# Patient Record
Sex: MALE | Race: OTHER | Employment: UNEMPLOYED | URBAN - METROPOLITAN AREA
[De-identification: names, ages, dates, MRNs, and addresses within clinical notes are randomized per-mention and may not be internally consistent; named-entity substitution may affect disease eponyms.]

---

## 2019-03-21 ENCOUNTER — APPOINTMENT (OUTPATIENT)
Dept: CT IMAGING | Age: 49
End: 2019-03-21

## 2019-03-21 ENCOUNTER — HOSPITAL ENCOUNTER (EMERGENCY)
Age: 49
Discharge: HOME | End: 2019-03-21
Attending: EMERGENCY MEDICINE

## 2019-03-21 VITALS
HEIGHT: 74 IN | BODY MASS INDEX: 24.38 KG/M2 | HEART RATE: 83 BPM | OXYGEN SATURATION: 97 % | SYSTOLIC BLOOD PRESSURE: 130 MMHG | WEIGHT: 190 LBS | TEMPERATURE: 97 F | DIASTOLIC BLOOD PRESSURE: 83 MMHG | RESPIRATION RATE: 18 BRPM

## 2019-03-21 DIAGNOSIS — F19.920 DRUG INTOXICATION WITHOUT COMPLICATION (HCC): ICD-10-CM
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
## 2019-03-21 PROCEDURE — 70450 CT HEAD/BRAIN W/O DYE: CPT

## 2019-03-21 PROCEDURE — G0384 LEV 5 HOSP TYPE B ED VISIT: HCPCS

## 2019-03-21 ASSESSMENT — ENCOUNTER SYMPTOMS
DIARRHEA: 0
SHORTNESS OF BREATH: 0
COLOR CHANGE: 0
COUGH: 0
BACK PAIN: 0
ABDOMINAL PAIN: 0
NAUSEA: 0
VOMITING: 0
SORE THROAT: 0

## 2021-10-07 ENCOUNTER — HOSPITAL ENCOUNTER (OUTPATIENT)
Age: 51
Discharge: HOME | End: 2021-10-07
Payer: COMMERCIAL

## 2021-10-07 LAB
ABSOLUTE EOS #: 0.14 K/UL (ref 0–0.44)
ABSOLUTE IMMATURE GRANULOCYTE: <0.03 K/UL (ref 0–0.3)
ABSOLUTE LYMPH #: 2.54 K/UL (ref 1.1–3.7)
ABSOLUTE MONO #: 0.58 K/UL (ref 0.1–1.2)
ALBUMIN SERPL-MCNC: 4.3 G/DL (ref 3.5–5.2)
ALBUMIN/GLOBULIN RATIO: 1.4 (ref 1–2.5)
ALP BLD-CCNC: 64 U/L (ref 40–129)
ALT SERPL-CCNC: 59 U/L (ref 5–41)
ANION GAP SERPL CALCULATED.3IONS-SCNC: 10 MMOL/L (ref 9–17)
AST SERPL-CCNC: 48 U/L
BASOPHILS # BLD: 0 % (ref 0–2)
BASOPHILS ABSOLUTE: <0.03 K/UL (ref 0–0.2)
BILIRUB SERPL-MCNC: 0.79 MG/DL (ref 0.3–1.2)
BUN BLDV-MCNC: 17 MG/DL (ref 6–20)
BUN/CREAT BLD: ABNORMAL (ref 9–20)
CALCIUM SERPL-MCNC: 9.3 MG/DL (ref 8.6–10.4)
CHLORIDE BLD-SCNC: 103 MMOL/L (ref 98–107)
CO2: 25 MMOL/L (ref 20–31)
CREAT SERPL-MCNC: 0.93 MG/DL (ref 0.7–1.2)
DIFFERENTIAL TYPE: ABNORMAL
EKG ATRIAL RATE: 78 BPM
EKG P AXIS: 27 DEGREES
EKG P-R INTERVAL: 140 MS
EKG Q-T INTERVAL: 378 MS
EKG QRS DURATION: 108 MS
EKG QTC CALCULATION (BAZETT): 430 MS
EKG R AXIS: 23 DEGREES
EKG T AXIS: 16 DEGREES
EKG VENTRICULAR RATE: 78 BPM
EOSINOPHILS RELATIVE PERCENT: 3 % (ref 1–4)
GFR AFRICAN AMERICAN: >60 ML/MIN
GFR NON-AFRICAN AMERICAN: >60 ML/MIN
GFR SERPL CREATININE-BSD FRML MDRD: ABNORMAL ML/MIN/{1.73_M2}
GFR SERPL CREATININE-BSD FRML MDRD: ABNORMAL ML/MIN/{1.73_M2}
GLUCOSE BLD-MCNC: 104 MG/DL (ref 70–99)
HAV IGM SER IA-ACNC: NONREACTIVE
HCT VFR BLD CALC: 48 % (ref 40.7–50.3)
HEMOGLOBIN: 15.7 G/DL (ref 13–17)
HEPATITIS B CORE IGM ANTIBODY: NONREACTIVE
HEPATITIS B SURFACE ANTIGEN: NONREACTIVE
HEPATITIS C ANTIBODY: REACTIVE
HIV AG/AB: NONREACTIVE
IMMATURE GRANULOCYTES: 0 %
LYMPHOCYTES # BLD: 47 % (ref 24–43)
MCH RBC QN AUTO: 32.2 PG (ref 25.2–33.5)
MCHC RBC AUTO-ENTMCNC: 32.7 G/DL (ref 28.4–34.8)
MCV RBC AUTO: 98.4 FL (ref 82.6–102.9)
MONOCYTES # BLD: 11 % (ref 3–12)
NRBC AUTOMATED: 0 PER 100 WBC
PDW BLD-RTO: 13 % (ref 11.8–14.4)
PLATELET # BLD: 258 K/UL (ref 138–453)
PLATELET ESTIMATE: ABNORMAL
PMV BLD AUTO: 10 FL (ref 8.1–13.5)
POTASSIUM SERPL-SCNC: 4 MMOL/L (ref 3.7–5.3)
RBC # BLD: 4.88 M/UL (ref 4.21–5.77)
RBC # BLD: ABNORMAL 10*6/UL
SEG NEUTROPHILS: 39 % (ref 36–65)
SEGMENTED NEUTROPHILS ABSOLUTE COUNT: 2.14 K/UL (ref 1.5–8.1)
SODIUM BLD-SCNC: 138 MMOL/L (ref 135–144)
TOTAL PROTEIN: 7.4 G/DL (ref 6.4–8.3)
WBC # BLD: 5.4 K/UL (ref 3.5–11.3)
WBC # BLD: ABNORMAL 10*3/UL

## 2021-10-07 PROCEDURE — 93010 ELECTROCARDIOGRAM REPORT: CPT | Performed by: INTERNAL MEDICINE

## 2021-10-07 PROCEDURE — 93005 ELECTROCARDIOGRAM TRACING: CPT | Performed by: FAMILY MEDICINE

## 2021-10-07 PROCEDURE — 80074 ACUTE HEPATITIS PANEL: CPT

## 2021-10-07 PROCEDURE — 36415 COLL VENOUS BLD VENIPUNCTURE: CPT

## 2021-10-07 PROCEDURE — 80053 COMPREHEN METABOLIC PANEL: CPT

## 2021-10-07 PROCEDURE — 87389 HIV-1 AG W/HIV-1&-2 AB AG IA: CPT

## 2021-10-07 PROCEDURE — 86480 TB TEST CELL IMMUN MEASURE: CPT

## 2021-10-07 PROCEDURE — 85025 COMPLETE CBC W/AUTO DIFF WBC: CPT

## 2021-10-09 LAB
QUANTI TB GOLD PLUS: NEGATIVE
QUANTI TB1 MINUS NIL: 0.01 IU/ML (ref 0–0.34)
QUANTI TB2 MINUS NIL: 0.01 IU/ML (ref 0–0.34)
QUANTIFERON MITOGEN: >10 IU/ML
QUANTIFERON NIL: 0.01 IU/ML

## 2023-03-07 ENCOUNTER — TELEPHONE (OUTPATIENT)
Dept: FAMILY MEDICINE CLINIC | Age: 53
End: 2023-03-07

## 2023-03-07 NOTE — TELEPHONE ENCOUNTER
----- Message from Larry Arechiga sent at 3/7/2023  3:48 PM EST -----  Subject: Message to Provider    QUESTIONS  Information for Provider? pt would like to know if he can be seen by PCP   Roma Perales. Please advise if he can establish care again.   ---------------------------------------------------------------------------  --------------  [address]  [phone]; OK to leave message on voicemail  ---------------------------------------------------------------------------  --------------  SCRIPT ANSWERS  Relationship to Patient?  Self

## 2023-03-30 ENCOUNTER — OFFICE VISIT (OUTPATIENT)
Dept: INTERNAL MEDICINE CLINIC | Age: 53
End: 2023-03-30

## 2023-03-30 VITALS
DIASTOLIC BLOOD PRESSURE: 86 MMHG | WEIGHT: 215 LBS | HEART RATE: 80 BPM | SYSTOLIC BLOOD PRESSURE: 130 MMHG | BODY MASS INDEX: 27.59 KG/M2 | HEIGHT: 74 IN | OXYGEN SATURATION: 95 %

## 2023-03-30 DIAGNOSIS — Z13.220 SCREENING FOR HYPERLIPIDEMIA: ICD-10-CM

## 2023-03-30 DIAGNOSIS — Z00.00 WELL ADULT EXAM: ICD-10-CM

## 2023-03-30 DIAGNOSIS — B19.20 HEPATITIS C VIRUS INFECTION WITHOUT HEPATIC COMA, UNSPECIFIED CHRONICITY: ICD-10-CM

## 2023-03-30 DIAGNOSIS — F10.20 ALCOHOLISM (HCC): Primary | ICD-10-CM

## 2023-03-30 DIAGNOSIS — Z12.11 COLON CANCER SCREENING: ICD-10-CM

## 2023-03-30 SDOH — ECONOMIC STABILITY: FOOD INSECURITY: WITHIN THE PAST 12 MONTHS, THE FOOD YOU BOUGHT JUST DIDN'T LAST AND YOU DIDN'T HAVE MONEY TO GET MORE.: NEVER TRUE

## 2023-03-30 SDOH — ECONOMIC STABILITY: HOUSING INSECURITY
IN THE LAST 12 MONTHS, WAS THERE A TIME WHEN YOU DID NOT HAVE A STEADY PLACE TO SLEEP OR SLEPT IN A SHELTER (INCLUDING NOW)?: NO

## 2023-03-30 SDOH — ECONOMIC STABILITY: FOOD INSECURITY: WITHIN THE PAST 12 MONTHS, YOU WORRIED THAT YOUR FOOD WOULD RUN OUT BEFORE YOU GOT MONEY TO BUY MORE.: NEVER TRUE

## 2023-03-30 SDOH — ECONOMIC STABILITY: INCOME INSECURITY: HOW HARD IS IT FOR YOU TO PAY FOR THE VERY BASICS LIKE FOOD, HOUSING, MEDICAL CARE, AND HEATING?: NOT HARD AT ALL

## 2023-03-30 ASSESSMENT — PATIENT HEALTH QUESTIONNAIRE - PHQ9
2. FEELING DOWN, DEPRESSED OR HOPELESS: 0
SUM OF ALL RESPONSES TO PHQ QUESTIONS 1-9: 0
SUM OF ALL RESPONSES TO PHQ QUESTIONS 1-9: 0
1. LITTLE INTEREST OR PLEASURE IN DOING THINGS: 0
SUM OF ALL RESPONSES TO PHQ QUESTIONS 1-9: 0
SUM OF ALL RESPONSES TO PHQ QUESTIONS 1-9: 0
SUM OF ALL RESPONSES TO PHQ9 QUESTIONS 1 & 2: 0

## 2023-03-30 ASSESSMENT — ENCOUNTER SYMPTOMS: HEARTBURN: 1

## 2023-03-30 NOTE — PROGRESS NOTES
Visit Information    Have you changed or started any medications since your last visit including any over-the-counter medicines, vitamins, or herbal medicines? no   Are you having any side effects from any of your medications? -  no  Have you stopped taking any of your medications? Is so, why? -  no    Have you seen any other physician or provider since your last visit? Yes - Records Obtained  Have you had any other diagnostic tests since your last visit? Yes - Records Obtained  Have you been seen in the emergency room and/or had an admission to a hospital since we last saw you? Yes - Records Obtained  Have you had your routine dental cleaning in the past 6 months? no    Have you activated your [facility] account? If not, what are your barriers?  No:      Patient Care Team:  Monique Garrison MD as PCP - General (Internal Medicine)  Yana Higgins MD    Medical History Review  Past Medical, Family, and Social History reviewed and does contribute to the patient presenting condition    Health Maintenance   Topic Date Due    COVID-19 Vaccine (1) Never done    Pneumococcal 0-64 years Vaccine (1 - PCV) Never done    Depression Screen  Never done    DTaP/Tdap/Td vaccine (1 - Tdap) Never done    Lipids  Never done    Colorectal Cancer Screen  Never done    Shingles vaccine (1 of 2) Never done    Flu vaccine (1) Never done    Hepatitis C screen  Completed    HIV screen  Completed    Hepatitis A vaccine  Aged Out    Hib vaccine  Aged Out    Meningococcal (ACWY) vaccine  Aged Out
normal.      Pupils: Pupils are equal, round, and reactive to light. Neck:      Thyroid: No thyromegaly. Vascular: No JVD. Cardiovascular:      Rate and Rhythm: Normal rate and regular rhythm. Heart sounds: Normal heart sounds. No murmur heard. Pulmonary:      Effort: Pulmonary effort is normal.      Breath sounds: Normal breath sounds. Abdominal:      General: Bowel sounds are normal.      Palpations: Abdomen is soft. Musculoskeletal:         General: Normal range of motion. Cervical back: Normal range of motion and neck supple. Skin:     General: Skin is warm and dry. Neurological:      Mental Status: He is alert and oriented to person, place, and time. Deep Tendon Reflexes: Reflexes are normal and symmetric. /86 (Site: Right Upper Arm, Position: Sitting)   Pulse 80   Ht 6' 2\" (1.88 m)   Wt 215 lb (97.5 kg)   SpO2 95%   BMI 27.60 kg/m²       Assessment:       Diagnosis Orders   1. Alcoholism ([address]        2. Screening for hyperlipidemia        3. Hepatitis C virus infection without hepatic coma, unspecified chronicity        4. Colon cancer screening  FIT-DNA (Cologuard)      5. Well adult exam            Plan:      Return in about 3 months (around 6/30/2023). No orders of the defined types were placed in this encounter. Orders Placed This Encounter   Procedures    FIT-DNA (Cologuard)              Patient given educational materials - see patient instructions. Discussed use, benefit, and side effects of prescribed medications. All patient questions answered. Pt voiced understanding.     Electronically signed by Ceci Garcia MD on 3/30/2023at 3:28 PM

## 2023-04-24 ENCOUNTER — TELEPHONE (OUTPATIENT)
Dept: INTERNAL MEDICINE CLINIC | Age: 53
End: 2023-04-24

## 2023-04-24 RX ORDER — OMEPRAZOLE 40 MG/1
40 CAPSULE, DELAYED RELEASE ORAL DAILY
Qty: 30 CAPSULE | Refills: 2 | Status: SHIPPED | OUTPATIENT
Start: 2023-04-24

## 2023-04-24 NOTE — TELEPHONE ENCOUNTER
----- Message from Uziel Wahkiakum sent at 4/24/2023  9:53 AM EDT -----  Subject: Refill Request    QUESTIONS  Name of Medication? omeprazole (PRILOSEC) 40 MG delayed release capsule  Patient-reported dosage and instructions? 40 mg, once a day  How many days do you have left? 0  [id number]  Pharmacy phone number (if available)? [phone]  Additional Information for Provider? Please refill ASAP as the patient is   out of this medication.  ---------------------------------------------------------------------------  --------------  CALL BACK INFO  What is the best way for the office to contact you? OK to leave message on   voicemail  Preferred Call Back Phone Number? [phone]  ---------------------------------------------------------------------------  --------------  SCRIPT ANSWERS  Relationship to Patient?  Self

## 2023-04-24 NOTE — TELEPHONE ENCOUNTER
----- Message from Ashley Julien sent at 4/24/2023  9:55 AM EDT -----  Subject: Message to Provider    QUESTIONS  Information for Provider? Patient would like a note written for work   stating that he is medically able to use the gym at work. Please call   patient to discuss picking up this note from the office asap. Thank you.  ---------------------------------------------------------------------------  --------------  Abimbola Odonnell [facility]  [phone]; OK to leave message on voicemail  ---------------------------------------------------------------------------  --------------  SCRIPT ANSWERS  Relationship to Patient?  Self

## 2023-04-25 NOTE — TELEPHONE ENCOUNTER
Patient returned call, please call back before 12:00 pm stating that he will not be available after that time.

## 2023-07-10 ENCOUNTER — HOSPITAL ENCOUNTER (EMERGENCY)
Age: 53
Discharge: HOME | End: 2023-07-11
Attending: STUDENT IN AN ORGANIZED HEALTH CARE EDUCATION/TRAINING PROGRAM
Payer: COMMERCIAL

## 2023-07-10 VITALS
WEIGHT: 195 LBS | OXYGEN SATURATION: 98 % | SYSTOLIC BLOOD PRESSURE: 140 MMHG | TEMPERATURE: 98 F | HEART RATE: 103 BPM | BODY MASS INDEX: 25.84 KG/M2 | RESPIRATION RATE: 15 BRPM | HEIGHT: 73 IN | DIASTOLIC BLOOD PRESSURE: 93 MMHG

## 2023-07-10 DIAGNOSIS — T50.901A ACCIDENTAL OVERDOSE, INITIAL ENCOUNTER: Primary | ICD-10-CM

## 2023-07-10 PROCEDURE — 99283 EMERGENCY DEPT VISIT LOW MDM: CPT

## 2023-07-10 PROCEDURE — 93005 ELECTROCARDIOGRAM TRACING: CPT | Performed by: EMERGENCY MEDICINE

## 2023-07-10 ASSESSMENT — PAIN - FUNCTIONAL ASSESSMENT: PAIN_FUNCTIONAL_ASSESSMENT: NONE - DENIES PAIN

## 2023-07-11 LAB
EKG ATRIAL RATE: 105 BPM
EKG P AXIS: 40 DEGREES
EKG P-R INTERVAL: 146 MS
EKG Q-T INTERVAL: 366 MS
EKG QRS DURATION: 112 MS
EKG QTC CALCULATION (BAZETT): 483 MS
EKG R AXIS: -45 DEGREES
EKG T AXIS: 46 DEGREES
EKG VENTRICULAR RATE: 105 BPM

## 2023-07-11 PROCEDURE — 93010 ELECTROCARDIOGRAM REPORT: CPT | Performed by: INTERNAL MEDICINE

## 2023-07-11 ASSESSMENT — ENCOUNTER SYMPTOMS
ABDOMINAL PAIN: 0
SHORTNESS OF BREATH: 0

## 2023-07-11 NOTE — ED TRIAGE NOTES
Mode of arrival (squad #, walk in, police, etc) : [place]/ EMS        Chief complaint(s): Unintentional Overdose        Arrival Note (brief scenario, treatment PTA, etc). : Pt brought to ER by ems for unintentional overdose. Pt was given 2mg IV narcan and became conscious. Pt alert and oriented X 4 now. Resp easy. PWD  Pt states he did meth and cocaine today with no intention to harm himself. C= \"Have you ever felt that you should Cut down on your drinking? \"  no  A= \"Have people Annoyed you by criticizing your drinking? \"  no  G= \"Have you ever felt bad or Guilty about your drinking? \"  no  E= \"Have you ever had a drink as an Eye-opener first thing in the morning to steady your nerves or to help a hangover? \"  no      Deferred []      Reason for deferring:     *If yes to two or more: probable alcohol abuse. *

## 2023-07-11 NOTE — DISCHARGE INSTRUCTIONS
You were seen today after an overdose. He received Narcan with improvement in your symptoms. You had no complaints while you are here and state you felt fine. We did an EKG that was normal.  He were requesting discharge after he observed you for 2 hours. If you notice any concerning symptoms please return to the ER immediately. These can include but are not limited to: fevers, chills, shortness of breath, vomiting, weakness of the extremities, changes in your mental status, numbness, pale extremities, or chest pain. Medications: Continue taking your home medications as previously directed. Follow up: Please follow up with your primary care doctor within one week or as needed.

## 2023-07-11 NOTE — ED PROVIDER NOTES
[facility], [place], operated by [facility] ED  Emergency Department Encounter  Emergency Medicine Resident     Pt Jo Marsh  MRN: [id number]  [address] 1970  Date of evaluation: 7/10/23  PCP:  Ramu Reyna MD  Note Started: 10:11 PM EDT      CHIEF COMPLAINT       Chief Complaint   Patient presents with    Drug Overdose       HISTORY OF PRESENT ILLNESS  (Location/Symptom, Timing/Onset, Context/Setting, Quality, Duration, Modifying Factors, Severity.)      Alanis Thurston is a 46 y.o. male who presents with overdose of unknown substance. Patient denied taking anything to EMS. He tells me he took 20 mg of Percocet and a couple shots of tequila. He received 2 mg of Narcan intranasally and 4 mg of Narcan IV prior to arrival.  Patient is awake and alert and has no complaints. PAST MEDICAL / SURGICAL / SOCIAL / FAMILY HISTORY      has a past medical history of GERD (gastroesophageal reflux disease) and Overdose.       has a past surgical history that includes Wrist fracture surgery. Social History     Socioeconomic History    Marital status: Single     Spouse name: Not on file    Number of children: Not on file    Years of education: Not on file    Highest education level: Not on file   Occupational History    Not on file   Tobacco Use    Smoking status: Light Smoker    Smokeless tobacco: Not on file   Vaping Use    Vaping Use: Never used   Substance and Sexual Activity    Alcohol use:  Yes     Alcohol/week: 5.0 standard drinks     Types: 5 Cans of beer per week     Comment: 2-5 beers daily    Drug use: Yes     Types: Cocaine, Methamphetamines (Crystal Meth)    Sexual activity: Not on file   Other Topics Concern    Not on file   Social History Narrative    Not on file     Social Determinants of Health     Financial Resource Strain: Low Risk     Difficulty of Paying Living Expenses: Not hard at all   Food Insecurity: No Food Insecurity    Worried About Running Out of Food in the Last Year: Never true    Ran Out of Food in

## 2024-09-08 ENCOUNTER — HOSPITAL ENCOUNTER (EMERGENCY)
Age: 54
Discharge: HOME | End: 2024-09-08
Attending: EMERGENCY MEDICINE
Payer: COMMERCIAL

## 2024-09-08 ENCOUNTER — APPOINTMENT (OUTPATIENT)
Dept: CT IMAGING | Age: 54
End: 2024-09-08
Payer: COMMERCIAL

## 2024-09-08 ENCOUNTER — APPOINTMENT (OUTPATIENT)
Dept: GENERAL RADIOLOGY | Age: 54
End: 2024-09-08
Attending: EMERGENCY MEDICINE
Payer: COMMERCIAL

## 2024-09-08 VITALS
RESPIRATION RATE: 16 BRPM | TEMPERATURE: 98.4 F | SYSTOLIC BLOOD PRESSURE: 142 MMHG | BODY MASS INDEX: 25.73 KG/M2 | DIASTOLIC BLOOD PRESSURE: 86 MMHG | WEIGHT: 195 LBS | HEART RATE: 74 BPM | OXYGEN SATURATION: 100 %

## 2024-09-08 DIAGNOSIS — S09.90XA CLOSED HEAD INJURY, INITIAL ENCOUNTER: ICD-10-CM
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
## 2024-09-08 DIAGNOSIS — S43.004A DISLOCATION OF RIGHT SHOULDER JOINT, INITIAL ENCOUNTER: Primary | ICD-10-CM

## 2024-09-08 DIAGNOSIS — S40.011A CONTUSION OF RIGHT SHOULDER, INITIAL ENCOUNTER: ICD-10-CM

## 2024-09-08 LAB
ALBUMIN SERPL-MCNC: 3.8 G/DL (ref 3.5–5.2)
ALP SERPL-CCNC: 75 U/L (ref 40–129)
ALT SERPL-CCNC: 53 U/L (ref 5–41)
ANION GAP SERPL CALCULATED.3IONS-SCNC: 10 MMOL/L (ref 9–17)
AST SERPL-CCNC: 56 U/L
BASOPHILS # BLD: 0 K/UL (ref 0–0.2)
BASOPHILS NFR BLD: 0 % (ref 0–2)
BILIRUB SERPL-MCNC: 1.2 MG/DL (ref 0.3–1.2)
BUN SERPL-MCNC: 12 MG/DL (ref 6–20)
CALCIUM SERPL-MCNC: 9.1 MG/DL (ref 8.6–10.4)
CHLORIDE SERPL-SCNC: 101 MMOL/L (ref 98–107)
CO2 SERPL-SCNC: 26 MMOL/L (ref 20–31)
CREAT SERPL-MCNC: 0.8 MG/DL (ref 0.7–1.2)
EOSINOPHIL # BLD: 0 K/UL (ref 0–0.4)
EOSINOPHILS RELATIVE PERCENT: 0 % (ref 0–4)
ERYTHROCYTE [DISTWIDTH] IN BLOOD BY AUTOMATED COUNT: 13.8 % (ref 11.5–14.9)
GFR, ESTIMATED: >90 ML/MIN/1.73M2
GLUCOSE SERPL-MCNC: 134 MG/DL (ref 70–99)
HCT VFR BLD AUTO: 45 % (ref 41–53)
HGB BLD-MCNC: 15.3 G/DL (ref 13.5–17.5)
INR PPP: 0.9
LYMPHOCYTES NFR BLD: 1.8 K/UL (ref 1–4.8)
LYMPHOCYTES RELATIVE PERCENT: 17 % (ref 24–44)
MCH RBC QN AUTO: 33.2 PG (ref 26–34)
MCHC RBC AUTO-ENTMCNC: 34 G/DL (ref 31–37)
MCV RBC AUTO: 97.6 FL (ref 80–100)
MONOCYTES NFR BLD: 1 K/UL (ref 0.1–1.3)
MONOCYTES NFR BLD: 9 % (ref 1–7)
NEUTROPHILS NFR BLD: 74 % (ref 36–66)
NEUTS SEG NFR BLD: 7.8 K/UL (ref 1.3–9.1)
PARTIAL THROMBOPLASTIN TIME: 25 SEC (ref 24–36)
PLATELET # BLD AUTO: 216 K/UL (ref 150–450)
PMV BLD AUTO: 8 FL (ref 6–12)
POTASSIUM SERPL-SCNC: 4.5 MMOL/L (ref 3.7–5.3)
PROT SERPL-MCNC: 6.9 G/DL (ref 6.4–8.3)
PROTHROMBIN TIME: 12.8 SEC (ref 11.8–14.6)
RBC # BLD AUTO: 4.6 M/UL (ref 4.5–5.9)
SODIUM SERPL-SCNC: 137 MMOL/L (ref 135–144)
WBC OTHER # BLD: 10.6 K/UL (ref 3.5–11)

## 2024-09-08 PROCEDURE — 6360000002 HC RX W HCPCS: Performed by: EMERGENCY MEDICINE

## 2024-09-08 PROCEDURE — 36415 COLL VENOUS BLD VENIPUNCTURE: CPT

## 2024-09-08 PROCEDURE — 73030 X-RAY EXAM OF SHOULDER: CPT

## 2024-09-08 PROCEDURE — 85025 COMPLETE CBC W/AUTO DIFF WBC: CPT

## 2024-09-08 PROCEDURE — 96374 THER/PROPH/DIAG INJ IV PUSH: CPT

## 2024-09-08 PROCEDURE — 23650 CLTX SHO DSLC W/MNPJ WO ANES: CPT

## 2024-09-08 PROCEDURE — 73060 X-RAY EXAM OF HUMERUS: CPT

## 2024-09-08 PROCEDURE — 80053 COMPREHEN METABOLIC PANEL: CPT

## 2024-09-08 PROCEDURE — 71046 X-RAY EXAM CHEST 2 VIEWS: CPT

## 2024-09-08 PROCEDURE — 85610 PROTHROMBIN TIME: CPT

## 2024-09-08 PROCEDURE — 99285 EMERGENCY DEPT VISIT HI MDM: CPT

## 2024-09-08 PROCEDURE — 99152 MOD SED SAME PHYS/QHP 5/>YRS: CPT

## 2024-09-08 PROCEDURE — 85730 THROMBOPLASTIN TIME PARTIAL: CPT

## 2024-09-08 PROCEDURE — 72125 CT NECK SPINE W/O DYE: CPT

## 2024-09-08 PROCEDURE — 2500000003 HC RX 250 WO HCPCS: Performed by: EMERGENCY MEDICINE

## 2024-09-08 PROCEDURE — 99153 MOD SED SAME PHYS/QHP EA: CPT

## 2024-09-08 PROCEDURE — 70450 CT HEAD/BRAIN W/O DYE: CPT

## 2024-09-08 RX ORDER — KETAMINE HYDROCHLORIDE 50 MG/ML
INJECTION, SOLUTION INTRAMUSCULAR; INTRAVENOUS
Status: DISCONTINUED
Start: 2024-09-08 | End: 2024-09-08 | Stop reason: HOSPADM

## 2024-09-08 RX ORDER — IBUPROFEN 800 MG/1
800 TABLET, FILM COATED ORAL EVERY 8 HOURS PRN
Qty: 20 TABLET | Refills: 0 | Status: SHIPPED | OUTPATIENT
Start: 2024-09-08

## 2024-09-08 RX ORDER — METHOCARBAMOL 750 MG/1
750 TABLET, FILM COATED ORAL 3 TIMES DAILY PRN
Qty: 15 TABLET | Refills: 0 | Status: SHIPPED | OUTPATIENT
Start: 2024-09-08 | End: 2024-09-18

## 2024-09-08 RX ORDER — KETAMINE HYDROCHLORIDE 50 MG/ML
INJECTION, SOLUTION INTRAMUSCULAR; INTRAVENOUS DAILY PRN
Status: COMPLETED | OUTPATIENT
Start: 2024-09-08 | End: 2024-09-08

## 2024-09-08 RX ORDER — FENTANYL CITRATE 0.05 MG/ML
25 INJECTION, SOLUTION INTRAMUSCULAR; INTRAVENOUS ONCE
Status: COMPLETED | OUTPATIENT
Start: 2024-09-08 | End: 2024-09-08

## 2024-09-08 RX ORDER — HYDROCODONE BITARTRATE AND ACETAMINOPHEN 5; 325 MG/1; MG/1
1 TABLET ORAL EVERY 8 HOURS PRN
Qty: 10 TABLET | Refills: 0 | Status: SHIPPED | OUTPATIENT
Start: 2024-09-08 | End: 2024-09-11

## 2024-09-08 RX ADMIN — PROPOFOL 40 MG: 10 INJECTION, EMULSION INTRAVENOUS at 15:31

## 2024-09-08 RX ADMIN — PROPOFOL 40 MG: 10 INJECTION, EMULSION INTRAVENOUS at 15:29

## 2024-09-08 RX ADMIN — KETAMINE HYDROCHLORIDE 100 MG: 50 INJECTION, SOLUTION INTRAMUSCULAR; INTRAVENOUS at 15:40

## 2024-09-08 RX ADMIN — PROPOFOL 30 MG: 10 INJECTION, EMULSION INTRAVENOUS at 15:34

## 2024-09-08 RX ADMIN — PROPOFOL 90 MG: 10 INJECTION, EMULSION INTRAVENOUS at 15:27

## 2024-09-08 RX ADMIN — FENTANYL CITRATE 25 MCG: 0.05 INJECTION, SOLUTION INTRAMUSCULAR; INTRAVENOUS at 13:59

## 2024-09-08 ASSESSMENT — PAIN DESCRIPTION - ORIENTATION
ORIENTATION: RIGHT

## 2024-09-08 ASSESSMENT — PAIN SCALES - GENERAL
PAINLEVEL_OUTOF10: 2
PAINLEVEL_OUTOF10: 8
PAINLEVEL_OUTOF10: 6

## 2024-09-08 ASSESSMENT — PAIN DESCRIPTION - LOCATION
LOCATION: SHOULDER

## 2024-09-08 ASSESSMENT — PAIN - FUNCTIONAL ASSESSMENT: PAIN_FUNCTIONAL_ASSESSMENT: PREVENTS OR INTERFERES WITH ALL ACTIVE AND SOME PASSIVE ACTIVITIES

## 2024-09-08 ASSESSMENT — LIFESTYLE VARIABLES
HOW OFTEN DO YOU HAVE A DRINK CONTAINING ALCOHOL: NEVER
HOW MANY STANDARD DRINKS CONTAINING ALCOHOL DO YOU HAVE ON A TYPICAL DAY: PATIENT DOES NOT DRINK

## 2024-09-08 ASSESSMENT — PAIN DESCRIPTION - DESCRIPTORS
DESCRIPTORS: SHOOTING;SHARP
DESCRIPTORS: SHARP;SHOOTING
DESCRIPTORS: ACHING

## 2024-09-08 ASSESSMENT — PAIN DESCRIPTION - PAIN TYPE
TYPE: ACUTE PAIN
TYPE: ACUTE PAIN

## 2024-09-10 PROBLEM — M25.571 ARTHRALGIA OF RIGHT ANKLE: Status: ACTIVE | Noted: 2023-02-27

## 2024-09-10 PROBLEM — K27.9 PEPTIC ULCER: Status: ACTIVE | Noted: 2021-01-29

## 2024-09-10 PROBLEM — K52.9 NONINFECTIVE GASTROENTERITIS AND COLITIS, UNSPECIFIED: Status: ACTIVE | Noted: 2017-11-21

## 2024-09-10 PROBLEM — B19.20 HEPATITIS C VIRUS INFECTION: Status: ACTIVE | Noted: 2021-01-29

## 2024-09-10 PROBLEM — I78.1 ASYMPTOMATIC SPIDER VEIN: Status: ACTIVE | Noted: 2023-02-27

## 2024-09-10 PROBLEM — F17.200 NICOTINE DEPENDENCE: Status: ACTIVE | Noted: 2021-01-29

## 2024-09-10 PROBLEM — K21.9 GASTROESOPHAGEAL REFLUX DISEASE WITHOUT ESOPHAGITIS: Status: ACTIVE | Noted: 2021-09-28

## 2024-09-16 ENCOUNTER — OFFICE VISIT (OUTPATIENT)
Dept: ORTHOPEDIC SURGERY | Age: 54
End: 2024-09-16

## 2024-09-16 VITALS — WEIGHT: 206 LBS | HEIGHT: 74 IN | BODY MASS INDEX: 26.44 KG/M2

## 2024-09-16 DIAGNOSIS — M25.511 ACUTE PAIN OF RIGHT SHOULDER: ICD-10-CM

## 2024-09-16 DIAGNOSIS — S43.014A ANTERIOR DISLOCATION OF RIGHT SHOULDER, INITIAL ENCOUNTER: Primary | ICD-10-CM

## 2024-09-16 ASSESSMENT — ENCOUNTER SYMPTOMS
COUGH: 0
SHORTNESS OF BREATH: 0
COLOR CHANGE: 0
VOMITING: 0

## 2024-09-24 ENCOUNTER — APPOINTMENT (OUTPATIENT)
Dept: GENERAL RADIOLOGY | Age: 54
DRG: 912 | End: 2024-09-24
Payer: COMMERCIAL

## 2024-09-24 ENCOUNTER — APPOINTMENT (OUTPATIENT)
Dept: CT IMAGING | Age: 54
DRG: 912 | End: 2024-09-24
Payer: COMMERCIAL

## 2024-09-24 ENCOUNTER — APPOINTMENT (OUTPATIENT)
Dept: MRI IMAGING | Age: 54
DRG: 912 | End: 2024-09-24
Payer: COMMERCIAL

## 2024-09-24 ENCOUNTER — HOSPITAL ENCOUNTER (INPATIENT)
Age: 54
LOS: 8 days | Discharge: NURSING, REHAB OR LONG-TERM CARE | DRG: 912 | End: 2024-10-02
Attending: EMERGENCY MEDICINE | Admitting: SURGERY
Payer: COMMERCIAL

## 2024-09-24 DIAGNOSIS — R29.898 WEAKNESS OF BOTH UPPER EXTREMITIES: ICD-10-CM

## 2024-09-24 DIAGNOSIS — S14.105A SPINAL CORD INJURY AT C5-C7 LEVEL WITHOUT INJURY OF SPINAL BONE, INITIAL ENCOUNTER (HCC): ICD-10-CM

## 2024-09-24 DIAGNOSIS — R29.898 WEAKNESS OF LEFT LOWER EXTREMITY: ICD-10-CM
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
## 2024-09-24 DIAGNOSIS — S27.0XXA TRAUMATIC PNEUMOTHORAX, INITIAL ENCOUNTER: ICD-10-CM

## 2024-09-24 PROBLEM — S14.129D: Status: ACTIVE | Noted: 2024-09-24

## 2024-09-24 LAB
ABO + RH BLD: NORMAL
AMPHET UR QL SCN: NEGATIVE
ANION GAP SERPL CALCULATED.3IONS-SCNC: 10 MMOL/L (ref 9–16)
ARM BAND NUMBER: NORMAL
BARBITURATES UR QL SCN: NEGATIVE
BENZODIAZ UR QL: NEGATIVE
BILIRUB UR QL STRIP: NEGATIVE
BLOOD BANK SAMPLE EXPIRATION: NORMAL
BLOOD BANK SPECIMEN: ABNORMAL
BLOOD GROUP ANTIBODIES SERPL: NEGATIVE
BODY TEMPERATURE: 37
BUN SERPL-MCNC: 12 MG/DL (ref 6–20)
CANNABINOIDS UR QL SCN: POSITIVE
CHLORIDE SERPL-SCNC: 106 MMOL/L (ref 98–107)
CLARITY UR: CLEAR
CO2 SERPL-SCNC: 23 MMOL/L (ref 20–31)
COCAINE UR QL SCN: POSITIVE
COHGB MFR BLD: 4.1 % (ref 0–5)
COLOR UR: YELLOW
COMMENT: ABNORMAL
CREAT SERPL-MCNC: 0.8 MG/DL (ref 0.7–1.2)
ERYTHROCYTE [DISTWIDTH] IN BLOOD BY AUTOMATED COUNT: 13.4 % (ref 11.8–14.4)
ETHANOL PERCENT: <0.01 %
ETHANOLAMINE SERPL-MCNC: <10 MG/DL (ref 0–0.08)
FENTANYL UR QL: POSITIVE
FIO2 ON VENT: ABNORMAL %
GFR, ESTIMATED: 60 ML/MIN/1.73M2
GLUCOSE SERPL-MCNC: 104 MG/DL (ref 74–99)
GLUCOSE UR STRIP-MCNC: NEGATIVE MG/DL
HCO3 VENOUS: 25 MMOL/L (ref 24–30)
HCT VFR BLD AUTO: 41.7 % (ref 40.7–50.3)
HGB BLD-MCNC: 13.9 G/DL (ref 13–17)
HGB UR QL STRIP.AUTO: NEGATIVE
INR PPP: 0.9
KETONES UR STRIP-MCNC: ABNORMAL MG/DL
LEUKOCYTE ESTERASE UR QL STRIP: NEGATIVE
MCH RBC QN AUTO: 32.1 PG (ref 25.2–33.5)
MCHC RBC AUTO-ENTMCNC: 33.3 G/DL (ref 28.4–34.8)
MCV RBC AUTO: 96.3 FL (ref 82.6–102.9)
METHADONE UR QL: NEGATIVE
NITRITE UR QL STRIP: NEGATIVE
NRBC BLD-RTO: 0 PER 100 WBC
O2 SAT, VEN: 84.5 % (ref 60–85)
OPIATES UR QL SCN: NEGATIVE
OXYCODONE UR QL SCN: POSITIVE
PARTIAL THROMBOPLASTIN TIME: 22.6 SEC (ref 23–36.5)
PCO2 VENOUS: 36.1 MM HG (ref 39–55)
PCP UR QL SCN: NEGATIVE
PH UR STRIP: 6 [PH] (ref 5–8)
PH VENOUS: 7.45 (ref 7.32–7.42)
PLATELET # BLD AUTO: 247 K/UL (ref 138–453)
PMV BLD AUTO: 9.7 FL (ref 8.1–13.5)
PO2 VENOUS: 44 MM HG (ref 30–50)
POSITIVE BASE EXCESS, VEN: 1.8 MMOL/L (ref 0–2)
POTASSIUM SERPL-SCNC: 4.2 MMOL/L (ref 3.7–5.3)
PROT UR STRIP-MCNC: NEGATIVE MG/DL
PROTHROMBIN TIME: 11.9 SEC (ref 11.7–14.9)
RBC # BLD AUTO: 4.33 M/UL (ref 4.21–5.77)
SODIUM SERPL-SCNC: 139 MMOL/L (ref 136–145)
SP GR UR STRIP: 1.07 (ref 1–1.03)
TEST INFORMATION: ABNORMAL
UROBILINOGEN UR STRIP-ACNC: NORMAL EU/DL (ref 0–1)
WBC OTHER # BLD: 5.3 K/UL (ref 3.5–11.3)

## 2024-09-24 PROCEDURE — 86901 BLOOD TYPING SEROLOGIC RH(D): CPT

## 2024-09-24 PROCEDURE — 2580000003 HC RX 258: Performed by: NURSE PRACTITIONER

## 2024-09-24 PROCEDURE — 6810039000 HC L1 TRAUMA ALERT

## 2024-09-24 PROCEDURE — 73110 X-RAY EXAM OF WRIST: CPT

## 2024-09-24 PROCEDURE — 51701 INSERT BLADDER CATHETER: CPT

## 2024-09-24 PROCEDURE — 82805 BLOOD GASES W/O2 SATURATION: CPT

## 2024-09-24 PROCEDURE — 71045 X-RAY EXAM CHEST 1 VIEW: CPT

## 2024-09-24 PROCEDURE — 85610 PROTHROMBIN TIME: CPT

## 2024-09-24 PROCEDURE — 70498 CT ANGIOGRAPHY NECK: CPT

## 2024-09-24 PROCEDURE — 93005 ELECTROCARDIOGRAM TRACING: CPT | Performed by: NURSE PRACTITIONER

## 2024-09-24 PROCEDURE — 6360000002 HC RX W HCPCS

## 2024-09-24 PROCEDURE — 84703 CHORIONIC GONADOTROPIN ASSAY: CPT

## 2024-09-24 PROCEDURE — 81003 URINALYSIS AUTO W/O SCOPE: CPT

## 2024-09-24 PROCEDURE — 6370000000 HC RX 637 (ALT 250 FOR IP): Performed by: NURSE PRACTITIONER

## 2024-09-24 PROCEDURE — 2500000003 HC RX 250 WO HCPCS

## 2024-09-24 PROCEDURE — 72141 MRI NECK SPINE W/O DYE: CPT

## 2024-09-24 PROCEDURE — 73090 X-RAY EXAM OF FOREARM: CPT

## 2024-09-24 PROCEDURE — 2700000000 HC OXYGEN THERAPY PER DAY

## 2024-09-24 PROCEDURE — 90715 TDAP VACCINE 7 YRS/> IM: CPT

## 2024-09-24 PROCEDURE — 6360000002 HC RX W HCPCS: Performed by: NURSE PRACTITIONER

## 2024-09-24 PROCEDURE — 71260 CT THORAX DX C+: CPT

## 2024-09-24 PROCEDURE — 90471 IMMUNIZATION ADMIN: CPT

## 2024-09-24 PROCEDURE — 94761 N-INVAS EAR/PLS OXIMETRY MLT: CPT

## 2024-09-24 PROCEDURE — 73610 X-RAY EXAM OF ANKLE: CPT

## 2024-09-24 PROCEDURE — 70486 CT MAXILLOFACIAL W/O DYE: CPT

## 2024-09-24 PROCEDURE — 2000000000 HC ICU R&B

## 2024-09-24 PROCEDURE — 80051 ELECTROLYTE PANEL: CPT

## 2024-09-24 PROCEDURE — 86900 BLOOD TYPING SEROLOGIC ABO: CPT

## 2024-09-24 PROCEDURE — 73630 X-RAY EXAM OF FOOT: CPT

## 2024-09-24 PROCEDURE — 51798 US URINE CAPACITY MEASURE: CPT

## 2024-09-24 PROCEDURE — 36415 COLL VENOUS BLD VENIPUNCTURE: CPT

## 2024-09-24 PROCEDURE — 72125 CT NECK SPINE W/O DYE: CPT

## 2024-09-24 PROCEDURE — 73030 X-RAY EXAM OF SHOULDER: CPT

## 2024-09-24 PROCEDURE — 70450 CT HEAD/BRAIN W/O DYE: CPT

## 2024-09-24 PROCEDURE — 85027 COMPLETE CBC AUTOMATED: CPT

## 2024-09-24 PROCEDURE — 85730 THROMBOPLASTIN TIME PARTIAL: CPT

## 2024-09-24 PROCEDURE — 84520 ASSAY OF UREA NITROGEN: CPT

## 2024-09-24 PROCEDURE — G0480 DRUG TEST DEF 1-7 CLASSES: HCPCS

## 2024-09-24 PROCEDURE — 80307 DRUG TEST PRSMV CHEM ANLYZR: CPT

## 2024-09-24 PROCEDURE — 99285 EMERGENCY DEPT VISIT HI MDM: CPT

## 2024-09-24 PROCEDURE — 99222 1ST HOSP IP/OBS MODERATE 55: CPT | Performed by: NEUROLOGICAL SURGERY

## 2024-09-24 PROCEDURE — 86850 RBC ANTIBODY SCREEN: CPT

## 2024-09-24 PROCEDURE — 6360000004 HC RX CONTRAST MEDICATION

## 2024-09-24 PROCEDURE — 82565 ASSAY OF CREATININE: CPT

## 2024-09-24 PROCEDURE — 76376 3D RENDER W/INTRP POSTPROCES: CPT

## 2024-09-24 PROCEDURE — 82947 ASSAY GLUCOSE BLOOD QUANT: CPT

## 2024-09-24 RX ORDER — FENTANYL CITRATE 50 UG/ML
25 INJECTION, SOLUTION INTRAMUSCULAR; INTRAVENOUS
Status: DISCONTINUED | OUTPATIENT
Start: 2024-09-24 | End: 2024-09-26

## 2024-09-24 RX ORDER — PANTOPRAZOLE SODIUM 40 MG/1
40 TABLET, DELAYED RELEASE ORAL DAILY
Status: DISCONTINUED | OUTPATIENT
Start: 2024-09-25 | End: 2024-09-25

## 2024-09-24 RX ORDER — METHOCARBAMOL 750 MG/1
750 TABLET, FILM COATED ORAL 4 TIMES DAILY
Status: DISCONTINUED | OUTPATIENT
Start: 2024-09-24 | End: 2024-09-30

## 2024-09-24 RX ORDER — OXYCODONE HYDROCHLORIDE 5 MG/1
5 TABLET ORAL EVERY 4 HOURS PRN
Status: DISCONTINUED | OUTPATIENT
Start: 2024-09-24 | End: 2024-09-25

## 2024-09-24 RX ORDER — ACETAMINOPHEN 500 MG
1000 TABLET ORAL EVERY 8 HOURS SCHEDULED
Status: DISCONTINUED | OUTPATIENT
Start: 2024-09-24 | End: 2024-10-02 | Stop reason: HOSPADM

## 2024-09-24 RX ORDER — SENNA AND DOCUSATE SODIUM 50; 8.6 MG/1; MG/1
1 TABLET, FILM COATED ORAL 2 TIMES DAILY
Status: DISCONTINUED | OUTPATIENT
Start: 2024-09-24 | End: 2024-09-25

## 2024-09-24 RX ORDER — FENTANYL CITRATE 50 UG/ML
INJECTION, SOLUTION INTRAMUSCULAR; INTRAVENOUS
Status: COMPLETED
Start: 2024-09-24 | End: 2024-09-24

## 2024-09-24 RX ORDER — SODIUM CHLORIDE, SODIUM LACTATE, POTASSIUM CHLORIDE, CALCIUM CHLORIDE 600; 310; 30; 20 MG/100ML; MG/100ML; MG/100ML; MG/100ML
INJECTION, SOLUTION INTRAVENOUS CONTINUOUS
Status: DISCONTINUED | OUTPATIENT
Start: 2024-09-24 | End: 2024-09-27

## 2024-09-24 RX ORDER — METHOCARBAMOL 500 MG/1
500 TABLET, FILM COATED ORAL
Status: COMPLETED | OUTPATIENT
Start: 2024-09-24 | End: 2024-09-24

## 2024-09-24 RX ORDER — GABAPENTIN 300 MG/1
300 CAPSULE ORAL 3 TIMES DAILY
Status: DISCONTINUED | OUTPATIENT
Start: 2024-09-24 | End: 2024-09-26

## 2024-09-24 RX ORDER — KETOROLAC TROMETHAMINE 15 MG/ML
15 INJECTION, SOLUTION INTRAMUSCULAR; INTRAVENOUS ONCE
Status: COMPLETED | OUTPATIENT
Start: 2024-09-24 | End: 2024-09-24

## 2024-09-24 RX ORDER — IOPAMIDOL 755 MG/ML
130 INJECTION, SOLUTION INTRAVASCULAR
Status: COMPLETED | OUTPATIENT
Start: 2024-09-24 | End: 2024-09-24

## 2024-09-24 RX ORDER — POLYETHYLENE GLYCOL 3350 17 G/17G
17 POWDER, FOR SOLUTION ORAL DAILY
Status: DISCONTINUED | OUTPATIENT
Start: 2024-09-24 | End: 2024-09-25

## 2024-09-24 RX ORDER — MAGNESIUM HYDROXIDE/ALUMINUM HYDROXICE/SIMETHICONE 120; 1200; 1200 MG/30ML; MG/30ML; MG/30ML
30 SUSPENSION ORAL EVERY 6 HOURS PRN
Status: DISCONTINUED | OUTPATIENT
Start: 2024-09-24 | End: 2024-10-02 | Stop reason: HOSPADM

## 2024-09-24 RX ORDER — OXYCODONE HYDROCHLORIDE 5 MG/1
10 TABLET ORAL ONCE
Status: COMPLETED | OUTPATIENT
Start: 2024-09-24 | End: 2024-09-24

## 2024-09-24 RX ADMIN — FENTANYL CITRATE 25 MCG: 50 INJECTION, SOLUTION INTRAMUSCULAR; INTRAVENOUS at 12:34

## 2024-09-24 RX ADMIN — FENTANYL CITRATE 25 MCG: 50 INJECTION, SOLUTION INTRAMUSCULAR; INTRAVENOUS at 11:42

## 2024-09-24 RX ADMIN — KETOROLAC TROMETHAMINE 15 MG: 15 INJECTION, SOLUTION INTRAMUSCULAR; INTRAVENOUS at 13:56

## 2024-09-24 RX ADMIN — TETANUS TOXOID, REDUCED DIPHTHERIA TOXOID AND ACELLULAR PERTUSSIS VACCINE, ADSORBED 1 ML: 5; 2.5; 8; 8; 2.5 SUSPENSION INTRAMUSCULAR at 11:13

## 2024-09-24 RX ADMIN — GABAPENTIN 300 MG: 300 CAPSULE ORAL at 12:21

## 2024-09-24 RX ADMIN — FENTANYL CITRATE 25 MCG: 50 INJECTION, SOLUTION INTRAMUSCULAR; INTRAVENOUS at 22:30

## 2024-09-24 RX ADMIN — SODIUM CHLORIDE, POTASSIUM CHLORIDE, SODIUM LACTATE AND CALCIUM CHLORIDE: 600; 310; 30; 20 INJECTION, SOLUTION INTRAVENOUS at 22:25

## 2024-09-24 RX ADMIN — OXYCODONE 10 MG: 5 TABLET ORAL at 13:53

## 2024-09-24 RX ADMIN — SODIUM CHLORIDE, POTASSIUM CHLORIDE, SODIUM LACTATE AND CALCIUM CHLORIDE: 600; 310; 30; 20 INJECTION, SOLUTION INTRAVENOUS at 12:00

## 2024-09-24 RX ADMIN — GABAPENTIN 300 MG: 300 CAPSULE ORAL at 20:20

## 2024-09-24 RX ADMIN — METHOCARBAMOL 500 MG: 500 TABLET ORAL at 12:20

## 2024-09-24 RX ADMIN — METHOCARBAMOL 750 MG: 750 TABLET ORAL at 20:20

## 2024-09-24 RX ADMIN — SENNOSIDES AND DOCUSATE SODIUM 1 TABLET: 50; 8.6 TABLET ORAL at 20:20

## 2024-09-24 RX ADMIN — Medication: at 11:13

## 2024-09-24 RX ADMIN — IOPAMIDOL 130 ML: 755 INJECTION, SOLUTION INTRAVENOUS at 11:05

## 2024-09-24 RX ADMIN — ACETAMINOPHEN 1000 MG: 500 TABLET ORAL at 20:20

## 2024-09-24 RX ADMIN — FENTANYL CITRATE 25 MCG: 50 INJECTION, SOLUTION INTRAMUSCULAR; INTRAVENOUS at 17:48

## 2024-09-24 RX ADMIN — ACETAMINOPHEN 1000 MG: 500 TABLET ORAL at 13:53

## 2024-09-24 RX ADMIN — OXYCODONE 5 MG: 5 TABLET ORAL at 12:21

## 2024-09-24 RX ADMIN — FENTANYL CITRATE 25 MCG: 50 INJECTION, SOLUTION INTRAMUSCULAR; INTRAVENOUS at 19:19

## 2024-09-24 RX ADMIN — FENTANYL CITRATE 25 MCG: 50 INJECTION, SOLUTION INTRAMUSCULAR; INTRAVENOUS at 14:40

## 2024-09-24 RX ADMIN — METHOCARBAMOL 750 MG: 750 TABLET ORAL at 13:53

## 2024-09-24 RX ADMIN — OXYCODONE 5 MG: 5 TABLET ORAL at 21:53

## 2024-09-24 ASSESSMENT — PAIN SCALES - GENERAL
PAINLEVEL_OUTOF10: 6
PAINLEVEL_OUTOF10: 8
PAINLEVEL_OUTOF10: 3
PAINLEVEL_OUTOF10: 7
PAINLEVEL_OUTOF10: 8
PAINLEVEL_OUTOF10: 6

## 2024-09-24 ASSESSMENT — PAIN SCALES - WONG BAKER: WONGBAKER_NUMERICALRESPONSE: NO HURT

## 2024-09-24 ASSESSMENT — PAIN DESCRIPTION - LOCATION
LOCATION: NECK
LOCATION: NECK

## 2024-09-24 ASSESSMENT — PAIN - FUNCTIONAL ASSESSMENT: PAIN_FUNCTIONAL_ASSESSMENT: 0-10

## 2024-09-24 NOTE — H&P
VIEWS)   Final Result   Left forearm:      1. Mild soft tissue edema of the left forearm   2. No acute osseous abnormality.   Left wrist:      1. Mild soft tissue swelling of the left wrist.   2. No acute fracture or dislocation.   Right forearm:      1. Mild soft tissue edema of the right forearm.   2. No acute fracture or dislocation.   Right wrist:      1. Mild soft tissue swelling the right wrist.   2. No acute fracture or dislocation.   Right shoulder:      1. Mild degenerative changes as detailed above.   2. No acute fracture or dislocation.   Left ankle:      No acute fracture or dislocation.      Left foot:      No acute fracture or dislocation.         XR ANKLE LEFT (MIN 3 VIEWS)   Final Result   Left forearm:      1. Mild soft tissue edema of the left forearm   2. No acute osseous abnormality.   Left wrist:      1. Mild soft tissue swelling of the left wrist.   2. No acute fracture or dislocation.   Right forearm:      1. Mild soft tissue edema of the right forearm.   2. No acute fracture or dislocation.   Right wrist:      1. Mild soft tissue swelling the right wrist.   2. No acute fracture or dislocation.   Right shoulder:      1. Mild degenerative changes as detailed above.   2. No acute fracture or dislocation.   Left ankle:      No acute fracture or dislocation.      Left foot:      No acute fracture or dislocation.         CT LUMBAR SPINE BONY RECONSTRUCTION   Final Result   No acute thoracic or lumbar spine trauma.         CT THORACIC SPINE BONY RECONSTRUCTION   Final Result   No acute thoracic or lumbar spine trauma.         CT CHEST ABDOMEN PELVIS W CONTRAST Additional Contrast? None   Final Result   1. 30% anterolateral left pneumothorax.   2. Nondisplaced left anterior rib fractures which appear to include the 2nd   through 5th ribs.   3. No acute intra-abdominal or intrapelvic abnormalities.   4. Mild bibasilar atelectasis/fibrosis.   Critical results were called by Dr. Pilo Bledsoe MD to

## 2024-09-24 NOTE — ED PROVIDER NOTES
[facility]     Emergency Department     Faculty Note/ Attestation      Pt Name: Dq Trauma Xxdesmoines                                       MRN: [id number]  Birthdate 1/1/1880  Date of evaluation: 9/24/2024    Patients PCP:    No primary care provider on file.    Note Started: 10:41 AM EDT      Attestation  I performed a history and physical examination of the patient and discussed management with the resident. I reviewed the resident’s note and agree with the documented findings and plan of care. Any areas of disagreement are noted on the chart. I was personally present for the key portions of any procedures. I have documented in the chart those procedures where I was not present during the key portions. I have reviewed the emergency nurses triage note. I agree with the chief complaint, past medical history, past surgical history, allergies, medications, social and family history as documented unless otherwise noted below.    For Physician Assistant/ Nurse Practitioner cases/documentation I have personally evaluated this patient and have completed at least one if not all key elements of the E/M (history, physical exam, and MDM). Additional findings are as noted.      Initial Screens:        Skyler Coma Scale  Eye Opening: Spontaneous  Best Verbal Response: Oriented  Best Motor Response: Obeys commands  [place] Coma Scale Score: 15    Vitals:    Vitals:    09/24/24 1034 09/24/24 1035   Pulse: 68    Resp: 18    Temp: 97.7 °F (36.5 °C)    SpO2: 99%    Weight:  95.3 kg (210 lb)   Height:  1.88 m (6' 2\")       CHIEF COMPLAINT     No chief complaint on file.            DIAGNOSTIC RESULTS             RADIOLOGY:   CT LUMBAR SPINE BONY RECONSTRUCTION    (Results Pending)   CT THORACIC SPINE BONY RECONSTRUCTION    (Results Pending)   CT CHEST ABDOMEN PELVIS W CONTRAST Additional Contrast? None    (Results Pending)   CT CERVICAL SPINE WO CONTRAST    (Results Pending)   CT HEAD WO CONTRAST    (Results 
midline spine tenderness.  Pelvis is stable.   Skin:     Findings: Lesion present.      Comments: Abrasion noted to the bridge of the nose as well as the left forehead extending into the left temporal region   Neurological:      Mental Status: He is alert and oriented to person, place, and time.      Sensory: No sensory deficit.      Motor: Weakness present.      Comments: Patient reports he has sensation in all 4 extremities but is not able to move his bilateral upper extremities or his left lower extremity.       DDX/DIAGNOSTIC RESULTS / EMERGENCY DEPARTMENT COURSE / MDM     Medical Decision Making  Male patient who was brought in as a trauma alert after patient fell approximately 10 feet out of a window while at work.  Patient fell head first into a dumpster.  No loss of consciousness.  EMS got there about 10 minutes after the fall.  Patient reporting that he is not able to move his bilateral arms and left lower leg.  Patient is not on any blood thinners.  He denies any headache or vision changes.  No chest pain or shortness of breath.  No abdominal pain.  Unsure of when his tetanus was last updated.  GCS 15 on arrival.  Vital signs are stable.  Saturating well on room air.  Pupils 2 mm equal and reactive bilaterally.  EOMI.  No septal hematoma.  No hemotympanum bilaterally.  Patient does have abrasion to the bridge of his nose as well as to his left forehead and along the left temporal region.  C-collar in place.  Trachea midline.  Bilateral breath sounds present.  No crepitus over the chest.  No anterior chest wall tenderness.  Abdomen soft, nontender, nondistended.  No rebound or guarding.  Pelvis is stable.  Patient does feel us touching him in all 4 of his extremities but he is not able to move his bilateral arms or his left leg on his own.  Pulses 2+ in all 4 extremities.  On logroll, patient did not have any midline spine tenderness.  No step-offs or deformities.  Trauma team at bedside.  Trauma scans and

## 2024-09-25 ENCOUNTER — APPOINTMENT (OUTPATIENT)
Dept: GENERAL RADIOLOGY | Age: 54
DRG: 912 | End: 2024-09-25
Payer: COMMERCIAL

## 2024-09-25 ENCOUNTER — ANESTHESIA (OUTPATIENT)
Dept: OPERATING ROOM | Age: 54
End: 2024-09-25
Payer: COMMERCIAL

## 2024-09-25 ENCOUNTER — ANESTHESIA EVENT (OUTPATIENT)
Dept: OPERATING ROOM | Age: 54
End: 2024-09-25
Payer: COMMERCIAL

## 2024-09-25 PROBLEM — S14.105A C5-C7 LEVEL SPINAL CORD INJURY (HCC): Status: ACTIVE | Noted: 2024-09-25
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
## 2024-09-25 LAB
ALLEN TEST: ABNORMAL
ANION GAP SERPL CALCULATED.3IONS-SCNC: 7 MMOL/L (ref 9–16)
ANION GAP SERPL CALCULATED.3IONS-SCNC: 9 MMOL/L (ref 9–16)
ARTERIAL PATENCY WRIST A: ABNORMAL
BASOPHILS # BLD: 0 K/UL (ref 0–0.2)
BASOPHILS # BLD: 0.03 K/UL (ref 0–0.2)
BASOPHILS NFR BLD: 0 % (ref 0–2)
BASOPHILS NFR BLD: 1 % (ref 0–2)
BODY TEMPERATURE: 37
BUN SERPL-MCNC: 11 MG/DL (ref 6–20)
BUN SERPL-MCNC: 14 MG/DL (ref 6–20)
CA-I BLD-SCNC: 1.11 MMOL/L (ref 1.13–1.33)
CA-I BLD-SCNC: 1.17 MMOL/L (ref 1.13–1.33)
CALCIUM SERPL-MCNC: 8.1 MG/DL (ref 8.6–10.4)
CALCIUM SERPL-MCNC: 8.3 MG/DL (ref 8.6–10.4)
CHLORIDE SERPL-SCNC: 105 MMOL/L (ref 98–107)
CHLORIDE SERPL-SCNC: 105 MMOL/L (ref 98–107)
CHLORIDE, WHOLE BLOOD: 108 MMOL/L (ref 98–110)
CO2 SERPL-SCNC: 24 MMOL/L (ref 20–31)
CO2 SERPL-SCNC: 26 MMOL/L (ref 20–31)
COHGB MFR BLD: 2.5 % (ref 0–5)
CREAT SERPL-MCNC: 0.9 MG/DL (ref 0.7–1.2)
CREAT SERPL-MCNC: 0.9 MG/DL (ref 0.7–1.2)
EKG ATRIAL RATE: 70 BPM
EKG P AXIS: 28 DEGREES
EKG P-R INTERVAL: 120 MS
EKG Q-T INTERVAL: 382 MS
EKG QRS DURATION: 96 MS
EKG QTC CALCULATION (BAZETT): 412 MS
EKG R AXIS: -20 DEGREES
EKG T AXIS: 58 DEGREES
EKG VENTRICULAR RATE: 70 BPM
EOSINOPHIL # BLD: 0 K/UL (ref 0–0.4)
EOSINOPHIL # BLD: 0.1 K/UL (ref 0–0.44)
EOSINOPHILS RELATIVE PERCENT: 0 % (ref 1–4)
EOSINOPHILS RELATIVE PERCENT: 2 % (ref 1–4)
ERYTHROCYTE [DISTWIDTH] IN BLOOD BY AUTOMATED COUNT: 13.3 % (ref 11.8–14.4)
ERYTHROCYTE [DISTWIDTH] IN BLOOD BY AUTOMATED COUNT: 13.5 % (ref 11.8–14.4)
FIO2 ON VENT: 60 %
FIO2: 50
GFR, ESTIMATED: >90 ML/MIN/1.73M2
GFR, ESTIMATED: >90 ML/MIN/1.73M2
GLUCOSE BLD-MCNC: 103 MG/DL (ref 75–110)
GLUCOSE BLD-MCNC: 116 MG/DL (ref 75–110)
GLUCOSE SERPL-MCNC: 101 MG/DL (ref 74–99)
GLUCOSE SERPL-MCNC: 129 MG/DL (ref 74–99)
HCO3 ARTERIAL: 25 MMOL/L (ref 22–27)
HCT VFR BLD AUTO: 40.1 % (ref 40.7–50.3)
HCT VFR BLD AUTO: 41.3 % (ref 40.7–50.3)
HCT VFR BLD CALC: 40.3 % (ref 40.7–50.3)
HEMOGLOBIN: 13.1 GM/DL (ref 13–17)
HGB BLD-MCNC: 13.4 G/DL (ref 13–17)
HGB BLD-MCNC: 13.5 G/DL (ref 13–17)
IMM GRANULOCYTES # BLD AUTO: 0 K/UL (ref 0–0.3)
IMM GRANULOCYTES # BLD AUTO: <0.03 K/UL (ref 0–0.3)
IMM GRANULOCYTES NFR BLD: 0 %
IMM GRANULOCYTES NFR BLD: 0 %
LACTIC ACID, WHOLE BLOOD: 1.5 MMOL/L (ref 0.7–2.1)
LYMPHOCYTES NFR BLD: 0.5 K/UL (ref 1–4.8)
LYMPHOCYTES NFR BLD: 2.11 K/UL (ref 1.1–3.7)
LYMPHOCYTES RELATIVE PERCENT: 32 % (ref 24–43)
LYMPHOCYTES RELATIVE PERCENT: 9 % (ref 24–44)
MAGNESIUM SERPL-MCNC: 1.6 MG/DL (ref 1.6–2.6)
MCH RBC QN AUTO: 32 PG (ref 25.2–33.5)
MCH RBC QN AUTO: 32.4 PG (ref 25.2–33.5)
MCHC RBC AUTO-ENTMCNC: 32.4 G/DL (ref 28.4–34.8)
MCHC RBC AUTO-ENTMCNC: 33.7 G/DL (ref 28.4–34.8)
MCV RBC AUTO: 100 FL (ref 82.6–102.9)
MCV RBC AUTO: 95 FL (ref 82.6–102.9)
MODE: ABNORMAL
MONOCYTES NFR BLD: 0 % (ref 1–7)
MONOCYTES NFR BLD: 0 K/UL (ref 0.1–0.8)
MONOCYTES NFR BLD: 0.8 K/UL (ref 0.1–1.2)
MONOCYTES NFR BLD: 12 % (ref 3–12)
MORPHOLOGY: NORMAL
NEUTROPHILS NFR BLD: 53 % (ref 36–65)
NEUTROPHILS NFR BLD: 91 % (ref 36–66)
NEUTS SEG NFR BLD: 3.48 K/UL (ref 1.5–8.1)
NEUTS SEG NFR BLD: 5.1 K/UL (ref 1.8–7.7)
NRBC BLD-RTO: 0 PER 100 WBC
NRBC BLD-RTO: 0 PER 100 WBC
O2 DELIVERY DEVICE: ABNORMAL
O2 SAT, ARTERIAL: 99.6 % (ref 94–100)
PCO2 ARTERIAL: 34.4 MMHG (ref 32–45)
PH ARTERIAL: 7.47 (ref 7.35–7.45)
PHOSPHATE SERPL-MCNC: 1.4 MG/DL (ref 2.5–4.5)
PLATELET # BLD AUTO: 229 K/UL (ref 138–453)
PLATELET # BLD AUTO: 238 K/UL (ref 138–453)
PMV BLD AUTO: 9.6 FL (ref 8.1–13.5)
PMV BLD AUTO: 9.8 FL (ref 8.1–13.5)
PO2 ARTERIAL: 150 MMHG (ref 75–95)
POC HCO3: 25.8 MMOL/L (ref 21–28)
POC O2 SATURATION: 98.8 % (ref 94–98)
POC PCO2: 36.7 MM HG (ref 35–48)
POC PH: 7.45 (ref 7.35–7.45)
POC PO2: 118.6 MM HG (ref 83–108)
POSITIVE BASE EXCESS, ART: 1.9 MMOL/L (ref 0–3)
POSITIVE BASE EXCESS, ART: 2.1 MMOL/L (ref 0–2)
POTASSIUM SERPL-SCNC: 4.3 MMOL/L (ref 3.7–5.3)
POTASSIUM SERPL-SCNC: 4.7 MMOL/L (ref 3.7–5.3)
POTASSIUM, WHOLE BLOOD: 4.1 MMOL/L (ref 3.6–5)
RBC # BLD AUTO: 4.13 M/UL (ref 4.21–5.77)
RBC # BLD AUTO: 4.22 M/UL (ref 4.21–5.77)
SAMPLE SITE: ABNORMAL
SODIUM SERPL-SCNC: 138 MMOL/L (ref 136–145)
SODIUM SERPL-SCNC: 138 MMOL/L (ref 136–145)
SODIUM, WHOLE BLOOD: 137 MMOL/L (ref 136–145)
WBC OTHER # BLD: 5.6 K/UL (ref 3.5–11.3)
WBC OTHER # BLD: 6.5 K/UL (ref 3.5–11.3)

## 2024-09-25 PROCEDURE — 2709999900 HC NON-CHARGEABLE SUPPLY: Performed by: NEUROLOGICAL SURGERY

## 2024-09-25 PROCEDURE — C1713 ANCHOR/SCREW BN/BN,TIS/BN: HCPCS | Performed by: NEUROLOGICAL SURGERY

## 2024-09-25 PROCEDURE — 2720000010 HC SURG SUPPLY STERILE: Performed by: NEUROLOGICAL SURGERY

## 2024-09-25 PROCEDURE — 83735 ASSAY OF MAGNESIUM: CPT

## 2024-09-25 PROCEDURE — 6360000002 HC RX W HCPCS: Performed by: NURSE PRACTITIONER

## 2024-09-25 PROCEDURE — 6360000002 HC RX W HCPCS: Performed by: NEUROLOGICAL SURGERY

## 2024-09-25 PROCEDURE — 63015 REMOVE SPINE LAMINA >2 CRVCL: CPT | Performed by: PHYSICIAN ASSISTANT

## 2024-09-25 PROCEDURE — 85014 HEMATOCRIT: CPT

## 2024-09-25 PROCEDURE — 37799 UNLISTED PX VASCULAR SURGERY: CPT

## 2024-09-25 PROCEDURE — 71045 X-RAY EXAM CHEST 1 VIEW: CPT

## 2024-09-25 PROCEDURE — 2500000003 HC RX 250 WO HCPCS: Performed by: NEUROLOGICAL SURGERY

## 2024-09-25 PROCEDURE — 22614 ARTHRD PST TQ 1NTRSPC EA ADD: CPT | Performed by: NEUROLOGICAL SURGERY

## 2024-09-25 PROCEDURE — 22600 ARTHRD PST TQ 1NTRSPC CRV: CPT | Performed by: NEUROLOGICAL SURGERY

## 2024-09-25 PROCEDURE — 6370000000 HC RX 637 (ALT 250 FOR IP): Performed by: NURSE PRACTITIONER

## 2024-09-25 PROCEDURE — 87086 URINE CULTURE/COLONY COUNT: CPT

## 2024-09-25 PROCEDURE — 2500000003 HC RX 250 WO HCPCS

## 2024-09-25 PROCEDURE — 2580000003 HC RX 258: Performed by: NEUROLOGICAL SURGERY

## 2024-09-25 PROCEDURE — 85018 HEMOGLOBIN: CPT

## 2024-09-25 PROCEDURE — 22842 INSERT SPINE FIXATION DEVICE: CPT | Performed by: PHYSICIAN ASSISTANT

## 2024-09-25 PROCEDURE — 6360000002 HC RX W HCPCS

## 2024-09-25 PROCEDURE — 5A1935Z RESPIRATORY VENTILATION, LESS THAN 24 CONSECUTIVE HOURS: ICD-10-PCS | Performed by: NEUROLOGICAL SURGERY

## 2024-09-25 PROCEDURE — 3700000001 HC ADD 15 MINUTES (ANESTHESIA): Performed by: NEUROLOGICAL SURGERY

## 2024-09-25 PROCEDURE — 83605 ASSAY OF LACTIC ACID: CPT

## 2024-09-25 PROCEDURE — 85025 COMPLETE CBC W/AUTO DIFF WBC: CPT

## 2024-09-25 PROCEDURE — 22600 ARTHRD PST TQ 1NTRSPC CRV: CPT | Performed by: PHYSICIAN ASSISTANT

## 2024-09-25 PROCEDURE — 6360000002 HC RX W HCPCS: Performed by: SURGERY

## 2024-09-25 PROCEDURE — 82805 BLOOD GASES W/O2 SATURATION: CPT

## 2024-09-25 PROCEDURE — 2580000003 HC RX 258

## 2024-09-25 PROCEDURE — 80048 BASIC METABOLIC PNL TOTAL CA: CPT

## 2024-09-25 PROCEDURE — 6370000000 HC RX 637 (ALT 250 FOR IP): Performed by: PHYSICIAN ASSISTANT

## 2024-09-25 PROCEDURE — 51701 INSERT BLADDER CATHETER: CPT

## 2024-09-25 PROCEDURE — 3600000014 HC SURGERY LEVEL 4 ADDTL 15MIN: Performed by: NEUROLOGICAL SURGERY

## 2024-09-25 PROCEDURE — 84100 ASSAY OF PHOSPHORUS: CPT

## 2024-09-25 PROCEDURE — 84132 ASSAY OF SERUM POTASSIUM: CPT

## 2024-09-25 PROCEDURE — 2000000000 HC ICU R&B

## 2024-09-25 PROCEDURE — 94002 VENT MGMT INPAT INIT DAY: CPT

## 2024-09-25 PROCEDURE — 82947 ASSAY GLUCOSE BLOOD QUANT: CPT

## 2024-09-25 PROCEDURE — 22842 INSERT SPINE FIXATION DEVICE: CPT | Performed by: NEUROLOGICAL SURGERY

## 2024-09-25 PROCEDURE — 0RG1071 FUSION OF CERVICAL VERTEBRAL JOINT WITH AUTOLOGOUS TISSUE SUBSTITUTE, POSTERIOR APPROACH, POSTERIOR COLUMN, OPEN APPROACH: ICD-10-PCS | Performed by: NEUROLOGICAL SURGERY

## 2024-09-25 PROCEDURE — 6370000000 HC RX 637 (ALT 250 FOR IP)

## 2024-09-25 PROCEDURE — C9290 INJ, BUPIVACAINE LIPOSOME: HCPCS | Performed by: NEUROLOGICAL SURGERY

## 2024-09-25 PROCEDURE — 82330 ASSAY OF CALCIUM: CPT

## 2024-09-25 PROCEDURE — 6370000000 HC RX 637 (ALT 250 FOR IP): Performed by: NEUROLOGICAL SURGERY

## 2024-09-25 PROCEDURE — 73502 X-RAY EXAM HIP UNI 2-3 VIEWS: CPT

## 2024-09-25 PROCEDURE — 73130 X-RAY EXAM OF HAND: CPT

## 2024-09-25 PROCEDURE — 36415 COLL VENOUS BLD VENIPUNCTURE: CPT

## 2024-09-25 PROCEDURE — 82962 GLUCOSE BLOOD TEST: CPT

## 2024-09-25 PROCEDURE — 94761 N-INVAS EAR/PLS OXIMETRY MLT: CPT

## 2024-09-25 PROCEDURE — 82435 ASSAY OF BLOOD CHLORIDE: CPT

## 2024-09-25 PROCEDURE — 2700000000 HC OXYGEN THERAPY PER DAY

## 2024-09-25 PROCEDURE — 22614 ARTHRD PST TQ 1NTRSPC EA ADD: CPT | Performed by: PHYSICIAN ASSISTANT

## 2024-09-25 PROCEDURE — L0120 CERV FLEX N/ADJ FOAM PRE OTS: HCPCS | Performed by: NEUROLOGICAL SURGERY

## 2024-09-25 PROCEDURE — 3600000004 HC SURGERY LEVEL 4 BASE: Performed by: NEUROLOGICAL SURGERY

## 2024-09-25 PROCEDURE — 51798 US URINE CAPACITY MEASURE: CPT

## 2024-09-25 PROCEDURE — 82803 BLOOD GASES ANY COMBINATION: CPT

## 2024-09-25 PROCEDURE — 3700000000 HC ANESTHESIA ATTENDED CARE: Performed by: NEUROLOGICAL SURGERY

## 2024-09-25 PROCEDURE — 84295 ASSAY OF SERUM SODIUM: CPT

## 2024-09-25 PROCEDURE — 6360000002 HC RX W HCPCS: Performed by: PHYSICIAN ASSISTANT

## 2024-09-25 PROCEDURE — 01N10ZZ RELEASE CERVICAL NERVE, OPEN APPROACH: ICD-10-PCS | Performed by: NEUROLOGICAL SURGERY

## 2024-09-25 PROCEDURE — 2580000003 HC RX 258: Performed by: ANESTHESIOLOGY

## 2024-09-25 PROCEDURE — 63015 REMOVE SPINE LAMINA >2 CRVCL: CPT | Performed by: NEUROLOGICAL SURGERY

## 2024-09-25 PROCEDURE — 93010 ELECTROCARDIOGRAM REPORT: CPT | Performed by: INTERNAL MEDICINE

## 2024-09-25 PROCEDURE — 99232 SBSQ HOSP IP/OBS MODERATE 35: CPT | Performed by: SURGERY

## 2024-09-25 PROCEDURE — P9045 ALBUMIN (HUMAN), 5%, 250 ML: HCPCS

## 2024-09-25 PROCEDURE — 2580000003 HC RX 258: Performed by: PHYSICIAN ASSISTANT

## 2024-09-25 DEVICE — MULTI AXIAL SCREW 3603514 3.5 X 14MM
Type: IMPLANTABLE DEVICE | Site: BACK | Status: FUNCTIONAL
Brand: INFINITY™ OCCIPITOCERVICAL UPPER THORACIC SYSTEM

## 2024-09-25 DEVICE — GRAFT CELLR BNE MATRX INFLUX SPARC 5CC: Type: IMPLANTABLE DEVICE | Site: BACK | Status: FUNCTIONAL

## 2024-09-25 RX ORDER — OXYCODONE HYDROCHLORIDE 5 MG/1
5 TABLET ORAL EVERY 4 HOURS PRN
Status: DISCONTINUED | OUTPATIENT
Start: 2024-09-25 | End: 2024-09-30

## 2024-09-25 RX ORDER — SODIUM CHLORIDE 0.9 % (FLUSH) 0.9 %
5-40 SYRINGE (ML) INJECTION PRN
Status: DISCONTINUED | OUTPATIENT
Start: 2024-09-25 | End: 2024-10-01

## 2024-09-25 RX ORDER — FENTANYL CITRATE 50 UG/ML
25 INJECTION, SOLUTION INTRAMUSCULAR; INTRAVENOUS EVERY 5 MIN PRN
Status: DISCONTINUED | OUTPATIENT
Start: 2024-09-25 | End: 2024-09-26

## 2024-09-25 RX ORDER — ONDANSETRON 2 MG/ML
4 INJECTION INTRAMUSCULAR; INTRAVENOUS
Status: ACTIVE | OUTPATIENT
Start: 2024-09-25 | End: 2024-09-26

## 2024-09-25 RX ORDER — PROPOFOL 10 MG/ML
5-50 INJECTION, EMULSION INTRAVENOUS CONTINUOUS
Status: DISCONTINUED | OUTPATIENT
Start: 2024-09-25 | End: 2024-09-26

## 2024-09-25 RX ORDER — MIDAZOLAM HYDROCHLORIDE 2 MG/2ML
1 INJECTION, SOLUTION INTRAMUSCULAR; INTRAVENOUS EVERY 10 MIN PRN
Status: DISCONTINUED | OUTPATIENT
Start: 2024-09-25 | End: 2024-10-02 | Stop reason: HOSPADM

## 2024-09-25 RX ORDER — VANCOMYCIN HYDROCHLORIDE 1 G/20ML
INJECTION, POWDER, LYOPHILIZED, FOR SOLUTION INTRAVENOUS PRN
Status: DISCONTINUED | OUTPATIENT
Start: 2024-09-25 | End: 2024-09-25 | Stop reason: HOSPADM

## 2024-09-25 RX ORDER — SODIUM CHLORIDE 9 MG/ML
INJECTION, SOLUTION INTRAVENOUS CONTINUOUS
Status: DISCONTINUED | OUTPATIENT
Start: 2024-09-25 | End: 2024-09-27

## 2024-09-25 RX ORDER — MIDAZOLAM HYDROCHLORIDE 1 MG/ML
INJECTION INTRAMUSCULAR; INTRAVENOUS
Status: DISCONTINUED | OUTPATIENT
Start: 2024-09-25 | End: 2024-09-25 | Stop reason: SDUPTHER

## 2024-09-25 RX ORDER — CEFAZOLIN SODIUM 1 G/3ML
INJECTION, POWDER, FOR SOLUTION INTRAMUSCULAR; INTRAVENOUS
Status: DISCONTINUED | OUTPATIENT
Start: 2024-09-25 | End: 2024-09-25 | Stop reason: SDUPTHER

## 2024-09-25 RX ORDER — PROPOFOL 10 MG/ML
INJECTION, EMULSION INTRAVENOUS
Status: DISCONTINUED | OUTPATIENT
Start: 2024-09-25 | End: 2024-09-25 | Stop reason: SDUPTHER

## 2024-09-25 RX ORDER — CHLORHEXIDINE GLUCONATE ORAL RINSE 1.2 MG/ML
15 SOLUTION DENTAL 2 TIMES DAILY
Status: DISCONTINUED | OUTPATIENT
Start: 2024-09-25 | End: 2024-10-01

## 2024-09-25 RX ORDER — SODIUM CHLORIDE 0.9 % (FLUSH) 0.9 %
5-40 SYRINGE (ML) INJECTION EVERY 12 HOURS SCHEDULED
Status: DISCONTINUED | OUTPATIENT
Start: 2024-09-25 | End: 2024-10-01

## 2024-09-25 RX ORDER — SODIUM CHLORIDE, SODIUM LACTATE, POTASSIUM CHLORIDE, CALCIUM CHLORIDE 600; 310; 30; 20 MG/100ML; MG/100ML; MG/100ML; MG/100ML
INJECTION, SOLUTION INTRAVENOUS
Status: DISCONTINUED | OUTPATIENT
Start: 2024-09-25 | End: 2024-09-25 | Stop reason: SDUPTHER

## 2024-09-25 RX ORDER — POLYETHYLENE GLYCOL 3350 17 G/17G
17 POWDER, FOR SOLUTION ORAL DAILY
Status: DISCONTINUED | OUTPATIENT
Start: 2024-09-25 | End: 2024-10-02 | Stop reason: HOSPADM

## 2024-09-25 RX ORDER — PHENYLEPHRINE HCL IN 0.9% NACL 1 MG/10 ML
SYRINGE (ML) INTRAVENOUS
Status: DISCONTINUED | OUTPATIENT
Start: 2024-09-25 | End: 2024-09-25 | Stop reason: SDUPTHER

## 2024-09-25 RX ORDER — ROCURONIUM BROMIDE 10 MG/ML
INJECTION, SOLUTION INTRAVENOUS
Status: DISCONTINUED | OUTPATIENT
Start: 2024-09-25 | End: 2024-09-25 | Stop reason: SDUPTHER

## 2024-09-25 RX ORDER — FENTANYL CITRATE 50 UG/ML
50 INJECTION, SOLUTION INTRAMUSCULAR; INTRAVENOUS EVERY 10 MIN PRN
Status: DISCONTINUED | OUTPATIENT
Start: 2024-09-25 | End: 2024-09-26

## 2024-09-25 RX ORDER — MAGNESIUM HYDROXIDE 1200 MG/15ML
LIQUID ORAL CONTINUOUS PRN
Status: DISCONTINUED | OUTPATIENT
Start: 2024-09-25 | End: 2024-09-25 | Stop reason: HOSPADM

## 2024-09-25 RX ORDER — DEXAMETHASONE SODIUM PHOSPHATE 10 MG/ML
INJECTION, SOLUTION INTRAMUSCULAR; INTRAVENOUS
Status: DISCONTINUED | OUTPATIENT
Start: 2024-09-25 | End: 2024-09-25 | Stop reason: SDUPTHER

## 2024-09-25 RX ORDER — ALBUMIN, HUMAN INJ 5% 5 %
SOLUTION INTRAVENOUS
Status: DISCONTINUED | OUTPATIENT
Start: 2024-09-25 | End: 2024-09-25 | Stop reason: SDUPTHER

## 2024-09-25 RX ORDER — SODIUM CHLORIDE 9 MG/ML
INJECTION, SOLUTION INTRAVENOUS PRN
Status: DISCONTINUED | OUTPATIENT
Start: 2024-09-25 | End: 2024-10-01

## 2024-09-25 RX ORDER — LIDOCAINE HYDROCHLORIDE 10 MG/ML
INJECTION, SOLUTION EPIDURAL; INFILTRATION; INTRACAUDAL; PERINEURAL
Status: DISCONTINUED | OUTPATIENT
Start: 2024-09-25 | End: 2024-09-25 | Stop reason: SDUPTHER

## 2024-09-25 RX ORDER — NALOXONE HYDROCHLORIDE 0.4 MG/ML
INJECTION, SOLUTION INTRAMUSCULAR; INTRAVENOUS; SUBCUTANEOUS PRN
Status: DISCONTINUED | OUTPATIENT
Start: 2024-09-25 | End: 2024-10-02 | Stop reason: HOSPADM

## 2024-09-25 RX ORDER — SENNOSIDES A AND B 8.6 MG/1
1 TABLET, FILM COATED ORAL DAILY PRN
Status: DISCONTINUED | OUTPATIENT
Start: 2024-09-25 | End: 2024-10-02 | Stop reason: HOSPADM

## 2024-09-25 RX ORDER — BISACODYL 10 MG
10 SUPPOSITORY, RECTAL RECTAL DAILY PRN
Status: DISCONTINUED | OUTPATIENT
Start: 2024-09-25 | End: 2024-10-02 | Stop reason: HOSPADM

## 2024-09-25 RX ORDER — FENTANYL CITRATE 50 UG/ML
INJECTION, SOLUTION INTRAMUSCULAR; INTRAVENOUS
Status: DISCONTINUED | OUTPATIENT
Start: 2024-09-25 | End: 2024-09-25 | Stop reason: SDUPTHER

## 2024-09-25 RX ORDER — SODIUM CHLORIDE 0.9 % (FLUSH) 0.9 %
5-40 SYRINGE (ML) INJECTION PRN
Status: DISCONTINUED | OUTPATIENT
Start: 2024-09-25 | End: 2024-10-02 | Stop reason: HOSPADM

## 2024-09-25 RX ORDER — ENOXAPARIN SODIUM 100 MG/ML
40 INJECTION SUBCUTANEOUS DAILY
Status: DISCONTINUED | OUTPATIENT
Start: 2024-09-27 | End: 2024-10-02 | Stop reason: HOSPADM

## 2024-09-25 RX ORDER — SODIUM CHLORIDE 0.9 % (FLUSH) 0.9 %
SYRINGE (ML) INJECTION PRN
Status: DISCONTINUED | OUTPATIENT
Start: 2024-09-25 | End: 2024-09-25 | Stop reason: HOSPADM

## 2024-09-25 RX ORDER — FENTANYL CITRATE 50 UG/ML
50 INJECTION, SOLUTION INTRAMUSCULAR; INTRAVENOUS EVERY 5 MIN PRN
Status: DISCONTINUED | OUTPATIENT
Start: 2024-09-25 | End: 2024-09-26

## 2024-09-25 RX ORDER — OXYCODONE HYDROCHLORIDE 5 MG/1
10 TABLET ORAL EVERY 4 HOURS PRN
Status: DISCONTINUED | OUTPATIENT
Start: 2024-09-25 | End: 2024-09-30

## 2024-09-25 RX ORDER — LIDOCAINE HYDROCHLORIDE AND EPINEPHRINE 10; 10 MG/ML; UG/ML
INJECTION, SOLUTION INFILTRATION; PERINEURAL PRN
Status: DISCONTINUED | OUTPATIENT
Start: 2024-09-25 | End: 2024-09-25 | Stop reason: HOSPADM

## 2024-09-25 RX ORDER — ONDANSETRON 2 MG/ML
INJECTION INTRAMUSCULAR; INTRAVENOUS
Status: DISCONTINUED | OUTPATIENT
Start: 2024-09-25 | End: 2024-09-25 | Stop reason: SDUPTHER

## 2024-09-25 RX ADMIN — SODIUM CHLORIDE, PRESERVATIVE FREE 10 ML: 5 INJECTION INTRAVENOUS at 20:43

## 2024-09-25 RX ADMIN — Medication 100 MCG: at 14:29

## 2024-09-25 RX ADMIN — Medication 100 MCG: at 14:16

## 2024-09-25 RX ADMIN — PROPOFOL 50 MG: 10 INJECTION, EMULSION INTRAVENOUS at 14:21

## 2024-09-25 RX ADMIN — ROCURONIUM BROMIDE 20 MG: 10 INJECTION, SOLUTION INTRAVENOUS at 15:53

## 2024-09-25 RX ADMIN — FENTANYL CITRATE 25 MCG: 50 INJECTION, SOLUTION INTRAMUSCULAR; INTRAVENOUS at 15:42

## 2024-09-25 RX ADMIN — SODIUM CHLORIDE, POTASSIUM CHLORIDE, SODIUM LACTATE AND CALCIUM CHLORIDE: 600; 310; 30; 20 INJECTION, SOLUTION INTRAVENOUS at 14:23

## 2024-09-25 RX ADMIN — Medication 100 MCG: at 14:57

## 2024-09-25 RX ADMIN — Medication 100 MCG: at 14:55

## 2024-09-25 RX ADMIN — ACETAMINOPHEN 1000 MG: 500 TABLET ORAL at 20:43

## 2024-09-25 RX ADMIN — FENTANYL CITRATE 25 MCG: 50 INJECTION, SOLUTION INTRAMUSCULAR; INTRAVENOUS at 12:47

## 2024-09-25 RX ADMIN — Medication 100 MCG: at 14:20

## 2024-09-25 RX ADMIN — PROPOFOL 50 MCG/KG/MIN: 10 INJECTION, EMULSION INTRAVENOUS at 17:57

## 2024-09-25 RX ADMIN — FAMOTIDINE 20 MG: 10 INJECTION, SOLUTION INTRAVENOUS at 20:48

## 2024-09-25 RX ADMIN — Medication 100 MCG: at 14:14

## 2024-09-25 RX ADMIN — PROPOFOL 150 MG: 10 INJECTION, EMULSION INTRAVENOUS at 14:00

## 2024-09-25 RX ADMIN — Medication 10 MG: at 16:11

## 2024-09-25 RX ADMIN — FENTANYL CITRATE 25 MCG: 50 INJECTION, SOLUTION INTRAMUSCULAR; INTRAVENOUS at 00:23

## 2024-09-25 RX ADMIN — ACETAMINOPHEN 1000 MG: 500 TABLET ORAL at 05:27

## 2024-09-25 RX ADMIN — FENTANYL CITRATE 25 MCG: 50 INJECTION, SOLUTION INTRAMUSCULAR; INTRAVENOUS at 05:27

## 2024-09-25 RX ADMIN — ROCURONIUM BROMIDE 20 MG: 10 INJECTION, SOLUTION INTRAVENOUS at 16:45

## 2024-09-25 RX ADMIN — METHOCARBAMOL 750 MG: 750 TABLET ORAL at 20:43

## 2024-09-25 RX ADMIN — Medication 100 MCG: at 17:43

## 2024-09-25 RX ADMIN — LIDOCAINE HYDROCHLORIDE 50 MG: 10 INJECTION, SOLUTION EPIDURAL; INFILTRATION; INTRACAUDAL; PERINEURAL at 14:00

## 2024-09-25 RX ADMIN — ROCURONIUM BROMIDE 30 MG: 10 INJECTION, SOLUTION INTRAVENOUS at 15:11

## 2024-09-25 RX ADMIN — FENTANYL CITRATE 25 MCG: 50 INJECTION, SOLUTION INTRAMUSCULAR; INTRAVENOUS at 07:40

## 2024-09-25 RX ADMIN — Medication 100 MCG: at 16:53

## 2024-09-25 RX ADMIN — SODIUM CHLORIDE, POTASSIUM CHLORIDE, SODIUM LACTATE AND CALCIUM CHLORIDE: 600; 310; 30; 20 INJECTION, SOLUTION INTRAVENOUS at 17:01

## 2024-09-25 RX ADMIN — ROCURONIUM BROMIDE 20 MG: 10 INJECTION, SOLUTION INTRAVENOUS at 14:48

## 2024-09-25 RX ADMIN — DEXAMETHASONE SODIUM PHOSPHATE 10 MG: 10 INJECTION INTRAMUSCULAR; INTRAVENOUS at 15:05

## 2024-09-25 RX ADMIN — METHOCARBAMOL 750 MG: 750 TABLET ORAL at 09:00

## 2024-09-25 RX ADMIN — Medication 2000 MG: at 20:42

## 2024-09-25 RX ADMIN — FENTANYL CITRATE 25 MCG: 50 INJECTION, SOLUTION INTRAMUSCULAR; INTRAVENOUS at 15:37

## 2024-09-25 RX ADMIN — Medication 20 MG: at 14:51

## 2024-09-25 RX ADMIN — Medication 100 MCG: at 14:53

## 2024-09-25 RX ADMIN — SODIUM CHLORIDE, POTASSIUM CHLORIDE, SODIUM LACTATE AND CALCIUM CHLORIDE: 600; 310; 30; 20 INJECTION, SOLUTION INTRAVENOUS at 08:10

## 2024-09-25 RX ADMIN — FENTANYL CITRATE 25 MCG: 50 INJECTION, SOLUTION INTRAMUSCULAR; INTRAVENOUS at 17:27

## 2024-09-25 RX ADMIN — Medication 100 MCG: at 16:57

## 2024-09-25 RX ADMIN — GABAPENTIN 300 MG: 300 CAPSULE ORAL at 20:43

## 2024-09-25 RX ADMIN — 0.12% CHLORHEXIDINE GLUCONATE 15 ML: 1.2 RINSE ORAL at 20:49

## 2024-09-25 RX ADMIN — Medication 100 MCG: at 15:02

## 2024-09-25 RX ADMIN — Medication 100 MCG: at 14:46

## 2024-09-25 RX ADMIN — SENNOSIDES AND DOCUSATE SODIUM 1 TABLET: 50; 8.6 TABLET ORAL at 09:00

## 2024-09-25 RX ADMIN — CEFAZOLIN 2 G: 1 INJECTION, POWDER, FOR SOLUTION INTRAMUSCULAR; INTRAVENOUS at 14:40

## 2024-09-25 RX ADMIN — FENTANYL CITRATE 25 MCG: 50 INJECTION, SOLUTION INTRAMUSCULAR; INTRAVENOUS at 13:57

## 2024-09-25 RX ADMIN — OXYCODONE 5 MG: 5 TABLET ORAL at 04:44

## 2024-09-25 RX ADMIN — SODIUM CHLORIDE: 9 INJECTION, SOLUTION INTRAVENOUS at 18:43

## 2024-09-25 RX ADMIN — PHENYLEPHRINE HYDROCHLORIDE 20 MCG/MIN: 10 INJECTION INTRAVENOUS at 14:45

## 2024-09-25 RX ADMIN — FENTANYL CITRATE 25 MCG: 50 INJECTION, SOLUTION INTRAMUSCULAR; INTRAVENOUS at 16:46

## 2024-09-25 RX ADMIN — FENTANYL CITRATE 25 MCG: 50 INJECTION, SOLUTION INTRAMUSCULAR; INTRAVENOUS at 20:56

## 2024-09-25 RX ADMIN — MIDAZOLAM 2 MG: 1 INJECTION INTRAMUSCULAR; INTRAVENOUS at 13:57

## 2024-09-25 RX ADMIN — PROPOFOL 25 MCG/KG/MIN: 10 INJECTION, EMULSION INTRAVENOUS at 22:58

## 2024-09-25 RX ADMIN — PANTOPRAZOLE SODIUM 40 MG: 40 TABLET, DELAYED RELEASE ORAL at 09:00

## 2024-09-25 RX ADMIN — Medication 200 MCG: at 14:49

## 2024-09-25 RX ADMIN — FENTANYL CITRATE 25 MCG: 50 INJECTION, SOLUTION INTRAMUSCULAR; INTRAVENOUS at 10:19

## 2024-09-25 RX ADMIN — ROCURONIUM BROMIDE 30 MG: 10 INJECTION, SOLUTION INTRAVENOUS at 17:57

## 2024-09-25 RX ADMIN — ONDANSETRON 4 MG: 2 INJECTION INTRAMUSCULAR; INTRAVENOUS at 17:29

## 2024-09-25 RX ADMIN — GABAPENTIN 300 MG: 300 CAPSULE ORAL at 09:00

## 2024-09-25 RX ADMIN — ALBUMIN (HUMAN) 12.5 G: 12.5 INJECTION, SOLUTION INTRAVENOUS at 17:14

## 2024-09-25 RX ADMIN — POLYETHYLENE GLYCOL 3350 17 G: 17 POWDER, FOR SOLUTION ORAL at 20:43

## 2024-09-25 RX ADMIN — ROCURONIUM BROMIDE 50 MG: 10 INJECTION, SOLUTION INTRAVENOUS at 14:00

## 2024-09-25 RX ADMIN — FENTANYL CITRATE 75 MCG: 50 INJECTION, SOLUTION INTRAMUSCULAR; INTRAVENOUS at 14:00

## 2024-09-25 RX ADMIN — Medication 100 MCG: at 14:40

## 2024-09-25 RX ADMIN — Medication 100 MCG: at 14:33

## 2024-09-25 ASSESSMENT — PULMONARY FUNCTION TESTS
PIF_VALUE: 22
PIF_VALUE: 24
PIF_VALUE: 23
PIF_VALUE: 22

## 2024-09-25 ASSESSMENT — PAIN DESCRIPTION - LOCATION
LOCATION: BACK
LOCATION: NECK

## 2024-09-25 ASSESSMENT — PAIN SCALES - GENERAL
PAINLEVEL_OUTOF10: 3
PAINLEVEL_OUTOF10: 8
PAINLEVEL_OUTOF10: 7
PAINLEVEL_OUTOF10: 6
PAINLEVEL_OUTOF10: 7
PAINLEVEL_OUTOF10: 7

## 2024-09-25 NOTE — OP NOTE
Operative Note      Patient: Lauro Munroe  YOB: 1970  MRN: [id number]    Date of Procedure: 9/25/2024    Pre-Op Diagnosis Codes:      * Spinal cord injury at C5-C7 level without injury of spinal bone, initial encounter ([facility]) [S14.105A]    Post-Op Diagnosis: Same       Procedure(s):  C3-C5 LAMINECTOMY  C6 superior laminotomy  C6-C7 LAMINOTOMY,  C3-C5 SEGMENTAL FIXATION   C3-C5 FUSION    Surgeon(s):  Linda Mckeon DO    Assistant:   Physician Assistant: Aamir Escamilla PA-C  Assisted in positioning, the core of the procedure including suction and retraction, and closure.     Anesthesia: General    Estimated Blood Loss (mL): less than 100     Complications: None    Specimens:   ID Type Source Tests Collected by Time Destination   1 : intial hall insertion Urine Urine, indwelling catheter CULTURE, URINE Linda Mckeon DO 9/25/2024 1418        Implants:  Implant Name Type Inv. Item Serial No.  Lot No. LRB No. Used Action   GRAFT CELLR BNE MATRX INFLUX SPARC 5CC - [id number]  GRAFT CELLR BNE MATRX INFLUX SPARC 5CC  [employer]WD [id number] N/A 4 Implanted   SCREW SPNL L14MM DIA3.5MM OCCIPITOCERVICAL UP THOR - [id number]  SCREW SPNL L14MM DIA3.5MM OCCIPITOCERVICAL UP THOR  [employer] SOFAMOR DANEK-WD  N/A 5 Implanted   SCREW SPNL L12MM DIA4MM OCCIPITOCERVICAL UP THOR MULTIAXIAL - [id number]  SCREW SPNL L12MM DIA4MM OCCIPITOCERVICAL UP THOR MULTIAXIAL  [employer] SOFAMOR DANEK-WD  N/A 1 Implanted   EMELINA SPNL L40MM DIA3.5MM OCCIPITOCERVICAL UP THOR PRECUT - [id number]  EMELINA SPNL L40MM DIA3.5MM OCCIPITOCERVICAL UP THOR PRECUT  MED[employer] SOFAMOR DANEK-WD  N/A 2 Implanted   SET SCREW SPINAL M6 - [id number]  SET SCREW SPINAL M6  MEDTR[employer]IC SOFAMOR DANEK-WD  N/A 6 Implanted         Drains:   Closed/Suction Drain Posterior Neck Bulb (Active)       Urinary Catheter 09/25/24 Hall (Active)   Urine Appearance Hazy 09/25/24 1511       Findings:  Infection Present At Time Of Surgery (PATOS)

## 2024-09-25 NOTE — CARE COORDINATION
Case Management Assessment  Initial Evaluation    Date/Time of Evaluation: 9/25/2024 11:47 AM  Assessment Completed by: PORSHA EVANS RN    If patient is discharged prior to next notation, then this note serves as note for discharge by case management.    Patient Name: Lauro Munroe                   YOB: 1970  Diagnosis: Traumatic pneumothorax, initial encounter [S27.0XXA]  Fall, initial encounter [W19.XXXA]  Central cord syndrome, subsequent encounter ([facility]) [S14.129D]  Weakness of left lower extremity [R29.898]  Weakness of both upper extremities [R29.898]                   Date / Time: 9/24/2024 10:18 AM    Patient Admission Status: Inpatient   Readmission Risk (Low < 19, Mod (19-27), High > 27): Readmission Risk Score: 10.3    Current PCP: Per Thomson MD  PCP verified by CM? Yes    Chart Reviewed: Yes      History Provided by: Patient  Patient Orientation: Alert and Oriented    Patient Cognition: Alert    Hospitalization in the last 30 days (Readmission):  No    If yes, Readmission Assessment in CM Navigator will be completed.    Advance Directives:      Code Status: Full Code   Patient's Primary Decision Maker is: Legal Next of Kin      Discharge Planning:    Patient lives with: Family Members, Parent (Sister and mother) Type of Home: House  Primary Care Giver: Self  Patient Support Systems include: Parent, Family Members   Current Financial resources: Medicaid  Current community resources:    Current services prior to admission: None            Current DME:              Type of Home Care services:  None    ADLS  Prior functional level: Independent in ADLs/IADLs  Current functional level: Assistance with the following:, Bathing, Dressing, Toileting, Feeding, Cooking, Housework, Shopping, Mobility    PT AM-PAC:   /24  OT AM-PAC:   /24    Family can provide assistance at DC:    Would you like Case Management to discuss the discharge plan with any other family members/significant others,

## 2024-09-25 NOTE — CARE COORDINATION
SBIRT-  Met with pt this a.m. was awake and alert  Pt states he uses marijuana daily  Drinks alcohol 3-4x/weekly, may have 2 beers or alcoholic beverages  Past rehab when he was young. Pt states he had a DUI and was court ordered to attend rehab at that time.  Pt declines rehab and resources .  Pt states he feels depressed at times and was suppose to go get help a while ago but never did.[facility] resources offered and pt was receptive. List of [facility] provided and pt plans to follow up and make own appt. Pt denies having any SI.              Alcohol Screening and Brief Intervention        No results for input(s): \"ALC\" in the last 72 hours.    Alcohol Pre-screening  (MEN ONLY) How many times in the past year have you had 5 or more drinks in a day?: None          Drug Pre-Screening 1       Drug Screening DAST       Mood Pre-Screening (PHQ-2)  During the past 2 weeks, have you been bothered by, feeling down, depressed or hopeless?  No        I have interviewed Lauro Munroe, [phone] regarding  His alcohol consumption/drug use and risk for excessive use. Screenings were positive.  Patient  Declined intervention at this time.     Deferred []    Completed on: 9/25/2024   CLARISA AREVALO

## 2024-09-26 ENCOUNTER — APPOINTMENT (OUTPATIENT)
Dept: GENERAL RADIOLOGY | Age: 54
DRG: 912 | End: 2024-09-26
Payer: COMMERCIAL

## 2024-09-26 ENCOUNTER — APPOINTMENT (OUTPATIENT)
Dept: CT IMAGING | Age: 54
DRG: 912 | End: 2024-09-26
Payer: COMMERCIAL

## 2024-09-26 LAB
ALLEN TEST: ABNORMAL
ANION GAP SERPL CALCULATED.3IONS-SCNC: 9 MMOL/L (ref 9–16)
BASOPHILS # BLD: <0.03 K/UL (ref 0–0.2)
BASOPHILS NFR BLD: 0 % (ref 0–2)
BUN SERPL-MCNC: 14 MG/DL (ref 6–20)
CALCIUM SERPL-MCNC: 8 MG/DL (ref 8.6–10.4)
CHLORIDE SERPL-SCNC: 107 MMOL/L (ref 98–107)
CO2 SERPL-SCNC: 23 MMOL/L (ref 20–31)
CREAT SERPL-MCNC: 0.9 MG/DL (ref 0.7–1.2)
EOSINOPHIL # BLD: <0.03 K/UL (ref 0–0.44)
EOSINOPHILS RELATIVE PERCENT: 0 % (ref 1–4)
ERYTHROCYTE [DISTWIDTH] IN BLOOD BY AUTOMATED COUNT: 13.2 % (ref 11.8–14.4)
FIO2: 50
GFR, ESTIMATED: >90 ML/MIN/1.73M2
GLUCOSE BLD-MCNC: 128 MG/DL (ref 74–100)
GLUCOSE SERPL-MCNC: 130 MG/DL (ref 74–99)
HCT VFR BLD AUTO: 35.6 % (ref 40.7–50.3)
HGB BLD-MCNC: 12 G/DL (ref 13–17)
IMM GRANULOCYTES # BLD AUTO: <0.03 K/UL (ref 0–0.3)
IMM GRANULOCYTES NFR BLD: 0 %
LYMPHOCYTES NFR BLD: 0.86 K/UL (ref 1.1–3.7)
LYMPHOCYTES RELATIVE PERCENT: 13 % (ref 24–43)
MCH RBC QN AUTO: 32 PG (ref 25.2–33.5)
MCHC RBC AUTO-ENTMCNC: 33.7 G/DL (ref 28.4–34.8)
MCV RBC AUTO: 94.9 FL (ref 82.6–102.9)
MICROORGANISM SPEC CULT: NO GROWTH
MONOCYTES NFR BLD: 0.65 K/UL (ref 0.1–1.2)
MONOCYTES NFR BLD: 10 % (ref 3–12)
NEUTROPHILS NFR BLD: 77 % (ref 36–65)
NEUTS SEG NFR BLD: 5.05 K/UL (ref 1.5–8.1)
NRBC BLD-RTO: 0 PER 100 WBC
PLATELET # BLD AUTO: 227 K/UL (ref 138–453)
PMV BLD AUTO: 9.8 FL (ref 8.1–13.5)
POC HCO3: 25.9 MMOL/L (ref 21–28)
POC LACTIC ACID: 1.1 MMOL/L (ref 0.56–1.39)
POC O2 SATURATION: 99.4 % (ref 94–98)
POC PCO2: 33 MM HG (ref 35–48)
POC PH: 7.5 (ref 7.35–7.45)
POC PO2: 143.2 MM HG (ref 83–108)
POSITIVE BASE EXCESS, ART: 3 MMOL/L (ref 0–3)
POTASSIUM SERPL-SCNC: 4.2 MMOL/L (ref 3.7–5.3)
RBC # BLD AUTO: 3.75 M/UL (ref 4.21–5.77)
SAMPLE SITE: ABNORMAL
SERVICE CMNT-IMP: NORMAL
SODIUM SERPL-SCNC: 139 MMOL/L (ref 136–145)
SPECIMEN DESCRIPTION: NORMAL
WBC OTHER # BLD: 6.6 K/UL (ref 3.5–11.3)

## 2024-09-26 PROCEDURE — 82803 BLOOD GASES ANY COMBINATION: CPT

## 2024-09-26 PROCEDURE — 2500000003 HC RX 250 WO HCPCS

## 2024-09-26 PROCEDURE — 74018 RADEX ABDOMEN 1 VIEW: CPT

## 2024-09-26 PROCEDURE — 99232 SBSQ HOSP IP/OBS MODERATE 35: CPT | Performed by: SURGERY

## 2024-09-26 PROCEDURE — 71045 X-RAY EXAM CHEST 1 VIEW: CPT

## 2024-09-26 PROCEDURE — 2000000000 HC ICU R&B

## 2024-09-26 PROCEDURE — 72125 CT NECK SPINE W/O DYE: CPT

## 2024-09-26 PROCEDURE — 94003 VENT MGMT INPAT SUBQ DAY: CPT

## 2024-09-26 PROCEDURE — 82947 ASSAY GLUCOSE BLOOD QUANT: CPT

## 2024-09-26 PROCEDURE — 37799 UNLISTED PX VASCULAR SURGERY: CPT

## 2024-09-26 PROCEDURE — 2580000003 HC RX 258

## 2024-09-26 PROCEDURE — 94761 N-INVAS EAR/PLS OXIMETRY MLT: CPT

## 2024-09-26 PROCEDURE — 6370000000 HC RX 637 (ALT 250 FOR IP): Performed by: PHYSICIAN ASSISTANT

## 2024-09-26 PROCEDURE — 6360000002 HC RX W HCPCS

## 2024-09-26 PROCEDURE — 6370000000 HC RX 637 (ALT 250 FOR IP)

## 2024-09-26 PROCEDURE — 6360000002 HC RX W HCPCS: Performed by: SURGERY

## 2024-09-26 PROCEDURE — 6360000002 HC RX W HCPCS: Performed by: PHYSICIAN ASSISTANT

## 2024-09-26 PROCEDURE — 2700000000 HC OXYGEN THERAPY PER DAY

## 2024-09-26 PROCEDURE — 2580000003 HC RX 258: Performed by: PHYSICIAN ASSISTANT

## 2024-09-26 PROCEDURE — 80048 BASIC METABOLIC PNL TOTAL CA: CPT

## 2024-09-26 PROCEDURE — 83605 ASSAY OF LACTIC ACID: CPT

## 2024-09-26 PROCEDURE — 2580000003 HC RX 258: Performed by: ANESTHESIOLOGY

## 2024-09-26 PROCEDURE — 85025 COMPLETE CBC W/AUTO DIFF WBC: CPT

## 2024-09-26 RX ORDER — FENTANYL CITRATE 50 UG/ML
50 INJECTION, SOLUTION INTRAMUSCULAR; INTRAVENOUS
Status: DISCONTINUED | OUTPATIENT
Start: 2024-09-26 | End: 2024-10-02 | Stop reason: HOSPADM

## 2024-09-26 RX ORDER — DEXMEDETOMIDINE HYDROCHLORIDE 4 UG/ML
.1-1.5 INJECTION, SOLUTION INTRAVENOUS CONTINUOUS
Status: DISCONTINUED | OUTPATIENT
Start: 2024-09-26 | End: 2024-09-27

## 2024-09-26 RX ORDER — GABAPENTIN 300 MG/1
600 CAPSULE ORAL 3 TIMES DAILY
Status: DISCONTINUED | OUTPATIENT
Start: 2024-09-26 | End: 2024-09-28

## 2024-09-26 RX ORDER — KETOROLAC TROMETHAMINE 15 MG/ML
15 INJECTION, SOLUTION INTRAMUSCULAR; INTRAVENOUS EVERY 6 HOURS
Status: COMPLETED | OUTPATIENT
Start: 2024-09-26 | End: 2024-09-29

## 2024-09-26 RX ORDER — NOREPINEPHRINE BITARTRATE 0.06 MG/ML
1-100 INJECTION, SOLUTION INTRAVENOUS CONTINUOUS
Status: DISCONTINUED | OUTPATIENT
Start: 2024-09-26 | End: 2024-10-01

## 2024-09-26 RX ORDER — MAGNESIUM SULFATE IN WATER 40 MG/ML
2000 INJECTION, SOLUTION INTRAVENOUS
Status: COMPLETED | OUTPATIENT
Start: 2024-09-26 | End: 2024-09-26

## 2024-09-26 RX ADMIN — SODIUM CHLORIDE, PRESERVATIVE FREE 10 ML: 5 INJECTION INTRAVENOUS at 20:33

## 2024-09-26 RX ADMIN — METHOCARBAMOL 750 MG: 750 TABLET ORAL at 08:37

## 2024-09-26 RX ADMIN — FAMOTIDINE 20 MG: 10 INJECTION, SOLUTION INTRAVENOUS at 08:37

## 2024-09-26 RX ADMIN — MAGNESIUM SULFATE HEPTAHYDRATE 2000 MG: 40 INJECTION, SOLUTION INTRAVENOUS at 09:27

## 2024-09-26 RX ADMIN — NOREPINEPHRINE BITARTRATE 5 MCG/MIN: 0.06 INJECTION, SOLUTION INTRAVENOUS at 07:12

## 2024-09-26 RX ADMIN — OXYCODONE HYDROCHLORIDE 10 MG: 5 TABLET ORAL at 21:18

## 2024-09-26 RX ADMIN — ACETAMINOPHEN 1000 MG: 500 TABLET ORAL at 20:32

## 2024-09-26 RX ADMIN — METHOCARBAMOL 750 MG: 750 TABLET ORAL at 17:34

## 2024-09-26 RX ADMIN — DEXMEDETOMIDINE HYDROCHLORIDE 0.2 MCG/KG/HR: 400 INJECTION, SOLUTION INTRAVENOUS at 09:03

## 2024-09-26 RX ADMIN — SODIUM CHLORIDE, PRESERVATIVE FREE 10 ML: 5 INJECTION INTRAVENOUS at 08:42

## 2024-09-26 RX ADMIN — FENTANYL CITRATE 25 MCG: 50 INJECTION, SOLUTION INTRAMUSCULAR; INTRAVENOUS at 04:12

## 2024-09-26 RX ADMIN — ACETAMINOPHEN 1000 MG: 500 TABLET ORAL at 05:48

## 2024-09-26 RX ADMIN — 0.12% CHLORHEXIDINE GLUCONATE 15 ML: 1.2 RINSE ORAL at 08:41

## 2024-09-26 RX ADMIN — ACETAMINOPHEN 1000 MG: 500 TABLET ORAL at 13:24

## 2024-09-26 RX ADMIN — FENTANYL CITRATE 25 MCG: 50 INJECTION, SOLUTION INTRAMUSCULAR; INTRAVENOUS at 00:47

## 2024-09-26 RX ADMIN — FAMOTIDINE 20 MG: 10 INJECTION, SOLUTION INTRAVENOUS at 20:33

## 2024-09-26 RX ADMIN — GABAPENTIN 600 MG: 300 CAPSULE ORAL at 13:24

## 2024-09-26 RX ADMIN — SODIUM PHOSPHATE, MONOBASIC, MONOHYDRATE AND SODIUM PHOSPHATE, DIBASIC, ANHYDROUS 20 MMOL: 142; 276 INJECTION, SOLUTION INTRAVENOUS at 13:26

## 2024-09-26 RX ADMIN — PROPOFOL 40 MCG/KG/MIN: 10 INJECTION, EMULSION INTRAVENOUS at 04:10

## 2024-09-26 RX ADMIN — METHOCARBAMOL 750 MG: 750 TABLET ORAL at 13:24

## 2024-09-26 RX ADMIN — SODIUM PHOSPHATE, MONOBASIC, MONOHYDRATE AND SODIUM PHOSPHATE, DIBASIC, ANHYDROUS 20 MMOL: 276; 142 INJECTION, SOLUTION INTRAVENOUS at 01:44

## 2024-09-26 RX ADMIN — GABAPENTIN 600 MG: 300 CAPSULE ORAL at 20:33

## 2024-09-26 RX ADMIN — GABAPENTIN 300 MG: 300 CAPSULE ORAL at 08:37

## 2024-09-26 RX ADMIN — Medication 2000 MG: at 13:24

## 2024-09-26 RX ADMIN — KETOROLAC TROMETHAMINE 15 MG: 15 INJECTION, SOLUTION INTRAMUSCULAR; INTRAVENOUS at 17:37

## 2024-09-26 RX ADMIN — POLYETHYLENE GLYCOL 3350 17 G: 17 POWDER, FOR SOLUTION ORAL at 08:37

## 2024-09-26 RX ADMIN — KETOROLAC TROMETHAMINE 15 MG: 15 INJECTION, SOLUTION INTRAMUSCULAR; INTRAVENOUS at 11:18

## 2024-09-26 RX ADMIN — OXYCODONE HYDROCHLORIDE 10 MG: 5 TABLET ORAL at 15:27

## 2024-09-26 RX ADMIN — METHOCARBAMOL 750 MG: 750 TABLET ORAL at 20:33

## 2024-09-26 RX ADMIN — Medication 2000 MG: at 03:03

## 2024-09-26 RX ADMIN — MAGNESIUM SULFATE HEPTAHYDRATE 2000 MG: 40 INJECTION, SOLUTION INTRAVENOUS at 11:23

## 2024-09-26 ASSESSMENT — PAIN SCALES - GENERAL
PAINLEVEL_OUTOF10: 9
PAINLEVEL_OUTOF10: 9
PAINLEVEL_OUTOF10: 8
PAINLEVEL_OUTOF10: 3
PAINLEVEL_OUTOF10: 3
PAINLEVEL_OUTOF10: 7
PAINLEVEL_OUTOF10: 3
PAINLEVEL_OUTOF10: 8

## 2024-09-26 ASSESSMENT — PULMONARY FUNCTION TESTS
PIF_VALUE: 22
PIF_VALUE: 14
PIF_VALUE: 21
PIF_VALUE: 14
PIF_VALUE: 14
PIF_VALUE: 13
PIF_VALUE: 20
PIF_VALUE: 13
PIF_VALUE: 21
PIF_VALUE: 20
PIF_VALUE: 19
PIF_VALUE: 13
PIF_VALUE: 13
PIF_VALUE: 21
PIF_VALUE: 22
PIF_VALUE: 10
PIF_VALUE: 22
PIF_VALUE: 13
PIF_VALUE: 13
PIF_VALUE: 11

## 2024-09-26 NOTE — CARE COORDINATION
Post Acute Facility/Agency List     Provided  Sister per pt's request.   with the following list, the list includes the overall star ratings obtained from CMS per the Medicare Web site (www.Medicare.gov):     [] Long Term Acute Care Facilities  [x] Acute Inpatient Rehabilitation Facilities  [x] Skilled Nursing Facilities  [] Home Care  [x] Patient's Insurance specific coverage list for SNF    Provided verbal instructions on how to utilize the QR Code to obtain additional detailed star ratings from www.Medicare.gov     offered to print and provide the detailed list:    [x]Accepted   []Declined    Requested PM&R from Dr Lee- agreeable.

## 2024-09-26 NOTE — SIGNIFICANT EVENT
CRNA called to bedside to evaluate significant/worsened air leak from ETT. Dr Peterson called to bedside to discuss ETT exchange. Glidescope placed into mouth to find ETT cuff hernating up and out of glottic opening. Cuff deflated, ETT advanced to 26 at gums. Bilat equal breath sounds noted. Air leak resolved. Ok per Dr. Peterson for CXR to evaluate placement. Pox maintained at 100% for the entire event.

## 2024-09-27 ENCOUNTER — APPOINTMENT (OUTPATIENT)
Dept: GENERAL RADIOLOGY | Age: 54
DRG: 912 | End: 2024-09-27
Payer: COMMERCIAL

## 2024-09-27 LAB
ANION GAP SERPL CALCULATED.3IONS-SCNC: 7 MMOL/L (ref 9–16)
BASOPHILS # BLD: 0.03 K/UL (ref 0–0.2)
BASOPHILS NFR BLD: 0 % (ref 0–2)
BUN SERPL-MCNC: 13 MG/DL (ref 6–20)
CA-I BLD-SCNC: 1.09 MMOL/L (ref 1.13–1.33)
CALCIUM SERPL-MCNC: 7.5 MG/DL (ref 8.6–10.4)
CHLORIDE SERPL-SCNC: 108 MMOL/L (ref 98–107)
CO2 SERPL-SCNC: 24 MMOL/L (ref 20–31)
CREAT SERPL-MCNC: 0.8 MG/DL (ref 0.7–1.2)
EOSINOPHIL # BLD: 0.04 K/UL (ref 0–0.44)
EOSINOPHILS RELATIVE PERCENT: 0 % (ref 1–4)
ERYTHROCYTE [DISTWIDTH] IN BLOOD BY AUTOMATED COUNT: 13.5 % (ref 11.8–14.4)
GFR, ESTIMATED: >90 ML/MIN/1.73M2
GLUCOSE BLD-MCNC: 131 MG/DL (ref 75–110)
GLUCOSE SERPL-MCNC: 122 MG/DL (ref 74–99)
HCT VFR BLD AUTO: 37.5 % (ref 40.7–50.3)
HGB BLD-MCNC: 12.5 G/DL (ref 13–17)
IMM GRANULOCYTES # BLD AUTO: <0.03 K/UL (ref 0–0.3)
IMM GRANULOCYTES NFR BLD: 0 %
LYMPHOCYTES NFR BLD: 2.6 K/UL (ref 1.1–3.7)
LYMPHOCYTES RELATIVE PERCENT: 28 % (ref 24–43)
MAGNESIUM SERPL-MCNC: 2 MG/DL (ref 1.6–2.6)
MCH RBC QN AUTO: 32.3 PG (ref 25.2–33.5)
MCHC RBC AUTO-ENTMCNC: 33.3 G/DL (ref 28.4–34.8)
MCV RBC AUTO: 96.9 FL (ref 82.6–102.9)
MONOCYTES NFR BLD: 1.01 K/UL (ref 0.1–1.2)
MONOCYTES NFR BLD: 11 % (ref 3–12)
NEUTROPHILS NFR BLD: 61 % (ref 36–65)
NEUTS SEG NFR BLD: 5.55 K/UL (ref 1.5–8.1)
NRBC BLD-RTO: 0 PER 100 WBC
PHOSPHATE SERPL-MCNC: 2.9 MG/DL (ref 2.5–4.5)
PLATELET # BLD AUTO: 225 K/UL (ref 138–453)
PMV BLD AUTO: 9.4 FL (ref 8.1–13.5)
POTASSIUM SERPL-SCNC: 4.4 MMOL/L (ref 3.7–5.3)
RBC # BLD AUTO: 3.87 M/UL (ref 4.21–5.77)
SODIUM SERPL-SCNC: 139 MMOL/L (ref 136–145)
WBC OTHER # BLD: 9.2 K/UL (ref 3.5–11.3)

## 2024-09-27 PROCEDURE — 97110 THERAPEUTIC EXERCISES: CPT

## 2024-09-27 PROCEDURE — 97530 THERAPEUTIC ACTIVITIES: CPT

## 2024-09-27 PROCEDURE — 82947 ASSAY GLUCOSE BLOOD QUANT: CPT

## 2024-09-27 PROCEDURE — 2500000003 HC RX 250 WO HCPCS

## 2024-09-27 PROCEDURE — 97163 PT EVAL HIGH COMPLEX 45 MIN: CPT

## 2024-09-27 PROCEDURE — 83735 ASSAY OF MAGNESIUM: CPT

## 2024-09-27 PROCEDURE — 2580000003 HC RX 258: Performed by: PHYSICIAN ASSISTANT

## 2024-09-27 PROCEDURE — 85025 COMPLETE CBC W/AUTO DIFF WBC: CPT

## 2024-09-27 PROCEDURE — 94618 PULMONARY STRESS TESTING: CPT

## 2024-09-27 PROCEDURE — 6370000000 HC RX 637 (ALT 250 FOR IP)

## 2024-09-27 PROCEDURE — 2000000000 HC ICU R&B

## 2024-09-27 PROCEDURE — 6360000002 HC RX W HCPCS

## 2024-09-27 PROCEDURE — 2580000003 HC RX 258

## 2024-09-27 PROCEDURE — 99232 SBSQ HOSP IP/OBS MODERATE 35: CPT | Performed by: SURGERY

## 2024-09-27 PROCEDURE — 2700000000 HC OXYGEN THERAPY PER DAY

## 2024-09-27 PROCEDURE — 82330 ASSAY OF CALCIUM: CPT

## 2024-09-27 PROCEDURE — 6370000000 HC RX 637 (ALT 250 FOR IP): Performed by: PHYSICIAN ASSISTANT

## 2024-09-27 PROCEDURE — 97535 SELF CARE MNGMENT TRAINING: CPT

## 2024-09-27 PROCEDURE — 36415 COLL VENOUS BLD VENIPUNCTURE: CPT

## 2024-09-27 PROCEDURE — 80048 BASIC METABOLIC PNL TOTAL CA: CPT

## 2024-09-27 PROCEDURE — 6360000002 HC RX W HCPCS: Performed by: PHYSICIAN ASSISTANT

## 2024-09-27 PROCEDURE — 2580000003 HC RX 258: Performed by: ANESTHESIOLOGY

## 2024-09-27 PROCEDURE — 94761 N-INVAS EAR/PLS OXIMETRY MLT: CPT

## 2024-09-27 PROCEDURE — 71045 X-RAY EXAM CHEST 1 VIEW: CPT

## 2024-09-27 PROCEDURE — 37799 UNLISTED PX VASCULAR SURGERY: CPT

## 2024-09-27 PROCEDURE — 84100 ASSAY OF PHOSPHORUS: CPT

## 2024-09-27 PROCEDURE — 72040 X-RAY EXAM NECK SPINE 2-3 VW: CPT

## 2024-09-27 PROCEDURE — 99254 IP/OBS CNSLTJ NEW/EST MOD 60: CPT | Performed by: PHYSICAL MEDICINE & REHABILITATION

## 2024-09-27 PROCEDURE — 97167 OT EVAL HIGH COMPLEX 60 MIN: CPT

## 2024-09-27 RX ORDER — SODIUM CHLORIDE, SODIUM LACTATE, POTASSIUM CHLORIDE, CALCIUM CHLORIDE 600; 310; 30; 20 MG/100ML; MG/100ML; MG/100ML; MG/100ML
INJECTION, SOLUTION INTRAVENOUS CONTINUOUS
Status: DISCONTINUED | OUTPATIENT
Start: 2024-09-27 | End: 2024-10-01

## 2024-09-27 RX ORDER — CALCIUM GLUCONATE 20 MG/ML
2000 INJECTION, SOLUTION INTRAVENOUS ONCE
Status: COMPLETED | OUTPATIENT
Start: 2024-09-27 | End: 2024-09-27

## 2024-09-27 RX ADMIN — ACETAMINOPHEN 1000 MG: 500 TABLET ORAL at 13:48

## 2024-09-27 RX ADMIN — ACETAMINOPHEN 1000 MG: 500 TABLET ORAL at 06:14

## 2024-09-27 RX ADMIN — SODIUM CHLORIDE, PRESERVATIVE FREE 10 ML: 5 INJECTION INTRAVENOUS at 20:41

## 2024-09-27 RX ADMIN — GABAPENTIN 600 MG: 300 CAPSULE ORAL at 20:27

## 2024-09-27 RX ADMIN — POLYETHYLENE GLYCOL 3350 17 G: 17 POWDER, FOR SOLUTION ORAL at 08:22

## 2024-09-27 RX ADMIN — SODIUM CHLORIDE, POTASSIUM CHLORIDE, SODIUM LACTATE AND CALCIUM CHLORIDE: 600; 310; 30; 20 INJECTION, SOLUTION INTRAVENOUS at 08:55

## 2024-09-27 RX ADMIN — CALCIUM GLUCONATE 2000 MG: 20 INJECTION, SOLUTION INTRAVENOUS at 08:44

## 2024-09-27 RX ADMIN — FENTANYL CITRATE 50 MCG: 50 INJECTION, SOLUTION INTRAMUSCULAR; INTRAVENOUS at 15:16

## 2024-09-27 RX ADMIN — FAMOTIDINE 20 MG: 10 INJECTION, SOLUTION INTRAVENOUS at 20:27

## 2024-09-27 RX ADMIN — OXYCODONE HYDROCHLORIDE 10 MG: 5 TABLET ORAL at 02:09

## 2024-09-27 RX ADMIN — METHOCARBAMOL 750 MG: 750 TABLET ORAL at 11:53

## 2024-09-27 RX ADMIN — METHOCARBAMOL 750 MG: 750 TABLET ORAL at 08:09

## 2024-09-27 RX ADMIN — OXYCODONE HYDROCHLORIDE 10 MG: 5 TABLET ORAL at 19:29

## 2024-09-27 RX ADMIN — KETOROLAC TROMETHAMINE 15 MG: 15 INJECTION, SOLUTION INTRAMUSCULAR; INTRAVENOUS at 00:24

## 2024-09-27 RX ADMIN — OXYCODONE HYDROCHLORIDE 10 MG: 5 TABLET ORAL at 11:52

## 2024-09-27 RX ADMIN — ACETAMINOPHEN 1000 MG: 500 TABLET ORAL at 20:27

## 2024-09-27 RX ADMIN — GABAPENTIN 600 MG: 300 CAPSULE ORAL at 08:09

## 2024-09-27 RX ADMIN — KETOROLAC TROMETHAMINE 15 MG: 15 INJECTION, SOLUTION INTRAMUSCULAR; INTRAVENOUS at 06:14

## 2024-09-27 RX ADMIN — KETOROLAC TROMETHAMINE 15 MG: 15 INJECTION, SOLUTION INTRAMUSCULAR; INTRAVENOUS at 23:14

## 2024-09-27 RX ADMIN — FENTANYL CITRATE 50 MCG: 50 INJECTION, SOLUTION INTRAMUSCULAR; INTRAVENOUS at 08:11

## 2024-09-27 RX ADMIN — ENOXAPARIN SODIUM 40 MG: 100 INJECTION SUBCUTANEOUS at 08:10

## 2024-09-27 RX ADMIN — OXYCODONE HYDROCHLORIDE 10 MG: 5 TABLET ORAL at 06:17

## 2024-09-27 RX ADMIN — KETOROLAC TROMETHAMINE 15 MG: 15 INJECTION, SOLUTION INTRAMUSCULAR; INTRAVENOUS at 11:22

## 2024-09-27 RX ADMIN — FAMOTIDINE 20 MG: 10 INJECTION, SOLUTION INTRAVENOUS at 08:08

## 2024-09-27 RX ADMIN — KETOROLAC TROMETHAMINE 15 MG: 15 INJECTION, SOLUTION INTRAMUSCULAR; INTRAVENOUS at 17:16

## 2024-09-27 RX ADMIN — SODIUM CHLORIDE, PRESERVATIVE FREE 10 ML: 5 INJECTION INTRAVENOUS at 20:40

## 2024-09-27 RX ADMIN — FENTANYL CITRATE 50 MCG: 50 INJECTION, SOLUTION INTRAMUSCULAR; INTRAVENOUS at 02:58

## 2024-09-27 RX ADMIN — METHOCARBAMOL 750 MG: 750 TABLET ORAL at 20:27

## 2024-09-27 RX ADMIN — METHOCARBAMOL 750 MG: 750 TABLET ORAL at 17:16

## 2024-09-27 RX ADMIN — GABAPENTIN 600 MG: 300 CAPSULE ORAL at 13:48

## 2024-09-27 RX ADMIN — 0.12% CHLORHEXIDINE GLUCONATE 15 ML: 1.2 RINSE ORAL at 20:28

## 2024-09-27 RX ADMIN — FENTANYL CITRATE 50 MCG: 50 INJECTION, SOLUTION INTRAMUSCULAR; INTRAVENOUS at 20:29

## 2024-09-27 ASSESSMENT — PAIN DESCRIPTION - LOCATION
LOCATION: SHOULDER
LOCATION: NECK;SHOULDER;WRIST
LOCATION: WRIST
LOCATION: HAND;FOOT
LOCATION: NECK;SHOULDER;WRIST
LOCATION: ARM
LOCATION: ARM;LEG

## 2024-09-27 ASSESSMENT — PAIN DESCRIPTION - ORIENTATION
ORIENTATION: LEFT;RIGHT
ORIENTATION: RIGHT;LEFT
ORIENTATION: RIGHT
ORIENTATION: RIGHT;LEFT

## 2024-09-27 ASSESSMENT — PAIN SCALES - GENERAL
PAINLEVEL_OUTOF10: 8
PAINLEVEL_OUTOF10: 8
PAINLEVEL_OUTOF10: 7
PAINLEVEL_OUTOF10: 8
PAINLEVEL_OUTOF10: 6
PAINLEVEL_OUTOF10: 7
PAINLEVEL_OUTOF10: 7
PAINLEVEL_OUTOF10: 8
PAINLEVEL_OUTOF10: 7
PAINLEVEL_OUTOF10: 7
PAINLEVEL_OUTOF10: 6

## 2024-09-27 NOTE — CARE COORDINATION
Trauma Coordinator Rounding Note  Daily check in:  I met with Lauro Munroe  at bedside to review their plan of care. He was sleeping but wakes to verbal stimuli. I allowed opportunity for Lauro Munroe to ask questions regarding their injuries, expected length of stay and disposition plan. All questions answered to verbal satisfaction.   [x]Wounds  [x]PT/OT/speech  [x]Incentive spirometer (IS 1500)  [x]Diet  [x]Activity  [x]Preferred pharmacy  []TRC consulted  DC Expectation:  Patient expects to be discharged to  [facility] at this time.   Bedside Nurse:  I spoke with the patient's assigned nurse to review the plan of care for today and provide an opportunity to ask questions or relay any concerns to the Trauma service.    Discharge Info:  I confirmed follow up information was placed in the discharge instructions for  unknown at this time .   :  I provided a clinical update to the unit  and confirmed the disposition plan. Current barriers to discharge include: not medically stable for discharge due to ICU status.   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 No workmen's comp involved.   Electronically signed by Landy Garcia RN on 9/27/2024 at 3:35 PM

## 2024-09-27 NOTE — PROCEDURES
PROCEDURE NOTE  Date: 9/27/2024   Name: Lauro Munroe  YOB: 1970      Drain Removal Note    [x] Cervical ADRIA drain      Drain removed from suction, prepped with chlorhexidine and sterile towels placed to create sterile field. Drain suture cut and drain removed. A pressure dressing was applied with hemostasis.     No complications, patient tolerated procedure well.    --  Aaliyah Medina, SANDRA - CNP  12:39 PM EDT

## 2024-09-27 NOTE — CONSULTS
Component Value Date/Time     09/24/2024 10:39 AM    K 4.2 09/24/2024 10:39 AM     09/24/2024 10:39 AM    CO2 23 09/24/2024 10:39 AM    BUN 12 09/24/2024 10:39 AM    CREATININE 0.8 09/24/2024 10:39 AM    LABGLOM 60 09/24/2024 10:39 AM    GLUCOSE 104 09/24/2024 10:39 AM       Radiology Review:    CT head and neck:  Impression   1. No convincing acute intracranial abnormality.  2. No gross evidence of acute fracture or traumatic malalignment of the  cervical spine.  3. There is multilevel degenerative change of the cervical spine with  suggestion of at least moderate spinal canal stenosis at C3-C4 and C4-C5.  Recommend clinical correlation for possible central cord syndrome.  4. No flow limiting stenosis or acute injury of the cervical  carotid/vertebral arteries.  5. No significant stenosis of the ivzbmu-ep-[id number].  6. Moderate size left pneumothorax.         ASSESSMENT AND PLAN:       Patient Active Problem List   Diagnosis    Central cord syndrome, subsequent encounter ([facility])         A/P:  This is a 50 y.o. male with left-sided weakness and dysesthesias after a fall from 10 feet possibly secondary to chronic cervical stenosis and central cord syndrome    Patient care will be discussed with attending, will reevaluated patient along with attending.      - Neurosurgical intervention pending imaging findings and review by attending neurosurgeon  - Maintain c-collar  - NPO   - Obtain MRI, plan to be updated once completed and read by attending   - Neuro checks per protocol  - Hold all antiplatelets and anticoagulants  - We recommend SBP < 140   - Determine the lower limit of SBP clinically based on mentation      Additional recommendations may follow    Please contact neurosurgery with any changes in patients neurologic status.     Thank you for your consult.       DO HONG Gutierrez pager [phone]  9/24/2024  11:17 AM      
joints  Neurological: negative for dizziness, headaches and weakness  Behavioral/Psych: negative for decreased appetite, depression and fatigue    Functional History:  PTA: Independent with all activities.    Current:  PT:                                 OT:   ADL  Skin Care: Chlorhexidine solution, Soap and water         ST:      Past Medical History:    History reviewed. No pertinent past medical history.    Past Surgical History:        Procedure Laterality Date    CERVICAL FUSION N/A 9/25/2024    C3-C5 LAMINECTOMY, C6-C7 LAMINOTOMY, C3-C5 FUSION performed by Linda Mckeon DO at [facility] OR    CERVICAL LAMINECTOMY  09/25/2024    C3-C6 POSS C7 LAMINECTOMY, C3-C6 FUSION POSS TO T1       Allergies:    No Known Allergies     Current Medications:   Current Facility-Administered Medications: lactated ringers IV soln infusion, , IntraVENous, Continuous  calcium gluconate 2,000 mg in sodium chloride 100 mL, 2,000 mg, IntraVENous, Once  norepinephrine (LEVOPHED) 16 mg in sodium chloride 0.9 % 250 mL infusion, 1-100 mcg/min, IntraVENous, Continuous  gabapentin (NEURONTIN) capsule 600 mg, 600 mg, Oral, TID  ketorolac (TORADOL) injection 15 mg, 15 mg, IntraVENous, Q6H  fentaNYL (SUBLIMAZE) injection 50 mcg, 50 mcg, IntraVENous, Q2H PRN  sodium chloride flush 0.9 % injection 5-40 mL, 5-40 mL, IntraVENous, 2 times per day  sodium chloride flush 0.9 % injection 5-40 mL, 5-40 mL, IntraVENous, PRN  0.9 % sodium chloride infusion, , IntraVENous, PRN  midazolam PF (VERSED) injection 1 mg, 1 mg, IntraVENous, Q10 Min PRN  naloxone 0.4 mg in 10 mL sodium chloride syringe, , IntraVENous, PRN  sodium chloride flush 0.9 % injection 5-40 mL, 5-40 mL, IntraVENous, 2 times per day  sodium chloride flush 0.9 % injection 5-40 mL, 5-40 mL, IntraVENous, PRN  0.9 % sodium chloride infusion, , IntraVENous, PRN  sodium chloride flush 0.9 % injection 5-40 mL, 5-40 mL, IntraVENous, 2 times per day  sodium chloride flush 0.9 % injection 5-40 mL, 5-40

## 2024-09-27 NOTE — CARE COORDINATION
Transition Planning    Pt does not know what choices for next LOC that his sister made for him. Call to pt's sister. Left HIPAA compliant voice mail requesting call back.

## 2024-09-28 LAB
ANION GAP SERPL CALCULATED.3IONS-SCNC: 6 MMOL/L (ref 9–16)
BASOPHILS # BLD: <0.03 K/UL (ref 0–0.2)
BASOPHILS NFR BLD: 0 % (ref 0–2)
BUN SERPL-MCNC: 9 MG/DL (ref 6–20)
CA-I BLD-SCNC: 1.07 MMOL/L (ref 1.13–1.33)
CALCIUM SERPL-MCNC: 7.7 MG/DL (ref 8.6–10.4)
CHLORIDE SERPL-SCNC: 105 MMOL/L (ref 98–107)
CO2 SERPL-SCNC: 26 MMOL/L (ref 20–31)
CREAT SERPL-MCNC: 0.7 MG/DL (ref 0.7–1.2)
EOSINOPHIL # BLD: 0.14 K/UL (ref 0–0.44)
EOSINOPHILS RELATIVE PERCENT: 2 % (ref 1–4)
ERYTHROCYTE [DISTWIDTH] IN BLOOD BY AUTOMATED COUNT: 13.3 % (ref 11.8–14.4)
GFR, ESTIMATED: >90 ML/MIN/1.73M2
GLUCOSE SERPL-MCNC: 118 MG/DL (ref 74–99)
HCT VFR BLD AUTO: 37 % (ref 40.7–50.3)
HGB BLD-MCNC: 12.4 G/DL (ref 13–17)
IMM GRANULOCYTES # BLD AUTO: <0.03 K/UL (ref 0–0.3)
IMM GRANULOCYTES NFR BLD: 0 %
LYMPHOCYTES NFR BLD: 2.29 K/UL (ref 1.1–3.7)
LYMPHOCYTES RELATIVE PERCENT: 32 % (ref 24–43)
MAGNESIUM SERPL-MCNC: 1.6 MG/DL (ref 1.6–2.6)
MCH RBC QN AUTO: 32.5 PG (ref 25.2–33.5)
MCHC RBC AUTO-ENTMCNC: 33.5 G/DL (ref 28.4–34.8)
MCV RBC AUTO: 96.9 FL (ref 82.6–102.9)
MONOCYTES NFR BLD: 0.86 K/UL (ref 0.1–1.2)
MONOCYTES NFR BLD: 12 % (ref 3–12)
NEUTROPHILS NFR BLD: 54 % (ref 36–65)
NEUTS SEG NFR BLD: 3.79 K/UL (ref 1.5–8.1)
NRBC BLD-RTO: 0 PER 100 WBC
PHOSPHATE SERPL-MCNC: 3 MG/DL (ref 2.5–4.5)
PLATELET # BLD AUTO: 213 K/UL (ref 138–453)
PMV BLD AUTO: 9.7 FL (ref 8.1–13.5)
POTASSIUM SERPL-SCNC: 4.5 MMOL/L (ref 3.7–5.3)
RBC # BLD AUTO: 3.82 M/UL (ref 4.21–5.77)
SODIUM SERPL-SCNC: 137 MMOL/L (ref 136–145)
WBC OTHER # BLD: 7.1 K/UL (ref 3.5–11.3)

## 2024-09-28 PROCEDURE — 97110 THERAPEUTIC EXERCISES: CPT

## 2024-09-28 PROCEDURE — 6360000002 HC RX W HCPCS: Performed by: PHYSICIAN ASSISTANT

## 2024-09-28 PROCEDURE — 97530 THERAPEUTIC ACTIVITIES: CPT

## 2024-09-28 PROCEDURE — 94761 N-INVAS EAR/PLS OXIMETRY MLT: CPT

## 2024-09-28 PROCEDURE — 2580000003 HC RX 258

## 2024-09-28 PROCEDURE — 97112 NEUROMUSCULAR REEDUCATION: CPT

## 2024-09-28 PROCEDURE — 6370000000 HC RX 637 (ALT 250 FOR IP)

## 2024-09-28 PROCEDURE — 2580000003 HC RX 258: Performed by: ANESTHESIOLOGY

## 2024-09-28 PROCEDURE — 83735 ASSAY OF MAGNESIUM: CPT

## 2024-09-28 PROCEDURE — 2500000003 HC RX 250 WO HCPCS

## 2024-09-28 PROCEDURE — 80048 BASIC METABOLIC PNL TOTAL CA: CPT

## 2024-09-28 PROCEDURE — 85025 COMPLETE CBC W/AUTO DIFF WBC: CPT

## 2024-09-28 PROCEDURE — 82330 ASSAY OF CALCIUM: CPT

## 2024-09-28 PROCEDURE — 6370000000 HC RX 637 (ALT 250 FOR IP): Performed by: PHYSICIAN ASSISTANT

## 2024-09-28 PROCEDURE — 99232 SBSQ HOSP IP/OBS MODERATE 35: CPT | Performed by: SURGERY

## 2024-09-28 PROCEDURE — 6360000002 HC RX W HCPCS

## 2024-09-28 PROCEDURE — 84100 ASSAY OF PHOSPHORUS: CPT

## 2024-09-28 PROCEDURE — 2000000000 HC ICU R&B

## 2024-09-28 RX ORDER — CALCIUM GLUCONATE 20 MG/ML
2000 INJECTION, SOLUTION INTRAVENOUS ONCE
Status: COMPLETED | OUTPATIENT
Start: 2024-09-28 | End: 2024-09-28

## 2024-09-28 RX ORDER — MIDODRINE HYDROCHLORIDE 5 MG/1
10 TABLET ORAL
Status: DISCONTINUED | OUTPATIENT
Start: 2024-09-28 | End: 2024-10-01

## 2024-09-28 RX ORDER — GABAPENTIN 300 MG/1
900 CAPSULE ORAL 3 TIMES DAILY
Status: DISCONTINUED | OUTPATIENT
Start: 2024-09-28 | End: 2024-10-02 | Stop reason: HOSPADM

## 2024-09-28 RX ORDER — MAGNESIUM SULFATE IN WATER 40 MG/ML
2000 INJECTION, SOLUTION INTRAVENOUS ONCE
Status: COMPLETED | OUTPATIENT
Start: 2024-09-28 | End: 2024-09-28

## 2024-09-28 RX ADMIN — MAGNESIUM SULFATE HEPTAHYDRATE 2000 MG: 40 INJECTION, SOLUTION INTRAVENOUS at 07:21

## 2024-09-28 RX ADMIN — METHOCARBAMOL 750 MG: 750 TABLET ORAL at 15:37

## 2024-09-28 RX ADMIN — ENOXAPARIN SODIUM 40 MG: 100 INJECTION SUBCUTANEOUS at 07:24

## 2024-09-28 RX ADMIN — OXYCODONE 5 MG: 5 TABLET ORAL at 06:04

## 2024-09-28 RX ADMIN — MIDODRINE HYDROCHLORIDE 10 MG: 5 TABLET ORAL at 12:05

## 2024-09-28 RX ADMIN — MIDODRINE HYDROCHLORIDE 10 MG: 5 TABLET ORAL at 09:16

## 2024-09-28 RX ADMIN — GABAPENTIN 900 MG: 300 CAPSULE ORAL at 20:00

## 2024-09-28 RX ADMIN — CALCIUM GLUCONATE 2000 MG: 20 INJECTION, SOLUTION INTRAVENOUS at 07:21

## 2024-09-28 RX ADMIN — GABAPENTIN 900 MG: 300 CAPSULE ORAL at 13:07

## 2024-09-28 RX ADMIN — GABAPENTIN 600 MG: 300 CAPSULE ORAL at 07:24

## 2024-09-28 RX ADMIN — 0.12% CHLORHEXIDINE GLUCONATE 15 ML: 1.2 RINSE ORAL at 20:07

## 2024-09-28 RX ADMIN — SODIUM CHLORIDE, POTASSIUM CHLORIDE, SODIUM LACTATE AND CALCIUM CHLORIDE: 600; 310; 30; 20 INJECTION, SOLUTION INTRAVENOUS at 06:16

## 2024-09-28 RX ADMIN — ACETAMINOPHEN 1000 MG: 500 TABLET ORAL at 04:12

## 2024-09-28 RX ADMIN — ACETAMINOPHEN 1000 MG: 500 TABLET ORAL at 13:07

## 2024-09-28 RX ADMIN — MIDODRINE HYDROCHLORIDE 10 MG: 5 TABLET ORAL at 15:37

## 2024-09-28 RX ADMIN — OXYCODONE HYDROCHLORIDE 10 MG: 5 TABLET ORAL at 00:44

## 2024-09-28 RX ADMIN — ACETAMINOPHEN 1000 MG: 500 TABLET ORAL at 23:40

## 2024-09-28 RX ADMIN — KETOROLAC TROMETHAMINE 15 MG: 15 INJECTION, SOLUTION INTRAMUSCULAR; INTRAVENOUS at 11:12

## 2024-09-28 RX ADMIN — SODIUM CHLORIDE, POTASSIUM CHLORIDE, SODIUM LACTATE AND CALCIUM CHLORIDE: 600; 310; 30; 20 INJECTION, SOLUTION INTRAVENOUS at 23:50

## 2024-09-28 RX ADMIN — KETOROLAC TROMETHAMINE 15 MG: 15 INJECTION, SOLUTION INTRAMUSCULAR; INTRAVENOUS at 15:36

## 2024-09-28 RX ADMIN — OXYCODONE HYDROCHLORIDE 10 MG: 5 TABLET ORAL at 13:49

## 2024-09-28 RX ADMIN — FAMOTIDINE 20 MG: 10 INJECTION, SOLUTION INTRAVENOUS at 07:25

## 2024-09-28 RX ADMIN — OXYCODONE HYDROCHLORIDE 10 MG: 5 TABLET ORAL at 23:46

## 2024-09-28 RX ADMIN — FAMOTIDINE 20 MG: 10 INJECTION, SOLUTION INTRAVENOUS at 20:07

## 2024-09-28 RX ADMIN — METHOCARBAMOL 750 MG: 750 TABLET ORAL at 19:59

## 2024-09-28 RX ADMIN — KETOROLAC TROMETHAMINE 15 MG: 15 INJECTION, SOLUTION INTRAMUSCULAR; INTRAVENOUS at 04:12

## 2024-09-28 RX ADMIN — OXYCODONE HYDROCHLORIDE 10 MG: 5 TABLET ORAL at 20:00

## 2024-09-28 RX ADMIN — METHOCARBAMOL 750 MG: 750 TABLET ORAL at 07:24

## 2024-09-28 RX ADMIN — METHOCARBAMOL 750 MG: 750 TABLET ORAL at 13:07

## 2024-09-28 RX ADMIN — SODIUM CHLORIDE, PRESERVATIVE FREE 10 ML: 5 INJECTION INTRAVENOUS at 20:07

## 2024-09-28 RX ADMIN — SENNOSIDES 8.6 MG: 8.6 TABLET, COATED ORAL at 16:39

## 2024-09-28 RX ADMIN — KETOROLAC TROMETHAMINE 15 MG: 15 INJECTION, SOLUTION INTRAMUSCULAR; INTRAVENOUS at 23:40

## 2024-09-28 ASSESSMENT — PAIN SCALES - GENERAL
PAINLEVEL_OUTOF10: 9
PAINLEVEL_OUTOF10: 7
PAINLEVEL_OUTOF10: 6
PAINLEVEL_OUTOF10: 6
PAINLEVEL_OUTOF10: 7
PAINLEVEL_OUTOF10: 6
PAINLEVEL_OUTOF10: 6
PAINLEVEL_OUTOF10: 0
PAINLEVEL_OUTOF10: 7
PAINLEVEL_OUTOF10: 5
PAINLEVEL_OUTOF10: 9

## 2024-09-28 ASSESSMENT — PAIN DESCRIPTION - LOCATION
LOCATION: SHOULDER
LOCATION: NECK
LOCATION: NECK
LOCATION: SHOULDER

## 2024-09-29 LAB
ANION GAP SERPL CALCULATED.3IONS-SCNC: 5 MMOL/L (ref 9–16)
BASOPHILS # BLD: <0.03 K/UL (ref 0–0.2)
BASOPHILS NFR BLD: 0 % (ref 0–2)
BUN SERPL-MCNC: 13 MG/DL (ref 6–20)
CA-I BLD-SCNC: 1.13 MMOL/L (ref 1.13–1.33)
CALCIUM SERPL-MCNC: 7.9 MG/DL (ref 8.6–10.4)
CHLORIDE SERPL-SCNC: 107 MMOL/L (ref 98–107)
CO2 SERPL-SCNC: 25 MMOL/L (ref 20–31)
CREAT SERPL-MCNC: 0.8 MG/DL (ref 0.7–1.2)
EOSINOPHIL # BLD: 0.26 K/UL (ref 0–0.44)
EOSINOPHILS RELATIVE PERCENT: 4 % (ref 1–4)
ERYTHROCYTE [DISTWIDTH] IN BLOOD BY AUTOMATED COUNT: 13 % (ref 11.8–14.4)
GFR, ESTIMATED: >90 ML/MIN/1.73M2
GLUCOSE SERPL-MCNC: 113 MG/DL (ref 74–99)
HCT VFR BLD AUTO: 37.6 % (ref 40.7–50.3)
HGB BLD-MCNC: 12.8 G/DL (ref 13–17)
IMM GRANULOCYTES # BLD AUTO: <0.03 K/UL (ref 0–0.3)
IMM GRANULOCYTES NFR BLD: 0 %
LYMPHOCYTES NFR BLD: 1.99 K/UL (ref 1.1–3.7)
LYMPHOCYTES RELATIVE PERCENT: 29 % (ref 24–43)
MAGNESIUM SERPL-MCNC: 1.7 MG/DL (ref 1.6–2.6)
MCH RBC QN AUTO: 33 PG (ref 25.2–33.5)
MCHC RBC AUTO-ENTMCNC: 34 G/DL (ref 28.4–34.8)
MCV RBC AUTO: 96.9 FL (ref 82.6–102.9)
MONOCYTES NFR BLD: 0.78 K/UL (ref 0.1–1.2)
MONOCYTES NFR BLD: 12 % (ref 3–12)
NEUTROPHILS NFR BLD: 55 % (ref 36–65)
NEUTS SEG NFR BLD: 3.7 K/UL (ref 1.5–8.1)
NRBC BLD-RTO: 0 PER 100 WBC
PHOSPHATE SERPL-MCNC: 3 MG/DL (ref 2.5–4.5)
PLATELET # BLD AUTO: 237 K/UL (ref 138–453)
PMV BLD AUTO: 10 FL (ref 8.1–13.5)
POTASSIUM SERPL-SCNC: 4.4 MMOL/L (ref 3.7–5.3)
RBC # BLD AUTO: 3.88 M/UL (ref 4.21–5.77)
SODIUM SERPL-SCNC: 137 MMOL/L (ref 136–145)
WBC OTHER # BLD: 6.8 K/UL (ref 3.5–11.3)

## 2024-09-29 PROCEDURE — 80048 BASIC METABOLIC PNL TOTAL CA: CPT

## 2024-09-29 PROCEDURE — 6370000000 HC RX 637 (ALT 250 FOR IP)

## 2024-09-29 PROCEDURE — 6370000000 HC RX 637 (ALT 250 FOR IP): Performed by: PHYSICIAN ASSISTANT

## 2024-09-29 PROCEDURE — 2000000000 HC ICU R&B

## 2024-09-29 PROCEDURE — 2580000003 HC RX 258

## 2024-09-29 PROCEDURE — 94761 N-INVAS EAR/PLS OXIMETRY MLT: CPT

## 2024-09-29 PROCEDURE — 6360000002 HC RX W HCPCS

## 2024-09-29 PROCEDURE — 84100 ASSAY OF PHOSPHORUS: CPT

## 2024-09-29 PROCEDURE — 85025 COMPLETE CBC W/AUTO DIFF WBC: CPT

## 2024-09-29 PROCEDURE — 99232 SBSQ HOSP IP/OBS MODERATE 35: CPT | Performed by: SURGERY

## 2024-09-29 PROCEDURE — 83735 ASSAY OF MAGNESIUM: CPT

## 2024-09-29 PROCEDURE — 2500000003 HC RX 250 WO HCPCS

## 2024-09-29 PROCEDURE — 82330 ASSAY OF CALCIUM: CPT

## 2024-09-29 PROCEDURE — 6360000002 HC RX W HCPCS: Performed by: PHYSICIAN ASSISTANT

## 2024-09-29 RX ORDER — MAGNESIUM SULFATE IN WATER 40 MG/ML
2000 INJECTION, SOLUTION INTRAVENOUS ONCE
Status: COMPLETED | OUTPATIENT
Start: 2024-09-29 | End: 2024-09-29

## 2024-09-29 RX ADMIN — MAGNESIUM SULFATE HEPTAHYDRATE 2000 MG: 40 INJECTION, SOLUTION INTRAVENOUS at 11:02

## 2024-09-29 RX ADMIN — FENTANYL CITRATE 50 MCG: 50 INJECTION, SOLUTION INTRAMUSCULAR; INTRAVENOUS at 05:18

## 2024-09-29 RX ADMIN — GABAPENTIN 900 MG: 300 CAPSULE ORAL at 21:36

## 2024-09-29 RX ADMIN — ACETAMINOPHEN 1000 MG: 500 TABLET ORAL at 21:36

## 2024-09-29 RX ADMIN — GABAPENTIN 900 MG: 300 CAPSULE ORAL at 08:27

## 2024-09-29 RX ADMIN — ACETAMINOPHEN 1000 MG: 500 TABLET ORAL at 14:03

## 2024-09-29 RX ADMIN — MIDODRINE HYDROCHLORIDE 10 MG: 5 TABLET ORAL at 10:54

## 2024-09-29 RX ADMIN — FAMOTIDINE 20 MG: 10 INJECTION, SOLUTION INTRAVENOUS at 21:37

## 2024-09-29 RX ADMIN — METHOCARBAMOL 750 MG: 750 TABLET ORAL at 08:27

## 2024-09-29 RX ADMIN — GABAPENTIN 900 MG: 300 CAPSULE ORAL at 14:03

## 2024-09-29 RX ADMIN — OXYCODONE HYDROCHLORIDE 10 MG: 5 TABLET ORAL at 20:05

## 2024-09-29 RX ADMIN — MIDODRINE HYDROCHLORIDE 10 MG: 5 TABLET ORAL at 08:27

## 2024-09-29 RX ADMIN — OXYCODONE HYDROCHLORIDE 10 MG: 5 TABLET ORAL at 04:33

## 2024-09-29 RX ADMIN — METHOCARBAMOL 750 MG: 750 TABLET ORAL at 14:02

## 2024-09-29 RX ADMIN — METHOCARBAMOL 750 MG: 750 TABLET ORAL at 21:37

## 2024-09-29 RX ADMIN — METHOCARBAMOL 750 MG: 750 TABLET ORAL at 18:43

## 2024-09-29 RX ADMIN — SENNOSIDES 8.6 MG: 8.6 TABLET, COATED ORAL at 21:37

## 2024-09-29 RX ADMIN — OXYCODONE 5 MG: 5 TABLET ORAL at 10:54

## 2024-09-29 RX ADMIN — ENOXAPARIN SODIUM 40 MG: 100 INJECTION SUBCUTANEOUS at 08:26

## 2024-09-29 RX ADMIN — ACETAMINOPHEN 1000 MG: 500 TABLET ORAL at 04:33

## 2024-09-29 RX ADMIN — SODIUM CHLORIDE, POTASSIUM CHLORIDE, SODIUM LACTATE AND CALCIUM CHLORIDE: 600; 310; 30; 20 INJECTION, SOLUTION INTRAVENOUS at 16:36

## 2024-09-29 RX ADMIN — OXYCODONE 5 MG: 5 TABLET ORAL at 15:21

## 2024-09-29 RX ADMIN — KETOROLAC TROMETHAMINE 15 MG: 15 INJECTION, SOLUTION INTRAMUSCULAR; INTRAVENOUS at 04:34

## 2024-09-29 RX ADMIN — FAMOTIDINE 20 MG: 10 INJECTION, SOLUTION INTRAVENOUS at 08:27

## 2024-09-29 RX ADMIN — POLYETHYLENE GLYCOL 3350 17 G: 17 POWDER, FOR SOLUTION ORAL at 08:27

## 2024-09-29 RX ADMIN — MIDODRINE HYDROCHLORIDE 10 MG: 5 TABLET ORAL at 18:43

## 2024-09-29 ASSESSMENT — PAIN SCALES - GENERAL
PAINLEVEL_OUTOF10: 7
PAINLEVEL_OUTOF10: 8
PAINLEVEL_OUTOF10: 7
PAINLEVEL_OUTOF10: 8
PAINLEVEL_OUTOF10: 6
PAINLEVEL_OUTOF10: 7
PAINLEVEL_OUTOF10: 5
PAINLEVEL_OUTOF10: 6
PAINLEVEL_OUTOF10: 8
PAINLEVEL_OUTOF10: 5
PAINLEVEL_OUTOF10: 8

## 2024-09-29 ASSESSMENT — PAIN DESCRIPTION - LOCATION
LOCATION: NECK
LOCATION: NECK;SHOULDER

## 2024-09-30 LAB
ANION GAP SERPL CALCULATED.3IONS-SCNC: 6 MMOL/L (ref 9–16)
BASOPHILS # BLD: <0.03 K/UL (ref 0–0.2)
BASOPHILS NFR BLD: 0 % (ref 0–2)
BUN SERPL-MCNC: 13 MG/DL (ref 6–20)
CA-I BLD-SCNC: 1.13 MMOL/L (ref 1.13–1.33)
CALCIUM SERPL-MCNC: 8.6 MG/DL (ref 8.6–10.4)
CHLORIDE SERPL-SCNC: 105 MMOL/L (ref 98–107)
CO2 SERPL-SCNC: 25 MMOL/L (ref 20–31)
CREAT SERPL-MCNC: 0.8 MG/DL (ref 0.7–1.2)
EOSINOPHIL # BLD: 0.27 K/UL (ref 0–0.44)
EOSINOPHILS RELATIVE PERCENT: 4 % (ref 1–4)
ERYTHROCYTE [DISTWIDTH] IN BLOOD BY AUTOMATED COUNT: 13 % (ref 11.8–14.4)
GFR, ESTIMATED: >90 ML/MIN/1.73M2
GLUCOSE SERPL-MCNC: 125 MG/DL (ref 74–99)
HCT VFR BLD AUTO: 39.1 % (ref 40.7–50.3)
HGB BLD-MCNC: 12.9 G/DL (ref 13–17)
IMM GRANULOCYTES # BLD AUTO: <0.03 K/UL (ref 0–0.3)
IMM GRANULOCYTES NFR BLD: 0 %
LYMPHOCYTES NFR BLD: 1.87 K/UL (ref 1.1–3.7)
LYMPHOCYTES RELATIVE PERCENT: 27 % (ref 24–43)
MAGNESIUM SERPL-MCNC: 1.9 MG/DL (ref 1.6–2.6)
MCH RBC QN AUTO: 31.9 PG (ref 25.2–33.5)
MCHC RBC AUTO-ENTMCNC: 33 G/DL (ref 28.4–34.8)
MCV RBC AUTO: 96.5 FL (ref 82.6–102.9)
MONOCYTES NFR BLD: 0.86 K/UL (ref 0.1–1.2)
MONOCYTES NFR BLD: 12 % (ref 3–12)
NEUTROPHILS NFR BLD: 57 % (ref 36–65)
NEUTS SEG NFR BLD: 3.99 K/UL (ref 1.5–8.1)
NRBC BLD-RTO: 0 PER 100 WBC
PHOSPHATE SERPL-MCNC: 3.3 MG/DL (ref 2.5–4.5)
PLATELET # BLD AUTO: 243 K/UL (ref 138–453)
PMV BLD AUTO: 9.8 FL (ref 8.1–13.5)
POTASSIUM SERPL-SCNC: 4.6 MMOL/L (ref 3.7–5.3)
RBC # BLD AUTO: 4.05 M/UL (ref 4.21–5.77)
SODIUM SERPL-SCNC: 136 MMOL/L (ref 136–145)
WBC OTHER # BLD: 7 K/UL (ref 3.5–11.3)

## 2024-09-30 PROCEDURE — 99232 SBSQ HOSP IP/OBS MODERATE 35: CPT | Performed by: PHYSICAL MEDICINE & REHABILITATION

## 2024-09-30 PROCEDURE — 84100 ASSAY OF PHOSPHORUS: CPT

## 2024-09-30 PROCEDURE — 94761 N-INVAS EAR/PLS OXIMETRY MLT: CPT

## 2024-09-30 PROCEDURE — 2580000003 HC RX 258: Performed by: PHYSICIAN ASSISTANT

## 2024-09-30 PROCEDURE — 2000000000 HC ICU R&B

## 2024-09-30 PROCEDURE — 6370000000 HC RX 637 (ALT 250 FOR IP)

## 2024-09-30 PROCEDURE — APPSS15 APP SPLIT SHARED TIME 0-15 MINUTES: Performed by: NURSE PRACTITIONER

## 2024-09-30 PROCEDURE — 2500000003 HC RX 250 WO HCPCS

## 2024-09-30 PROCEDURE — 85025 COMPLETE CBC W/AUTO DIFF WBC: CPT

## 2024-09-30 PROCEDURE — 2580000003 HC RX 258

## 2024-09-30 PROCEDURE — 83735 ASSAY OF MAGNESIUM: CPT

## 2024-09-30 PROCEDURE — 82330 ASSAY OF CALCIUM: CPT

## 2024-09-30 PROCEDURE — 6360000002 HC RX W HCPCS: Performed by: PHYSICIAN ASSISTANT

## 2024-09-30 PROCEDURE — 36415 COLL VENOUS BLD VENIPUNCTURE: CPT

## 2024-09-30 PROCEDURE — 97530 THERAPEUTIC ACTIVITIES: CPT

## 2024-09-30 PROCEDURE — 97110 THERAPEUTIC EXERCISES: CPT

## 2024-09-30 PROCEDURE — 80048 BASIC METABOLIC PNL TOTAL CA: CPT

## 2024-09-30 PROCEDURE — 6360000002 HC RX W HCPCS

## 2024-09-30 PROCEDURE — 6370000000 HC RX 637 (ALT 250 FOR IP): Performed by: PHYSICIAN ASSISTANT

## 2024-09-30 RX ORDER — METHOCARBAMOL 750 MG/1
750 TABLET, FILM COATED ORAL 3 TIMES DAILY
Status: DISCONTINUED | OUTPATIENT
Start: 2024-09-30 | End: 2024-10-01

## 2024-09-30 RX ORDER — OXYCODONE HYDROCHLORIDE 5 MG/1
5 TABLET ORAL EVERY 4 HOURS PRN
Status: DISCONTINUED | OUTPATIENT
Start: 2024-09-30 | End: 2024-10-01

## 2024-09-30 RX ORDER — FAMOTIDINE 20 MG/1
20 TABLET, FILM COATED ORAL 2 TIMES DAILY
Status: DISCONTINUED | OUTPATIENT
Start: 2024-09-30 | End: 2024-10-01

## 2024-09-30 RX ORDER — MAGNESIUM SULFATE IN WATER 40 MG/ML
2000 INJECTION, SOLUTION INTRAVENOUS ONCE
Status: COMPLETED | OUTPATIENT
Start: 2024-09-30 | End: 2024-09-30

## 2024-09-30 RX ORDER — OXYCODONE HYDROCHLORIDE 5 MG/1
5 TABLET ORAL EVERY 6 HOURS PRN
Status: DISCONTINUED | OUTPATIENT
Start: 2024-09-30 | End: 2024-10-01

## 2024-09-30 RX ORDER — IBUPROFEN 600 MG/1
600 TABLET, FILM COATED ORAL EVERY 6 HOURS
Status: DISCONTINUED | OUTPATIENT
Start: 2024-09-30 | End: 2024-10-02 | Stop reason: HOSPADM

## 2024-09-30 RX ADMIN — SODIUM CHLORIDE, PRESERVATIVE FREE 10 ML: 5 INJECTION INTRAVENOUS at 07:38

## 2024-09-30 RX ADMIN — SODIUM CHLORIDE, POTASSIUM CHLORIDE, SODIUM LACTATE AND CALCIUM CHLORIDE: 600; 310; 30; 20 INJECTION, SOLUTION INTRAVENOUS at 17:10

## 2024-09-30 RX ADMIN — MIDODRINE HYDROCHLORIDE 10 MG: 5 TABLET ORAL at 11:50

## 2024-09-30 RX ADMIN — ACETAMINOPHEN 1000 MG: 500 TABLET ORAL at 15:07

## 2024-09-30 RX ADMIN — MIDODRINE HYDROCHLORIDE 10 MG: 5 TABLET ORAL at 17:03

## 2024-09-30 RX ADMIN — IBUPROFEN 600 MG: 600 TABLET, FILM COATED ORAL at 11:50

## 2024-09-30 RX ADMIN — SODIUM CHLORIDE, POTASSIUM CHLORIDE, SODIUM LACTATE AND CALCIUM CHLORIDE: 600; 310; 30; 20 INJECTION, SOLUTION INTRAVENOUS at 03:34

## 2024-09-30 RX ADMIN — ENOXAPARIN SODIUM 40 MG: 100 INJECTION SUBCUTANEOUS at 07:37

## 2024-09-30 RX ADMIN — GABAPENTIN 900 MG: 300 CAPSULE ORAL at 15:07

## 2024-09-30 RX ADMIN — ACETAMINOPHEN 1000 MG: 500 TABLET ORAL at 05:33

## 2024-09-30 RX ADMIN — MAGNESIUM SULFATE HEPTAHYDRATE 2000 MG: 40 INJECTION, SOLUTION INTRAVENOUS at 07:57

## 2024-09-30 RX ADMIN — IBUPROFEN 600 MG: 600 TABLET, FILM COATED ORAL at 21:06

## 2024-09-30 RX ADMIN — METHOCARBAMOL 750 MG: 750 TABLET ORAL at 07:37

## 2024-09-30 RX ADMIN — METHOCARBAMOL 750 MG: 750 TABLET ORAL at 21:06

## 2024-09-30 RX ADMIN — MIDODRINE HYDROCHLORIDE 10 MG: 5 TABLET ORAL at 07:37

## 2024-09-30 RX ADMIN — POLYETHYLENE GLYCOL 3350 17 G: 17 POWDER, FOR SOLUTION ORAL at 07:37

## 2024-09-30 RX ADMIN — OXYCODONE HYDROCHLORIDE 10 MG: 5 TABLET ORAL at 07:37

## 2024-09-30 RX ADMIN — FAMOTIDINE 20 MG: 10 INJECTION, SOLUTION INTRAVENOUS at 07:37

## 2024-09-30 RX ADMIN — OXYCODONE 5 MG: 5 TABLET ORAL at 12:01

## 2024-09-30 RX ADMIN — METHOCARBAMOL 750 MG: 750 TABLET ORAL at 11:50

## 2024-09-30 RX ADMIN — OXYCODONE 5 MG: 5 TABLET ORAL at 17:03

## 2024-09-30 RX ADMIN — GABAPENTIN 900 MG: 300 CAPSULE ORAL at 07:36

## 2024-09-30 RX ADMIN — OXYCODONE HYDROCHLORIDE 10 MG: 5 TABLET ORAL at 02:36

## 2024-09-30 RX ADMIN — FAMOTIDINE 20 MG: 20 TABLET, FILM COATED ORAL at 21:06

## 2024-09-30 RX ADMIN — 0.12% CHLORHEXIDINE GLUCONATE 15 ML: 1.2 RINSE ORAL at 07:37

## 2024-09-30 RX ADMIN — ACETAMINOPHEN 1000 MG: 500 TABLET ORAL at 21:06

## 2024-09-30 RX ADMIN — OXYCODONE 5 MG: 5 TABLET ORAL at 21:06

## 2024-09-30 RX ADMIN — GABAPENTIN 900 MG: 300 CAPSULE ORAL at 21:06

## 2024-09-30 RX ADMIN — IBUPROFEN 600 MG: 600 TABLET, FILM COATED ORAL at 17:03

## 2024-09-30 ASSESSMENT — PAIN DESCRIPTION - ORIENTATION
ORIENTATION: RIGHT;LEFT;MID
ORIENTATION: RIGHT;LEFT
ORIENTATION: RIGHT;LEFT

## 2024-09-30 ASSESSMENT — PAIN SCALES - GENERAL
PAINLEVEL_OUTOF10: 7
PAINLEVEL_OUTOF10: 6
PAINLEVEL_OUTOF10: 8
PAINLEVEL_OUTOF10: 6
PAINLEVEL_OUTOF10: 8
PAINLEVEL_OUTOF10: 5
PAINLEVEL_OUTOF10: 7
PAINLEVEL_OUTOF10: 8
PAINLEVEL_OUTOF10: 7
PAINLEVEL_OUTOF10: 7
PAINLEVEL_OUTOF10: 2
PAINLEVEL_OUTOF10: 6

## 2024-09-30 ASSESSMENT — PAIN DESCRIPTION - LOCATION
LOCATION: ARM;LEG
LOCATION: ARM;LEG

## 2024-09-30 NOTE — CARE COORDINATION
Transition Planning    Pt's choice for ARU is [facility]. Referral sent. Call to Melida from [facility]. SNF choices and referrals to [facility], [facility], and [facility].     1210 Call from Helga with [facility]- able to accept.     1230 Call from Melida with [facility]- able to accept.     1255 Pt agreeable to discharge to [facility].     1435 VM from [facility] with [facility]- declined.

## 2024-09-30 NOTE — DISCHARGE INSTRUCTIONS
the incision. Please check your incisions twice daily.   Fevers greater than 101.5 degrees   Flu-like symptoms, chills, shakes, chest pain, shortness of breath, nausea, vomiting, diarrhea   New or increased pain, numbness or tingling in the arms or legs, as well as new or increased balance or coordination issues.   New difficulty with urinating or holding your bladder or your bowels     *If you are unable to contact someone at the office and your symptoms persist or increase, call 911 or go to the emergency department.     Follow up  Follow up with neurosurgery in 2 weeks for post op wound check

## 2024-10-01 LAB
ANION GAP SERPL CALCULATED.3IONS-SCNC: 6 MMOL/L (ref 9–16)
BASOPHILS # BLD: 0.03 K/UL (ref 0–0.2)
BASOPHILS NFR BLD: 1 % (ref 0–2)
BUN SERPL-MCNC: 14 MG/DL (ref 6–20)
CA-I BLD-SCNC: 1.17 MMOL/L (ref 1.13–1.33)
CALCIUM SERPL-MCNC: 8.2 MG/DL (ref 8.6–10.4)
CHLORIDE SERPL-SCNC: 106 MMOL/L (ref 98–107)
CO2 SERPL-SCNC: 25 MMOL/L (ref 20–31)
CREAT SERPL-MCNC: 0.7 MG/DL (ref 0.7–1.2)
EOSINOPHIL # BLD: 0.28 K/UL (ref 0–0.44)
EOSINOPHILS RELATIVE PERCENT: 5 % (ref 1–4)
ERYTHROCYTE [DISTWIDTH] IN BLOOD BY AUTOMATED COUNT: 13.3 % (ref 11.8–14.4)
GFR, ESTIMATED: >90 ML/MIN/1.73M2
GLUCOSE SERPL-MCNC: 137 MG/DL (ref 74–99)
HCT VFR BLD AUTO: 38.5 % (ref 40.7–50.3)
HGB BLD-MCNC: 12.5 G/DL (ref 13–17)
IMM GRANULOCYTES # BLD AUTO: 0.03 K/UL (ref 0–0.3)
IMM GRANULOCYTES NFR BLD: 1 %
LYMPHOCYTES NFR BLD: 1.76 K/UL (ref 1.1–3.7)
LYMPHOCYTES RELATIVE PERCENT: 32 % (ref 24–43)
MAGNESIUM SERPL-MCNC: 1.9 MG/DL (ref 1.6–2.6)
MCH RBC QN AUTO: 31.9 PG (ref 25.2–33.5)
MCHC RBC AUTO-ENTMCNC: 32.5 G/DL (ref 28.4–34.8)
MCV RBC AUTO: 98.2 FL (ref 82.6–102.9)
MONOCYTES NFR BLD: 0.91 K/UL (ref 0.1–1.2)
MONOCYTES NFR BLD: 17 % (ref 3–12)
NEUTROPHILS NFR BLD: 44 % (ref 36–65)
NEUTS SEG NFR BLD: 2.42 K/UL (ref 1.5–8.1)
NRBC BLD-RTO: 0 PER 100 WBC
PHOSPHATE SERPL-MCNC: 3.1 MG/DL (ref 2.5–4.5)
PLATELET # BLD AUTO: 227 K/UL (ref 138–453)
PMV BLD AUTO: 9.7 FL (ref 8.1–13.5)
POTASSIUM SERPL-SCNC: 4.4 MMOL/L (ref 3.7–5.3)
RBC # BLD AUTO: 3.92 M/UL (ref 4.21–5.77)
SODIUM SERPL-SCNC: 137 MMOL/L (ref 136–145)
WBC OTHER # BLD: 5.4 K/UL (ref 3.5–11.3)

## 2024-10-01 PROCEDURE — 2580000003 HC RX 258

## 2024-10-01 PROCEDURE — 6360000002 HC RX W HCPCS: Performed by: PHYSICIAN ASSISTANT

## 2024-10-01 PROCEDURE — 84100 ASSAY OF PHOSPHORUS: CPT

## 2024-10-01 PROCEDURE — 6370000000 HC RX 637 (ALT 250 FOR IP): Performed by: PHYSICIAN ASSISTANT

## 2024-10-01 PROCEDURE — 6370000000 HC RX 637 (ALT 250 FOR IP)

## 2024-10-01 PROCEDURE — 82330 ASSAY OF CALCIUM: CPT

## 2024-10-01 PROCEDURE — 36415 COLL VENOUS BLD VENIPUNCTURE: CPT

## 2024-10-01 PROCEDURE — 2580000003 HC RX 258: Performed by: PHYSICIAN ASSISTANT

## 2024-10-01 PROCEDURE — 85025 COMPLETE CBC W/AUTO DIFF WBC: CPT

## 2024-10-01 PROCEDURE — 97530 THERAPEUTIC ACTIVITIES: CPT

## 2024-10-01 PROCEDURE — 83735 ASSAY OF MAGNESIUM: CPT

## 2024-10-01 PROCEDURE — 6370000000 HC RX 637 (ALT 250 FOR IP): Performed by: NURSE PRACTITIONER

## 2024-10-01 PROCEDURE — 97110 THERAPEUTIC EXERCISES: CPT

## 2024-10-01 PROCEDURE — 80048 BASIC METABOLIC PNL TOTAL CA: CPT

## 2024-10-01 PROCEDURE — 1200000000 HC SEMI PRIVATE

## 2024-10-01 PROCEDURE — 94761 N-INVAS EAR/PLS OXIMETRY MLT: CPT

## 2024-10-01 PROCEDURE — 97535 SELF CARE MNGMENT TRAINING: CPT

## 2024-10-01 RX ORDER — PANTOPRAZOLE SODIUM 20 MG/1
20 TABLET, DELAYED RELEASE ORAL
Status: DISCONTINUED | OUTPATIENT
Start: 2024-10-02 | End: 2024-10-02 | Stop reason: HOSPADM

## 2024-10-01 RX ORDER — OXYCODONE HYDROCHLORIDE 5 MG/1
5 TABLET ORAL EVERY 4 HOURS PRN
Status: DISCONTINUED | OUTPATIENT
Start: 2024-10-01 | End: 2024-10-02 | Stop reason: HOSPADM

## 2024-10-01 RX ORDER — MIDODRINE HYDROCHLORIDE 5 MG/1
5 TABLET ORAL
Status: COMPLETED | OUTPATIENT
Start: 2024-10-01 | End: 2024-10-02

## 2024-10-01 RX ORDER — CYCLOBENZAPRINE HCL 10 MG
10 TABLET ORAL 3 TIMES DAILY
Status: DISCONTINUED | OUTPATIENT
Start: 2024-10-01 | End: 2024-10-02 | Stop reason: HOSPADM

## 2024-10-01 RX ADMIN — MIDODRINE HYDROCHLORIDE 5 MG: 5 TABLET ORAL at 13:10

## 2024-10-01 RX ADMIN — GABAPENTIN 900 MG: 300 CAPSULE ORAL at 20:13

## 2024-10-01 RX ADMIN — ENOXAPARIN SODIUM 40 MG: 100 INJECTION SUBCUTANEOUS at 09:57

## 2024-10-01 RX ADMIN — IBUPROFEN 600 MG: 600 TABLET, FILM COATED ORAL at 09:57

## 2024-10-01 RX ADMIN — GABAPENTIN 900 MG: 300 CAPSULE ORAL at 09:56

## 2024-10-01 RX ADMIN — OXYCODONE 5 MG: 5 TABLET ORAL at 17:51

## 2024-10-01 RX ADMIN — ACETAMINOPHEN 1000 MG: 500 TABLET ORAL at 13:09

## 2024-10-01 RX ADMIN — CYCLOBENZAPRINE 10 MG: 10 TABLET, FILM COATED ORAL at 20:12

## 2024-10-01 RX ADMIN — MIDODRINE HYDROCHLORIDE 5 MG: 5 TABLET ORAL at 17:50

## 2024-10-01 RX ADMIN — ACETAMINOPHEN 1000 MG: 500 TABLET ORAL at 06:46

## 2024-10-01 RX ADMIN — SODIUM CHLORIDE, PRESERVATIVE FREE 10 ML: 5 INJECTION INTRAVENOUS at 10:42

## 2024-10-01 RX ADMIN — OXYCODONE 5 MG: 5 TABLET ORAL at 01:24

## 2024-10-01 RX ADMIN — SODIUM CHLORIDE, POTASSIUM CHLORIDE, SODIUM LACTATE AND CALCIUM CHLORIDE: 600; 310; 30; 20 INJECTION, SOLUTION INTRAVENOUS at 06:35

## 2024-10-01 RX ADMIN — CYCLOBENZAPRINE 10 MG: 10 TABLET, FILM COATED ORAL at 13:10

## 2024-10-01 RX ADMIN — POLYETHYLENE GLYCOL 3350 17 G: 17 POWDER, FOR SOLUTION ORAL at 09:57

## 2024-10-01 RX ADMIN — FAMOTIDINE 20 MG: 20 TABLET, FILM COATED ORAL at 09:57

## 2024-10-01 RX ADMIN — METHOCARBAMOL 750 MG: 750 TABLET ORAL at 09:57

## 2024-10-01 RX ADMIN — IBUPROFEN 600 MG: 600 TABLET, FILM COATED ORAL at 22:53

## 2024-10-01 RX ADMIN — OXYCODONE 5 MG: 5 TABLET ORAL at 22:54

## 2024-10-01 RX ADMIN — IBUPROFEN 600 MG: 600 TABLET, FILM COATED ORAL at 06:47

## 2024-10-01 RX ADMIN — ACETAMINOPHEN 1000 MG: 500 TABLET ORAL at 20:13

## 2024-10-01 RX ADMIN — OXYCODONE 5 MG: 5 TABLET ORAL at 13:10

## 2024-10-01 RX ADMIN — OXYCODONE 5 MG: 5 TABLET ORAL at 06:47

## 2024-10-01 RX ADMIN — 0.12% CHLORHEXIDINE GLUCONATE 15 ML: 1.2 RINSE ORAL at 09:56

## 2024-10-01 RX ADMIN — MIDODRINE HYDROCHLORIDE 10 MG: 5 TABLET ORAL at 09:57

## 2024-10-01 RX ADMIN — GABAPENTIN 900 MG: 300 CAPSULE ORAL at 13:10

## 2024-10-01 RX ADMIN — IBUPROFEN 600 MG: 600 TABLET, FILM COATED ORAL at 17:50

## 2024-10-01 ASSESSMENT — PAIN DESCRIPTION - ORIENTATION
ORIENTATION: RIGHT;LEFT
ORIENTATION: RIGHT;LEFT
ORIENTATION: RIGHT;LEFT;MID
ORIENTATION: RIGHT;LEFT
ORIENTATION: RIGHT;LEFT;MID

## 2024-10-01 ASSESSMENT — PAIN DESCRIPTION - FREQUENCY
FREQUENCY: INTERMITTENT
FREQUENCY: INTERMITTENT

## 2024-10-01 ASSESSMENT — PAIN DESCRIPTION - LOCATION
LOCATION: ARM;NECK;SHOULDER
LOCATION: GENERALIZED
LOCATION: HAND;NECK
LOCATION: HAND;ARM

## 2024-10-01 ASSESSMENT — PAIN DESCRIPTION - ONSET
ONSET: GRADUAL
ONSET: GRADUAL

## 2024-10-01 ASSESSMENT — PAIN SCALES - GENERAL
PAINLEVEL_OUTOF10: 3
PAINLEVEL_OUTOF10: 7
PAINLEVEL_OUTOF10: 4
PAINLEVEL_OUTOF10: 7
PAINLEVEL_OUTOF10: 7
PAINLEVEL_OUTOF10: 3
PAINLEVEL_OUTOF10: 9
PAINLEVEL_OUTOF10: 6
PAINLEVEL_OUTOF10: 2
PAINLEVEL_OUTOF10: 7

## 2024-10-01 ASSESSMENT — PAIN DESCRIPTION - PAIN TYPE
TYPE: ACUTE PAIN
TYPE: ACUTE PAIN

## 2024-10-01 ASSESSMENT — PAIN DESCRIPTION - DESCRIPTORS
DESCRIPTORS: PINS AND NEEDLES
DESCRIPTORS: ACHING;DULL;NUMBNESS
DESCRIPTORS: ACHING;GNAWING
DESCRIPTORS: ACHING;DISCOMFORT
DESCRIPTORS: DULL;DISCOMFORT

## 2024-10-01 ASSESSMENT — PAIN - FUNCTIONAL ASSESSMENT
PAIN_FUNCTIONAL_ASSESSMENT: PREVENTS OR INTERFERES SOME ACTIVE ACTIVITIES AND ADLS
PAIN_FUNCTIONAL_ASSESSMENT: PREVENTS OR INTERFERES WITH MANY ACTIVE NOT PASSIVE ACTIVITIES
PAIN_FUNCTIONAL_ASSESSMENT: PREVENTS OR INTERFERES SOME ACTIVE ACTIVITIES AND ADLS

## 2024-10-01 NOTE — CARE COORDINATION
Transition Planning    HIPAA compliant Voice Mail left with Melida from [facility] to start precert.     0950 Call from Melida from [facility]- starting precert.

## 2024-10-01 NOTE — CARE COORDINATION
Trauma Coordinator Rounding Note  Daily check in:  I met with Lauro Munroe  at bedside to review their plan of care. I allowed opportunity for Lauro Munroe to ask questions regarding their injuries, expected length of stay and disposition plan. He voiced concern about how he is not able to care for himself independently. Reassurance given.  All questions answered to verbal satisfaction.   [x]Wounds  [x]PT/OT/speech  [x]Incentive spirometer  [x]Diet  [x]Activity  Bedside Nurse:  I attempted to speak with the patient's assigned nurse to review the plan of care for today and provide an opportunity to ask questions or relay any concerns to the Trauma service.  She was busy with another patient at the time.   :  I provided a clinical update to the unit  and confirmed the disposition plan. Current barriers to discharge include: facility acceptance and bed availability.  Electronically signed by Landy Garcia RN on 10/1/2024 at 4:28 PM

## 2024-10-02 VITALS
DIASTOLIC BLOOD PRESSURE: 86 MMHG | BODY MASS INDEX: 26.95 KG/M2 | HEIGHT: 74 IN | OXYGEN SATURATION: 97 % | SYSTOLIC BLOOD PRESSURE: 143 MMHG | TEMPERATURE: 97.9 F | RESPIRATION RATE: 16 BRPM | HEART RATE: 81 BPM | WEIGHT: 210 LBS

## 2024-10-02 LAB
ANION GAP SERPL CALCULATED.3IONS-SCNC: 6 MMOL/L (ref 9–16)
BASOPHILS # BLD: 0.04 K/UL (ref 0–0.2)
BASOPHILS NFR BLD: 1 % (ref 0–2)
BUN SERPL-MCNC: 16 MG/DL (ref 6–20)
CALCIUM SERPL-MCNC: 8.2 MG/DL (ref 8.6–10.4)
CHLORIDE SERPL-SCNC: 106 MMOL/L (ref 98–107)
CO2 SERPL-SCNC: 25 MMOL/L (ref 20–31)
CREAT SERPL-MCNC: 0.8 MG/DL (ref 0.7–1.2)
EOSINOPHIL # BLD: 0.32 K/UL (ref 0–0.44)
EOSINOPHILS RELATIVE PERCENT: 6 % (ref 1–4)
ERYTHROCYTE [DISTWIDTH] IN BLOOD BY AUTOMATED COUNT: 13.3 % (ref 11.8–14.4)
GFR, ESTIMATED: >90 ML/MIN/1.73M2
GLUCOSE SERPL-MCNC: 134 MG/DL (ref 74–99)
HCT VFR BLD AUTO: 37.6 % (ref 40.7–50.3)
HGB BLD-MCNC: 12.5 G/DL (ref 13–17)
IMM GRANULOCYTES # BLD AUTO: <0.03 K/UL (ref 0–0.3)
IMM GRANULOCYTES NFR BLD: 0 %
LYMPHOCYTES NFR BLD: 1.87 K/UL (ref 1.1–3.7)
LYMPHOCYTES RELATIVE PERCENT: 36 % (ref 24–43)
MCH RBC QN AUTO: 32.3 PG (ref 25.2–33.5)
MCHC RBC AUTO-ENTMCNC: 33.2 G/DL (ref 28.4–34.8)
MCV RBC AUTO: 97.2 FL (ref 82.6–102.9)
MONOCYTES NFR BLD: 0.74 K/UL (ref 0.1–1.2)
MONOCYTES NFR BLD: 14 % (ref 3–12)
NEUTROPHILS NFR BLD: 43 % (ref 36–65)
NEUTS SEG NFR BLD: 2.18 K/UL (ref 1.5–8.1)
NRBC BLD-RTO: 0 PER 100 WBC
PLATELET # BLD AUTO: 262 K/UL (ref 138–453)
PMV BLD AUTO: 9.5 FL (ref 8.1–13.5)
POTASSIUM SERPL-SCNC: 4.3 MMOL/L (ref 3.7–5.3)
RBC # BLD AUTO: 3.87 M/UL (ref 4.21–5.77)
SODIUM SERPL-SCNC: 137 MMOL/L (ref 136–145)
WBC OTHER # BLD: 5.2 K/UL (ref 3.5–11.3)

## 2024-10-02 PROCEDURE — 6370000000 HC RX 637 (ALT 250 FOR IP): Performed by: PHYSICIAN ASSISTANT

## 2024-10-02 PROCEDURE — 36415 COLL VENOUS BLD VENIPUNCTURE: CPT

## 2024-10-02 PROCEDURE — 99238 HOSP IP/OBS DSCHRG MGMT 30/<: CPT | Performed by: SURGERY

## 2024-10-02 PROCEDURE — 6370000000 HC RX 637 (ALT 250 FOR IP)

## 2024-10-02 PROCEDURE — 6360000002 HC RX W HCPCS

## 2024-10-02 PROCEDURE — 6360000002 HC RX W HCPCS: Performed by: PHYSICIAN ASSISTANT

## 2024-10-02 PROCEDURE — 6370000000 HC RX 637 (ALT 250 FOR IP): Performed by: NURSE PRACTITIONER

## 2024-10-02 PROCEDURE — 85025 COMPLETE CBC W/AUTO DIFF WBC: CPT

## 2024-10-02 PROCEDURE — 2580000003 HC RX 258: Performed by: ANESTHESIOLOGY

## 2024-10-02 PROCEDURE — 80048 BASIC METABOLIC PNL TOTAL CA: CPT

## 2024-10-02 RX ORDER — SENNOSIDES A AND B 8.6 MG/1
1 TABLET, FILM COATED ORAL DAILY PRN
Qty: 30 TABLET | Refills: 0 | Status: SHIPPED | OUTPATIENT
Start: 2024-10-02 | End: 2024-11-01

## 2024-10-02 RX ORDER — IBUPROFEN 600 MG/1
600 TABLET, FILM COATED ORAL EVERY 6 HOURS
Qty: 120 TABLET | Refills: 3 | Status: SHIPPED | OUTPATIENT
Start: 2024-10-02

## 2024-10-02 RX ORDER — ACETAMINOPHEN 500 MG
1000 TABLET ORAL EVERY 8 HOURS SCHEDULED
Qty: 120 TABLET | Refills: 3 | Status: SHIPPED | OUTPATIENT
Start: 2024-10-02

## 2024-10-02 RX ORDER — PANTOPRAZOLE SODIUM 20 MG/1
20 TABLET, DELAYED RELEASE ORAL
Qty: 30 TABLET | Refills: 3 | Status: SHIPPED | OUTPATIENT
Start: 2024-10-02 | End: 2024-10-02 | Stop reason: HOSPADM

## 2024-10-02 RX ORDER — ENOXAPARIN SODIUM 100 MG/ML
40 INJECTION SUBCUTANEOUS DAILY
Qty: 5.6 ML | Refills: 0 | Status: SHIPPED | OUTPATIENT
Start: 2024-10-03 | End: 2024-10-17

## 2024-10-02 RX ORDER — GABAPENTIN 300 MG/1
900 CAPSULE ORAL 3 TIMES DAILY
Qty: 270 CAPSULE | Refills: 0 | Status: SHIPPED | OUTPATIENT
Start: 2024-10-02 | End: 2024-11-01

## 2024-10-02 RX ORDER — OXYCODONE HYDROCHLORIDE 5 MG/1
5 TABLET ORAL EVERY 4 HOURS PRN
Qty: 28 TABLET | Refills: 0 | Status: SHIPPED | OUTPATIENT
Start: 2024-10-02 | End: 2024-10-09

## 2024-10-02 RX ORDER — POLYETHYLENE GLYCOL 3350 17 G/17G
17 POWDER, FOR SOLUTION ORAL DAILY
Qty: 527 G | Refills: 1 | Status: SHIPPED | OUTPATIENT
Start: 2024-10-03 | End: 2024-12-04

## 2024-10-02 RX ORDER — CYCLOBENZAPRINE HCL 10 MG
10 TABLET ORAL 3 TIMES DAILY
Qty: 30 TABLET | Refills: 0 | Status: SHIPPED | OUTPATIENT
Start: 2024-10-02 | End: 2024-10-12

## 2024-10-02 RX ORDER — BISACODYL 10 MG
10 SUPPOSITORY, RECTAL RECTAL DAILY PRN
Qty: 30 SUPPOSITORY | Refills: 0 | Status: SHIPPED | OUTPATIENT
Start: 2024-10-02 | End: 2024-11-01

## 2024-10-02 RX ADMIN — GABAPENTIN 900 MG: 300 CAPSULE ORAL at 15:12

## 2024-10-02 RX ADMIN — SODIUM CHLORIDE, PRESERVATIVE FREE 10 ML: 5 INJECTION INTRAVENOUS at 08:03

## 2024-10-02 RX ADMIN — MIDODRINE HYDROCHLORIDE 5 MG: 5 TABLET ORAL at 08:03

## 2024-10-02 RX ADMIN — IBUPROFEN 600 MG: 600 TABLET, FILM COATED ORAL at 10:46

## 2024-10-02 RX ADMIN — IBUPROFEN 600 MG: 600 TABLET, FILM COATED ORAL at 05:36

## 2024-10-02 RX ADMIN — POLYETHYLENE GLYCOL 3350 17 G: 17 POWDER, FOR SOLUTION ORAL at 09:06

## 2024-10-02 RX ADMIN — FENTANYL CITRATE 50 MCG: 50 INJECTION, SOLUTION INTRAMUSCULAR; INTRAVENOUS at 10:47

## 2024-10-02 RX ADMIN — ACETAMINOPHEN 1000 MG: 500 TABLET ORAL at 06:21

## 2024-10-02 RX ADMIN — PANTOPRAZOLE SODIUM 20 MG: 20 TABLET, DELAYED RELEASE ORAL at 06:22

## 2024-10-02 RX ADMIN — CYCLOBENZAPRINE 10 MG: 10 TABLET, FILM COATED ORAL at 09:06

## 2024-10-02 RX ADMIN — ACETAMINOPHEN 1000 MG: 500 TABLET ORAL at 15:12

## 2024-10-02 RX ADMIN — GABAPENTIN 900 MG: 300 CAPSULE ORAL at 09:05

## 2024-10-02 RX ADMIN — OXYCODONE 5 MG: 5 TABLET ORAL at 08:02

## 2024-10-02 RX ADMIN — ENOXAPARIN SODIUM 40 MG: 100 INJECTION SUBCUTANEOUS at 09:06

## 2024-10-02 RX ADMIN — FENTANYL CITRATE 50 MCG: 50 INJECTION, SOLUTION INTRAMUSCULAR; INTRAVENOUS at 05:59

## 2024-10-02 RX ADMIN — OXYCODONE 5 MG: 5 TABLET ORAL at 03:56

## 2024-10-02 RX ADMIN — CYCLOBENZAPRINE 10 MG: 10 TABLET, FILM COATED ORAL at 15:12

## 2024-10-02 RX ADMIN — OXYCODONE 5 MG: 5 TABLET ORAL at 12:58

## 2024-10-02 ASSESSMENT — PAIN DESCRIPTION - PAIN TYPE
TYPE: ACUTE PAIN
TYPE: ACUTE PAIN

## 2024-10-02 ASSESSMENT — PAIN DESCRIPTION - ORIENTATION
ORIENTATION: RIGHT;LEFT;MID
ORIENTATION: RIGHT;LEFT
ORIENTATION: RIGHT;LEFT;MID
ORIENTATION: RIGHT;LEFT;MID
ORIENTATION: LEFT;RIGHT;MID

## 2024-10-02 ASSESSMENT — PAIN DESCRIPTION - LOCATION
LOCATION: GENERALIZED
LOCATION: GENERALIZED
LOCATION: HAND;NECK
LOCATION: HAND;SHOULDER;NECK
LOCATION: HAND;NECK;SHOULDER
LOCATION: HAND;NECK
LOCATION: NECK;HAND;SHOULDER

## 2024-10-02 ASSESSMENT — PAIN SCALES - GENERAL
PAINLEVEL_OUTOF10: 6
PAINLEVEL_OUTOF10: 9
PAINLEVEL_OUTOF10: 7
PAINLEVEL_OUTOF10: 8
PAINLEVEL_OUTOF10: 10
PAINLEVEL_OUTOF10: 7
PAINLEVEL_OUTOF10: 8
PAINLEVEL_OUTOF10: 3

## 2024-10-02 ASSESSMENT — PAIN DESCRIPTION - DESCRIPTORS
DESCRIPTORS: DULL;DISCOMFORT
DESCRIPTORS: PINS AND NEEDLES
DESCRIPTORS: DULL;ACHING
DESCRIPTORS: DISCOMFORT;TINGLING

## 2024-10-02 ASSESSMENT — PAIN DESCRIPTION - ONSET
ONSET: PROGRESSIVE
ONSET: GRADUAL

## 2024-10-02 ASSESSMENT — PAIN - FUNCTIONAL ASSESSMENT
PAIN_FUNCTIONAL_ASSESSMENT: PREVENTS OR INTERFERES WITH MANY ACTIVE NOT PASSIVE ACTIVITIES
PAIN_FUNCTIONAL_ASSESSMENT: PREVENTS OR INTERFERES WITH MANY ACTIVE NOT PASSIVE ACTIVITIES

## 2024-10-02 ASSESSMENT — PAIN DESCRIPTION - FREQUENCY
FREQUENCY: CONTINUOUS
FREQUENCY: CONTINUOUS

## 2024-10-02 NOTE — DISCHARGE SUMMARY
cord syndrome. 4. No flow limiting stenosis or acute injury of the cervical carotid/vertebral arteries. 5. No significant stenosis of the [id number]. 6. Moderate size left pneumothorax.     CT HEAD WO CONTRAST    Result Date: 9/24/2024  EXAMINATION: CTA OF THE HEAD AND NECK WITH CONTRAST; CT OF THE HEAD WITHOUT CONTRAST; CT OF THE CERVICAL SPINE WITHOUT CONTRAST 9/24/2024 10:41 am: TECHNIQUE: CTA of the head and neck was performed with the administration of intravenous contrast. Multiplanar reformatted images are provided for review.  MIP images are provided for review. Stenosis of the internal carotid arteries measured using NASCET criteria. Automated exposure control, iterative reconstruction, and/or weight based adjustment of the mA/kV was utilized to reduce the radiation dose to as low as reasonably achievable.; CT of the head was performed without the administration of intravenous contrast. Automated exposure control, iterative reconstruction, and/or weight based adjustment of the mA/kV was utilized to reduce the radiation dose to as low as reasonably achievable.; CT of the cervical spine was performed without the administration of intravenous contrast. Multiplanar reformatted images are provided for review. Automated exposure control, iterative reconstruction, and/or weight based adjustment of the mA/kV was utilized to reduce the radiation dose to as low as reasonably achievable. 3D reconstructed images were performed on a separate workstation and provided for review. COMPARISON: None. HISTORY: ORDERING SYSTEM PROVIDED HISTORY: trauma TECHNOLOGIST PROVIDED HISTORY: trauma Decision Support Exception - unselect if not a suspected or confirmed emergency medical condition->Emergency Medical Condition (MA); ORDERING SYSTEM PROVIDED HISTORY: trauma TECHNOLOGIST PROVIDED HISTORY: trauma; ORDERING SYSTEM PROVIDED HISTORY: trauma TECHNOLOGIST PROVIDED HISTORY: trauma FINDINGS: CT HEAD: BRAIN/VENTRICLES:  No acute

## 2024-10-02 NOTE — DISCHARGE INSTR - COC
Continuity of Care Form    Patient Name: Lauro Munroe   :  1970  MRN:  [id number]    Admit date:  2024  Discharge date:  10/2/2024    Code Status Order: Full Code   Advance Directives:   Advance Care Flowsheet Documentation        Date/Time Healthcare Directive Type of Healthcare Directive Copy in Chart Healthcare Agent Appointed Healthcare Agent's Name Healthcare Agent's Phone Number    24 1330 No, patient does not have an advance directive for healthcare treatment  --  --  --  --  --                     Admitting Physician:  Wagner Ovalle MD  PCP: Per Thomson MD    Discharging Nurse: Shanita  Discharging Hospital Unit/Room#:   Discharging Unit Phone Number: [phone]    Emergency Contact:   Extended Emergency Contact Information  Primary Emergency Contact: Nicole Munroe  Mobile Phone: [phone]  Relation: Brother/Sister  Preferred language: English   needed? No    Past Surgical History:  Past Surgical History:   Procedure Laterality Date    CERVICAL FUSION N/A 2024    C3-C5 LAMINECTOMY, C6-C7 LAMINOTOMY, C3-C5 FUSION performed by Linda Mckeon DO at [facility] OR    CERVICAL LAMINECTOMY  2024    C3-C6 POSS C7 LAMINECTOMY, C3-C6 FUSION POSS TO T1       Immunization History:   Immunization History   Administered Date(s) Administered    TDaP, ADACEL (age 10y-64y), BOOSTRIX (age 10y+), IM, 0.5mL 2024       Active Problems:  Patient Active Problem List   Diagnosis Code    Central cord syndrome, subsequent encounter ([facility]) S14.129D    Fall W19.XXXA    C5-C7 level spinal cord injury ([facility]) S14.105A       Isolation/Infection:   Isolation            No Isolation          Patient Infection Status       None to display            Nurse Assessment:  Last Vital Signs: BP (!) 143/86   Pulse 81   Temp 97.9 °F (36.6 °C) (Oral)   Resp 14   Ht 1.88 m (6' 2\")   Wt 95.3 kg (210 lb)   SpO2 97%   BMI 26.96 kg/m²     Last documented pain score (0-10 scale): Pain Level:

## 2024-10-02 NOTE — PROGRESS NOTES
Physical Therapy Cancel Note      DATE: 2024    NAME: Lauro Munroe  MRN: [id number]   : 1970      Patient not seen this date for Physical Therapy due to:    Surgery/Procedure: neurosurgery Recommending  C3-7 decompression and fusion,possible to T1; plan is for surgery today; check status       Electronically signed by Laith Price PT on 2024 at 9:17 AM      
    Trauma/Surgical Critical Care Sign Out Note:       Code Status: Full Code    Mode of provider to provider communication:        [x] Via telephone   [x] In person     Date and time of sign-out: 10/1/2024 8:22 PM     Criteria Met for Transfer:  [x]   Oxygen saturation is > 90% on FiO2< 50% (exceptions may be made for patient pathophysiology)    [x]   Vital signs remain at or near baseline without pharmaceutical adjuncts, blood products, or > 2L fluid bolus in the last 24 hours  [x]   No suspicion or evidence of a new untreated infection (confusion, cool or cyanotic extremities, poor capillary refill, metabolic acidosis, low urine output)  [x]   Stable GCS, seizures controlled, no invasive neurological monitoring   [x]   Altered mental status is stable and able to be safely managed outside the ICU  [x]   No deterioration in renal function in the last 24 hours (creatinine > 50% increase, new onset oliguria)  [x]   Patient care needs do not exceed the capabilities of the unit they are being transferred to (suctioning needs, glucose monitoring, neurological monitoring, neurovascular checks, vital sign monitoring, I&O monitoring, drain management  [x]   No longer requiring mechanical ventilation via endotracheal intubation and/or decreasing O2/CPAP requirements  [x]   No need for medications that cannot be administered outside the ICU      Reason for ICU admission:  MAP pushes to keep MAP>85 per Neurosurgery for 7 days    Injuries:  rib fx, BLE/BUE numbness/weakness for central cord syndrome     ICU course summary:  Trauma  CC: Fall 10 ft    Injuries: rib fx (Rib 8, IS 1500), BLE/BUE numbness/weakness for central cord syndrome     Mhx: Smoking hx  Shx:     Hospital Course:  9/24/2024 admit to TICU, Rib score 8, MAP pushes,   9/25: OR with neurosurgery, PIC 10  9/26: Extubated, added toraldo, fentanyl pRN, on levo  9/27: hall out, ADRIA drain removed per nsgy  9/27: Midodrine started  9/28: monitor MAP, continued 
    Trauma/Surgical Critical Care Sign Out Note:       Code Status: Full Code    Mode of provider to provider communication:        [x] Via telephone   [x] In person     Date and time of sign-out: 10/2/2024 11:24 AM     Criteria Met for Transfer:  [x]   Oxygen saturation is > 90% on FiO2< 50% (exceptions may be made for patient pathophysiology)    [x]   Vital signs remain at or near baseline without pharmaceutical adjuncts, blood products, or > 2L fluid bolus in the last 24 hours  [x]   No suspicion or evidence of a new untreated infection (confusion, cool or cyanotic extremities, poor capillary refill, metabolic acidosis, low urine output)  [x]   Stable GCS, seizures controlled, no invasive neurological monitoring   [x]   Altered mental status is stable and able to be safely managed outside the ICU  [x]   No deterioration in renal function in the last 24 hours (creatinine > 50% increase, new onset oliguria)  [x]   Patient care needs do not exceed the capabilities of the unit they are being transferred to (suctioning needs, glucose monitoring, neurological monitoring, neurovascular checks, vital sign monitoring, I&O monitoring, drain management  [x]   No longer requiring mechanical ventilation via endotracheal intubation and/or decreasing O2/CPAP requirements  [x]   No need for medications that cannot be administered outside the ICU  [x]   PIC score >6      Reason for ICU admission:  MAP pushes to keep MAP>85 per Neurosurgery for 7 days. Finished    Injuries:  rib fx, BLE/BUE numbness/weakness for central cord syndrome     ICU course summary:  Trauma  CC: Fall 10 ft     Injuries: rib fx (Rib 8, IS 1500), BLE/BUE numbness/weakness for central cord syndrome      Mhx: Smoking hx  Shx:      Hospital Course:  9/24/2024 admit to TICU, Rib score 8, MAP pushes,   9/25: OR with neurosurgery, PIC 10  9/26: Extubated, added toraldo, fentanyl pRN, on levo  9/27: hall out, ADRIA drain removed per nsgy  9/27: Midodrine 
   09/25/24 1820   Care Plan - Respiratory Goals   Achieves optimal ventilation and oxygenation Assess for changes in respiratory status;Assess for changes in mentation and behavior;Position to facilitate oxygenation and minimize respiratory effort;Oxygen supplementation based on oxygen saturation or arterial blood gases;Encourage broncho-pulmonary hygiene including cough, deep breathe, incentive spirometry;Assess the need for suctioning and aspirate as needed;Assess and instruct to report shortness of breath or any respiratory difficulty;Respiratory therapy support as indicated       
  ICU PROGRESS NOTE        PATIENT NAME: Lauro Munroe  MEDICAL RECORD NO. [id number]  DATE: 2024    HD: # 5     rib fx (Rib 8, IS 1500),Central cord syndrome with BLE/BUE numbness/weakness    ACUTE DIAGNOSES/PLAN  Neuro:        -GCS: 15  -Pain: MMPT: Robaxin 750 QID, Gabapentin 900, Tylenol 1000, Toradol  -Neurosurgery following for central cord syndrome, spinal cord injury SARA C, level of injury C5    : OR for C3-C5 LAMINECTOMY, C6-C7 LAMINOTOMY, C3-C5 FUSION   -Maintain C-collar while extubated   -Hold all antiplatelets and anti coag   -MAP goal >85 for 7 days (end 10/1)        CV  -SBPs: 116-140  -MAP:   -HR: 63-85  - Midodrine 10mg TID    Pulm   Rib fx (Rib 8)       -R/A  -PIC score: 6  - Pull 750 on IS    GI/Nutrition  Diet regular  Bowel regimen: glycolax    Renal    -UOP: 2.8L (1.2cc/kg/hr)    -IVF: LR TKO    -BUN/Cr: 13/0.8    -Na/K: 137/4.4    -Mg/Ph: 1.7/3 Tx'd with Mg 2G    -Ion-Ca: 1.13    Heme  DVT prophylaxis lovenox   HGB: 12.8   Plt: 237    7.   Endocrine        B, not requiring coverage, goal < 180 & >80  -Insulin: not requiring     Musculoskeletal  Weight bearing status: Continue Cervical collar when out of bed               -PT/OT: will add Post OP                  Skin  Minor Abrasions on Left forehead and temporal head region    ID  -WBC: 6.8  -Antibiotic: none  -TDaP Given     Family/dispo  Remain in ICU for MAP push, PT/OT    Lines  PIV x3, R radial A-line,     CHECKLIST    CAM-ICU RASS: 0  RESTRAINTS: none  IVF: NS @ 100ml/hr  NUTRITION: Regular  ANTIBIOTICS: cefazolin   GI: Protonix   DVT: Enoxaparin  GLYCEMIC CONTROL: BS <180 & >80  HOB >45: yes  MOBILITY: bed rest  SBT: No  IS: encouraged  Wound care: as needed    Chief Complaint: \"Fall from height\"    SUBJECTIVE    Lauro Munroe is a 54 year old male that presented to the Emergency Department following fall from 10 feet. Patient had no LOC, -AC. Patient developed new onset numbness from the hips down. Complains 
  ICU PROGRESS NOTE        PATIENT NAME: Lauro Munroe  MEDICAL RECORD NO. [id number]  DATE: 2024    HD: # 3     rib fx (Rib 8, IS 1500),Central cord syndrome with BLE/BUE numbness/weakness    ACUTE DIAGNOSES/PLAN  Neuro:        -GCS: 15  -Pain: MMPT: Robaxin 750 QID, Gabapentin 600, Tylenol 1000, Toradol  -Neurosurgery following for central cord syndrome, spinal cord injury SARA C, level of injury C5    : OR for C3-C5 LAMINECTOMY, C6-C7 LAMINOTOMY, C3-C5 FUSION   -Maintain C-collar while extubated   -Maintain J Drain   -Hold all antiplatelets and anti coag   -MAP goal >85 for 7 days (end 10/1)        CV  -SBPs: 116-136  -MAP: 70-89  -HR: 70-82  -Pressors: Levo @ 4 for MAP push (MAP goal >85)    Pulm   Rib fx (Rib 8)          -Extubated  -PIC score:     GI/Nutrition  Diet regular  Bowel regimen: glycolax    Renal    -UOP: 2.8L (1.3cc/kg/hr)    -IVF: LR 0-75    -BUN/Cr: 13/0/8    -Na/K: 139/4.4    -Mg/P: 2/2.9    -Ion-Ca: 1.09 (replaced)    Heme  DVT prophylaxis-held    HGB: 12.5   Plt: 225    7.   Endocrine        B, not requiring coverage, goal < 180 & >80  -Insulin: not requiring     Musculoskeletal  Weight bearing status: Continue Cervical collar when out of bed               -PT/OT: will add Post OP                  Skin  Minor Abrasions on Left forehead and temporal head region    Micro  -WBC: 9.2  -Antibiotic: none  -TDaP Given     Family/dispo  Remain in ICU for MAP push, PT/OT    Lines  PIV x3, R radial A-line, Urinary catheter, Closed/ suction drain posterior neck.      CHECKLIST    CAM-ICU RASS: 0  RESTRAINTS: none  IVF: NS @ 100ml/hr  NUTRITION: NPO  ANTIBIOTICS: cefazolin   GI: Protonix   DVT: held   GLYCEMIC CONTROL: BS <180 & >80  HOB >45: yes  MOBILITY: bed rest  SBT: No  IS: encouraged  Wound care: as needed    Chief Complaint: \"Fall from height\"    SUBJECTIVE    Lauro Munroe is a 54 year old male that presented to the Emergency Department following fall from 10 feet. Patient had 
  ICU PROGRESS NOTE        PATIENT NAME: Lauro Munroe  MEDICAL RECORD NO. [id number]  DATE: 2024    HD: # 6     rib fx (Rib 8, IS 1500),Central cord syndrome with BLE/BUE numbness/weakness    ACUTE DIAGNOSES/PLAN  Neuro:        -GCS: 15  -Pain: MMPT: tylenol 1G Q8, Robaxin 750 Q8, Gabapentin 900 Q8, Tylenol 1000, Roxycodone 5 Q4 PRN  -Neurosurgery following for central cord syndrome, spinal cord injury SARA C, level of injury C5    : OR for C3-C5 LAMINECTOMY, C6-C7 LAMINOTOMY, C3-C5 FUSION   -Maintain C-collar while extubated   -Hold all antiplatelets and anti coag   -MAP goal >85 for 7 days (end 10/1)        CV  -SBPs: 124-139  -MAP:   -HR: 70-82  - Midodrine 10mg TID    Pulm   Rib fx (Rib 8), L PTX- resolved    -R/A  -PIC score: 8 (2-3-3)  - Pull 750 on IS    GI/Nutrition  Diet regular  Bowel regimen: glycolax    Renal    -UOP: 4.4L (1.9 cc/kg/hr)    -IVF: LR TKO    -BUN/Cr: 13/0.8    -Na/K: 134/4.6    -Mg/Ph: 1.9/3.3 (Tx'd with Mg 2G)    -Ion-Ca: 1.13    Heme  DVT prophylaxis lovenox   HGB: 12.8   Plt: 243    7.   Endocrine        B, not requiring coverage, goal < 180 & >80  -Insulin: not requiring     Musculoskeletal  Weight bearing status: Continue Cervical collar when out of bed               -PT/OT: will add Post OP  -   Per PMR: Rehab recommendation: Disposition recommendation follow therapy evaluations however current ly considering significant weakness upper and lower extremity, may need to consider specialty spinal cord rehab center, will follow progress                Skin  Minor Abrasions on Left forehead and temporal head region    ID  -WBC: 7  -Antibiotic: none  -TDaP Given     Family/dispo  Remain in ICU for MAP push, Possible transfer to floor tomorrow     Lines  PIV x3, R radial A-line,     CHECKLIST    CAM-ICU RASS: 0  RESTRAINTS: none  IVF: NS @ 100ml/hr  NUTRITION: Regular  ANTIBIOTICS: cefazolin   GI: Protonix   DVT: Enoxaparin  GLYCEMIC CONTROL: BS <180 & >80  HOB 
  ICU PROGRESS NOTE        PATIENT NAME: Lauro Munroe  MEDICAL RECORD NO. [id number]  DATE: 9/25/2024    HD: # 1    ACUTE DIAGNOSES/PLAN  Neuro:  Pain: MMPT: Robaxin 750 QID, Gabapentin 300mg, Tylenol 1000,   Neurosurgery following    -spinal cord injury SARA C, level of injury C5    -Recommend C3-7 decompression and fusion,possible to T1    -Maintain C-collar   -NPO    -Neuro checks per protocol    -Hold all antiplatelets and anti coag   -SBP <140     Neuro deficits overall stable. Bilateral upper ext weakness. R arm weakness worse. Can wiggle R toe but barely any movement of left.       Plan for OR with neurosurgery today.   Will check on post op.     CV  -SBPs: 110-140's  -MAP: >85  -HR: 60-80's  -Pressors: None  Pulm         - 2L NC         - No O2 at baseline          - CXR Showed no pneumothorax this AM    - IS: 2000  PIC- 10  No pneumothorax on CXR.  Will need repeat CXR post op.     GI/Nutrition  Diet NPO  Bowel regimen: glycolax, senokot   Renal/lytes   -I/O since admit: -810   -q24hrs UOP: 1100 ml (1 cc/kg/hr)     -IVF: LR @ 125 ml/hr    -BUN/Cr: 14/0.9    -Na/K: 138/4.7  Heme  DVT prophylaxis-held    HGB: 13.4   Plt: 229  7.   Endocrine        BG: Stable, not requiring coverage, goal < 180 & >80  -Insulin: not requiring   Musculoskeletal  Weight bearing status: Bed rest per Neuro               -PT/OT: will add Post OP              # Diagnosis: Fall   No bone Fractures   Skin  Minor Abrasions on Left forehead and temporal head region  Micro  WBC: 6.5 (5.3)  BC: none  TDaP Given 9/24  Family/dispo  Remain in ICU   Lines  PIV x3     CHECKLIST    CAM-ICU RASS: 0  RESTRAINTS: none  IVF: LR @ 125ml/hr  NUTRITION: NPO  ANTIBIOTICS: 1 dose of cefazolin 9/24  GI: Protonix   DVT: held   GLYCEMIC CONTROL: BS <180 & >80  HOB >45: yes  MOBILITY: bed rest  SBT: not intubed  IS: encouraged; 2000 today  Wound care: as needed    Chief Complaint: \"Fall from height\"    SUBJECTIVE    Lauro Munroe is a 54 year old male that 
  Physician Progress Note      PATIENT:               TANIA CASH  CSN #:                  [id number]  :                       1970  ADMIT DATE:       2024 10:18 AM  DISCH DATE:  RESPONDING  PROVIDER #:        Elham Cam CNP          QUERY TEXT:    Pt admitted with  fall with spinal cord injury SARA C, level of injury C5.  Pt   noted to have been started on O2 NC then put on a nonrebreather at 15L/min   and subsequently intubated and placed on vent. If possible, please document in   the progress notes and discharge summary if you are evaluating and/or   treating any of the following:    The medical record reflects the following:  Risk Factors: Fall from 10 feet, Moderate spinal stenosis c3-4 and C4-5,   central cord syndrome  Clinical Indicators: on arrival VBG's; pH7.454/pCO2 36.1,  on  ABG's; pH   7.503,  pCO2 33.0, pO2 143.2, HCO3 25.9,  POS base ex 3.0,  SpO2 89  ,RR   21>22, CT;  Moderate size left pneumothorax.  CXR; Patient's known left   pneumothorax and left rib fractures are more conspicuous  on the earlier CT exam.  CXR; New focal area of nonspecific soft tissue   emphysema along the left lateral  chest wall. No pneumothorax is appreciated.  Treatment: NC 2L/min-> non rebreather mask O2 15L/min> intubated  at 1405   on vent FiO2 50->100>50    Thank you, Please epic in box message if questions  Anaya Siu RN CDS  Options provided:  -- Acute respiratory failure with hypoxia  -- Other - I will add my own diagnosis  -- Disagree - Not applicable / Not valid  -- Disagree - Clinically unable to determine / Unknown  -- Refer to Clinical Documentation Reviewer    PROVIDER RESPONSE TEXT:    This patient is in acute respiratory failure with hypoxia.    Query created by: Anaya Madrid on 2024 7:07 AM      Electronically signed by:  Elham Cam CNP 2024 8:18 AM          
  [facility]  Occupational Therapy Not Seen Note    DATE: 2024    NAME: Lauro Munroe  MRN: [id number]   : 1970      Patient not seen this date for Occupational Therapy due to:    Surgery/Procedure: Pt to OR for C3-7 decompression and fusion,possible to T1. OT will check back post op for needs.    Next Scheduled Treatment:       Electronically signed by LIVIA Pepper on 2024 at 3:10 PM    
  Trauma Tertiary Survey    Admit Date: 9/24/2024  Hospital day 1      Subjective:     Patient seen and examined this morning. No acute o/n events. Patient states he is having pain in the neck. He is currently NPO for OR with neurosurgery today.     Objective:   PHYSICAL EXAM:   Physical Exam  Constitutional:       General: He is not in acute distress.     Appearance: Normal appearance. He is not ill-appearing.   Cardiovascular:      Rate and Rhythm: Normal rate.   Pulmonary:      Effort: Pulmonary effort is normal.   Abdominal:      General: Abdomen is flat. There is no distension.      Palpations: Abdomen is soft.      Tenderness: There is no abdominal tenderness.   Skin:     General: Skin is cool.      Findings: No wound.   Neurological:      General: No focal deficit present.      Mental Status: He is alert and oriented to person, place, and time.     Spine:     Spine Tenderness ROM   Cervical 5 /10 Abnormal, BUE weakness, R>L, BLE weakness L>R   Thoracic 0 /10 Normal   Lumbar 0 /10 Normal     Musculoskeletal    Joint Tenderness Swelling ROM   Right shoulder Present, imaged in ED absent normal   Left shoulder absent absent normal   Right elbow absent absent normal   Left elbow absent absent normal   Right wrist absent absent normal   Left wrist present absent normal   Right hand grasp Unable to grasp d/t central cord absent normal   Left hand grasp Unable to grasp d/t central cord absent normal   Right hip absent absent normal   Left hip present absent normal   Right knee absent absent normal   Left knee absent absent normal   Right ankle absent absent normal   Left ankle absent absent normal   Right foot absent absent normal   Left foot absent absent normal       CONSULTS: neurosurgery    PROCEDURES:      [x] Reviewed radiology reports  (All radiology findings correlate to diagnoses/problem list)  30% anterolateral L ptx  Nondisplaced L anterior rib fx's 2-5  Moderate spinal stenosis c3-4 and C4-5, central cord 
CRNA called to bedside for worsening air leak and pt having increased oral secretions around ETT, after speaking with trauma and neurosurgery. CRNA at bedside, called anesthesiology to bedside to exchange tube.    0125: 100 of succs given, 100 of propofol given by CRNA. Anesthesiology placed Glidescope into mouth and saw the ETT cuff herniating up and out of glottic opening. Cuff was than deflated and advanced the ETT to 26 at gums. CXR ordered.   
Comprehensive Nutrition Assessment    Type and Reason for Visit:  Initial, RD Nutrition Re-Screen/LOS    Nutrition Recommendations/Plan:   Feeding assistance at meals  Continue regular diet  Monitor intakes, ONS, weight, labs, GI status.     Malnutrition Assessment:  Malnutrition Status:  Insufficient data (10/02/24 1330)    Context:  Acute Illness     Findings of the 6 clinical characteristics of malnutrition:  Energy Intake:  No significant decrease in energy intake  Weight Loss:  No significant weight loss     Body Fat Loss:  Unable to assess     Muscle Mass Loss:  Unable to assess    Fluid Accumulation:  Mild Extremities   Strength:  Not Performed    Nutrition Assessment:    55 yo M s/p fall, adm central cord syndrome. No weight hx available. Pt seen for LOS, transfer to stepdown. On regular diet, needs feeding assistance. Discussed with RN. Writer assisted with breakfast, ate 85% of meal. Pt reports good appetite and intake at meals. States family is helping with meals when visiting. Sister to come in later today.    Nutrition Related Findings:    Meds/labs reviewed. No BM recorded (?) Wound Type:  (Traumatic wounds to head, face.)       Current Nutrition Intake & Therapies:    Average Meal Intake: %  Average Supplements Intake: None Ordered  ADULT DIET; Regular  Additional Calorie Sources:  None    Anthropometric Measures:  Height: 188 cm (6' 2.02\")  Ideal Body Weight (IBW): 190 lbs (86 kg)    Current Body Weight: 95.3 kg (210 lb 1.6 oz), 110.6 % IBW. Weight Source: Not Specified  Current BMI (kg/m2): 27  BMI Categories: Overweight (BMI 25.0-29.9)    Estimated Daily Nutrient Needs:  Energy Requirements Based On: Kcal/kg  Weight Used for Energy Requirements: Current  Energy (kcal/day): 2438-8762 kcal/d  Weight Used for Protein Requirements: Ideal  Protein (g/day): 110-120 g/d  Method Used for Fluid Requirements: 1 ml/kcal  Fluid (ml/day): or per MD    Nutrition Diagnosis:   Other (Comment) 
Neurosurgery CHANDRAKANT/Resident    Daily Progress Note   CC:  Chief Complaint   Patient presents with    Fall     9/30/2024  10:57 AM    Chart reviewed.  No acute events overnight.  No new complaints. Resting in bed, reports improvement in the strength in his legs since accident     Vitals:    09/30/24 0700 09/30/24 0800 09/30/24 0807 09/30/24 0900   BP: 125/80 133/81  (!) 133/95   Pulse: 82 74  91   Resp: 14 11 14 19   Temp:  98.8 °F (37.1 °C)     TempSrc:  Oral     SpO2: 97% 96%  97%   Weight:       Height:           PE:       AOx3   PERRL, EOMI      Motor   L deltoid 1-2/5; R deltoid 0/5  L biceps 4/5; R biceps 3/5  L triceps 2/5; R triceps 0/5  L wrist extension 1/5; R wrist extension 0/5  L intrinsics 0/5; R intrinsics 0/5      L iliopsoas 3+/5 , R iliopsoas 5/5  L quadriceps 3/5; R quadriceps 5/5  L Dorsiflexion 2-3/5; R dorsiflexion 4/5  L Plantarflexion 0/5; R plantarflexion 4/5      Sensation: numbness and dysesthesias      Incision: CDI      Lab Results   Component Value Date    WBC 7.0 09/30/2024    HGB 12.9 (L) 09/30/2024    HCT 39.1 (L) 09/30/2024     09/30/2024     09/30/2024    K 4.6 09/30/2024     09/30/2024    CREATININE 0.8 09/30/2024    BUN 13 09/30/2024    CO2 25 09/30/2024    INR 0.9 09/24/2024       A/P  54 y.o. male who presents with spinal cord injury, with mixed central cord pattern. SARA C.   POD#5 s/p C3- C5 laminectomy, C6-C7 laminotomy, C3-C5 fusion.                 - Maintain surgical dressing              - MAP goal > 85 x 7 days (10/1)              - Continue Cervical collar when out of bed or when sitting upright  - Lovenox for DVT prophylaxis   - Pain control per Trauma   - Neuro checks per floor protocol  - Activity as tolerated, continue PT/OT  - patient can follow up in 2 weeks for post op wound check    Please contact neurosurgery with any changes in patients neurologic status.       Wallace Rai CNP  9/30/24  10:57 AM         
Neurosurgery CHANDRAKANT/Resident    Daily Progress Note   Chief Complaint   Patient presents with    Fall     9/25/2024  9:45 AM    Chart reviewed.  No acute events overnight.  No new complaints. Continues to have neck pain. Cervical collar intact. Surgical consent done with patient.     Vitals:    09/25/24 0845 09/25/24 0900 09/25/24 0915 09/25/24 0930   BP: 118/71 119/73 124/75 123/73   Pulse: 76 79 83 74   Resp: 16 16 20 13   Temp:       TempSrc:       SpO2:       Weight:       Height:             PE:   AOx3   Motor   Bilat UE limited by pain   L deltoid 4/5; R deltoid 0/5  L biceps 4/5; R biceps 0/5  L triceps 2/5; R triceps 0/5  L wrist extension 1/5; R wrist extension 0/5  L intrinsics 1/5; R intrinsics 0/5      Moves 1st toe on left foot otherwise no movement   R iliopsoas 4/5  R quadriceps 4/5  R Dorsiflexion 4/5  R Plantarflexion 4/5  R EHL 4/5    Sensation dysesthesias bilat UE      Lab Results   Component Value Date    WBC 6.5 09/25/2024    HGB 13.4 09/25/2024    HCT 41.3 09/25/2024     09/25/2024     09/25/2024    K 4.7 09/25/2024     09/25/2024    CREATININE 0.9 09/25/2024    BUN 14 09/25/2024    CO2 26 09/25/2024    INR 0.9 09/24/2024       Radiology   MRI CERVICAL SPINE WO CONTRAST    Result Date: 9/24/2024  EXAMINATION: MRI OF THE CERVICAL SPINE WITHOUT CONTRAST 9/24/2024 12:36 pm TECHNIQUE: Multiplanar multisequence MRI of the cervical spine was performed without the administration of intravenous contrast. COMPARISON: None. HISTORY: ORDERING SYSTEM PROVIDED HISTORY: Evaluate spinal cord compression TECHNOLOGIST PROVIDED HISTORY: Evaluate spinal cord compression What is the sedation requirement?->Sedation Reason for Exam: Evaluate spinal cord compression FINDINGS: Motion degrades images limiting evaluation. BONES/ALIGNMENT: The vertebral body heights appear maintained.  There is mild retrolisthesis at C3-C4 and C4-C5.  There Is loss of disc space height with endplate irregularity at C3-C4 
Neurosurgery CHANDRAKANT/Resident    Daily Progress Note   Chief Complaint   Patient presents with    Fall     9/26/2024  6:38 AM    Chart reviewed.  Patient awake and nodding head appropriately on ventilator. Nods \"yes\" to pain. Posterior cervical dressing intact. ADRIA drain patent.     Vitals:    09/26/24 0412 09/26/24 0500 09/26/24 0549 09/26/24 0600   BP:       Pulse:  85 84 82   Resp: 16 16 16 16   Temp:    98.8 °F (37.1 °C)   TempSrc:       SpO2:  100% 100% 100%   Weight:       Height:             PE:   Intubated on mechanical ventilator   E4 V1T M6  Motor   L deltoid 4/5; R deltoid 0/5  L biceps 4/5; R biceps 3/5  L triceps 3/5; R triceps 0/5  L wrist extension 1/5; R wrist extension 0/5  L intrinsics 1/5;  moving right thumb      L iliopsoas 3/5 , R iliopsoas 4/5  L quadriceps 3/5; R quadriceps 4/5  L Dorsiflexion 0/5; R dorsiflexion 4/5  L Plantarflexion 0/5; R plantarflexion 4/5  Wiggled toes bilaterally     Sensation- limited exam due to intubation     Drain output 80 ml since surgery   Incision surgical dressing, C/D/I       Lab Results   Component Value Date    WBC 6.6 09/26/2024    HGB 12.0 (L) 09/26/2024    HCT 35.6 (L) 09/26/2024     09/26/2024     09/26/2024    K 4.2 09/26/2024     09/26/2024    CREATININE 0.9 09/26/2024    BUN 14 09/26/2024    CO2 23 09/26/2024    INR 0.9 09/24/2024       Radiology   XR ABDOMEN FOR NG/OG/NE TUBE PLACEMENT    Result Date: 9/26/2024  EXAMINATION: ONE SUPINE XRAY VIEW(S) OF THE ABDOMEN; ONE XRAY VIEW OF THE CHEST 9/26/2024 1:42 am COMPARISON: 09/25/2024 HISTORY: ORDERING SYSTEM PROVIDED HISTORY: Confirmation of course of NG/OG/NE tube and location of tip of tube TECHNOLOGIST PROVIDED HISTORY: Confirmation of course of NG/OG/NE tube and location of tip of tube Portable?->Yes; ORDERING SYSTEM PROVIDED HISTORY: Ett advanced TECHNOLOGIST PROVIDED HISTORY: Ett advanced FINDINGS: Chest: The endotracheal tube measures 6.4 cm above the raul.  The enteric catheter 
Neurosurgery CHANDRAKANT/Resident    Daily Progress Note   Chief Complaint   Patient presents with    Fall     9/28/2024  7:07 AM    Chart reviewed.  No acute events overnight.  No new complaints. Vitals and labs stable. PM&R following. ADRIA drain removed yesterday. Patient is complaining of severe spasms overnight.     Vitals:    09/28/24 0604 09/28/24 0615 09/28/24 0630 09/28/24 0645   BP:       Pulse:  72 65 64   Resp: 15 14 13 12   Temp:       TempSrc:       SpO2:  98% 96% 91%   Weight:       Height:             PE:   AO x 3  Motor   L deltoid 2/5; R deltoid 0/5  L biceps 3/5; R biceps 2/5  L triceps 3/5; R triceps 0/5  L wrist extension 1/5; R wrist extension 0/5  L intrinsics 1/5;  R moving right thumb      L iliopsoas 2/5 , R iliopsoas 2/5  L quadriceps 2/5; R quadriceps 2/5  L Dorsiflexion 0/5; R dorsiflexion 3/5  L Plantarflexion 0/5; R plantarflexion 3/5  Wiggled toes bilaterally      Sensation- intact, hyperesthesia in the BUE   Incision: CDI      Lab Results   Component Value Date    WBC 7.1 09/28/2024    HGB 12.4 (L) 09/28/2024    HCT 37.0 (L) 09/28/2024     09/28/2024     09/28/2024    K 4.5 09/28/2024     09/28/2024    CREATININE 0.7 09/28/2024    BUN 9 09/28/2024    CO2 26 09/28/2024    INR 0.9 09/24/2024       A/P  54 y.o. male who presents with spinal cord injury, with mixed central cord pattern. SARA C.   POD 3 s/p C3- C5 laminectomy, C6-C7 laminotomy, C3-C5 fusion.                 - Maintain surgical dressing              - MAP goal > 85 x 7 days (10/1)              - Continue Cervical collar when out of bed   - Lovenox for DVT prophylaxis   - Pain control per Trauma   - Neuro checks per floor protocol  - Activity as tolerated, continue PT/OT      Please contact neurosurgery with any changes in patients neurologic status.       Electronically signed by SANDRA Cerna - CNP on 9/28/2024 at 7:09 AM        
Neurosurgery CHANDRAKANT/Resident    Daily Progress Note   Chief Complaint   Patient presents with    Fall     9/29/2024  6:48 AM    Chart reviewed.  No acute events overnight.  No new complaints. Labs and vitals stable. Has been working with therapy. Pending placement. Patient does have some questions regarding regaining his mobility    Vitals:    09/29/24 0545 09/29/24 0548 09/29/24 0600 09/29/24 0615   BP:       Pulse: 69  70 66   Resp: 14 14 10 (!) 8   Temp:       TempSrc:       SpO2: 98%  95% 95%   Weight:       Height:             PE:   AO x 3  Motor   L deltoid 2/5; R deltoid 0/5  L biceps 3/5; R biceps 2/5  L triceps 3/5; R triceps 0/5  L wrist extension 1/5; R wrist extension 0/5  L intrinsics 1/5;  R moving right thumb      L iliopsoas 2/5 , R iliopsoas 2/5  L quadriceps 2/5; R quadriceps 2/5  L Dorsiflexion 0/5; R dorsiflexion 3/5  L Plantarflexion 0/5; R plantarflexion 3/5  Wiggled toes bilaterally      Sensation- intact, hyperesthesia in the BUE   Incision: CDI      Lab Results   Component Value Date    WBC 6.8 09/29/2024    HGB 12.8 (L) 09/29/2024    HCT 37.6 (L) 09/29/2024     09/29/2024     09/29/2024    K 4.4 09/29/2024     09/29/2024    CREATININE 0.8 09/29/2024    BUN 13 09/29/2024    CO2 25 09/29/2024    INR 0.9 09/24/2024       A/P  54 y.o. male who presents with spinal cord injury, with mixed central cord pattern. SARA C.   POD 4 s/p C3- C5 laminectomy, C6-C7 laminotomy, C3-C5 fusion.                 - Maintain surgical dressing              - MAP goal > 85 x 7 days (10/1)              - Continue Cervical collar when out of bed or when sitting upright  - Lovenox for DVT prophylaxis   - Pain control per Trauma   - Neuro checks per floor protocol  - Activity as tolerated, continue PT/OT      Please contact neurosurgery with any changes in patients neurologic status.       Electronically signed by SANDRA Cerna CNP on 9/29/2024 at 6:49 AM        
Neurosurgery and trauma made aware of Maps sustaining below 85 when pt is asleep. Low dose levophed started   
Occupational Therapy  Facility/Department: [facility] CAR 1- SICU   Daily Treatment Note  Patient Name: Lauro Munroe        MRN: [id number]    : 1970    Date of Service: 2024    Discharge Recommendations  Discharge Recommendations: Patient would benefit from continued therapy after discharge  Further therapy recommended at discharge.The patient should be able to tolerate at least 3 hours of therapy per day over 5 days or 15 hours over 7 days.   This patient may benefit from a Physical Medicine and Rehab consult.      OT Equipment Recommendations  Mobility Devices: ADL Assistive Devices  ADL Assistive Devices: Feeding Devices (Built up utensils)  Other: CTA as pt progresses.    Assessment  Performance deficits / Impairments: Decreased functional mobility ;Decreased safe awareness;Decreased balance;Decreased coordination;Decreased ADL status;Decreased ROM;Decreased endurance;Decreased high-level IADLs;Decreased strength;Decreased fine motor control  Assessment: Pt is typically IND with ADL care, functional transfers/mobility prior to hospital admission. Pt would benefit from continued skilled OT to address above deficits to increase independence with ADL/IADLs and to promote overall occupational performance.   Prognosis: Good  REQUIRES OT FOLLOW-UP: Yes  Activity Tolerance  Activity Tolerance: Patient Tolerated treatment well;Patient limited by pain;Patient limited by fatigue  Safety Devices  Type of Devices: All fall risk precautions in place;Patient at risk for falls;Left in bed;Call light within reach  Restraints  Restraints Initially in Place: No    Restrictions/Precautions  Restrictions/Precautions  Restrictions/Precautions: Surgical Protocols;Fall Risk  Required Braces or Orthoses?: Yes  Required Braces or Orthoses  Cervical: c-collar  Position Activity Restriction  Other position/activity restrictions: HOB 30 degrees; Ambulate patient; Activity as tolerated; Fall from 10ft spinal cord injury, with mixed 
Occupational Therapy  Facility/Department: [facility] CAR 1- SICU   Daily Treatment Note  Patient Name: Lauro Munroe        MRN: [id number]    : 1970    Date of Service: 10/1/2024  Chief Complaint   Patient presents with    Fall       Discharge Recommendations Further therapy recommended at discharge.The patient should be able to tolerate at least 3 hours of therapy per day over 5 days or 15 hours over 7 days.   This patient may benefit from a Physical Medicine and Rehab consult.   Discharge Recommendations: Patient would benefit from continued therapy after discharge         Assessment  Performance deficits / Impairments: Decreased functional mobility ;Decreased balance;Decreased coordination;Decreased ADL status;Decreased ROM;Decreased endurance;Decreased high-level IADLs;Decreased strength;Decreased fine motor control  Prognosis: Good  REQUIRES OT FOLLOW-UP: Yes  Activity Tolerance  Activity Tolerance: Patient Tolerated treatment well;Patient limited by pain;Patient limited by fatigue  Safety Devices  Type of Devices: Patient at risk for falls;Left in bed;Call light within reach;Gait belt  Restraints  Restraints Initially in Place: No    Restrictions/Precautions  Restrictions/Precautions  Restrictions/Precautions: Surgical Protocols;Fall Risk;General Precautions  Required Braces or Orthoses?: Yes  Required Braces or Orthoses  Cervical: c-collar  Position Activity Restriction  Other position/activity restrictions: HOB 30 degrees; Ambulate patient; Activity as tolerated; Fall from 10ft spinal cord injury, with mixed central cord pattern. SARA C. S/p C3- C5 laminectomy, C6-C7 laminotomy, C3-C5 fusion.    Subjective  General  Patient assessed for rehabilitation services?: Yes  Family / Caregiver Present: No  Subjective  Subjective: RN approved therapy session, pt states having pain in hands (hypersensitive) but no numbers given, pt very cooperative    Objective  Orientation  Overall Orientation Status: Within 
Order obtained for extubation.  SpO2 of 100 on 40% FiO2.   Patient extubated and placed on 3 liters/min via nasal cannula.   Post extubation SpO2 is 100% with HR  71 bpm and RR 21 breaths/min.    Patient had mild cough that was non-productive.  Extubation Well tolerated by patient..   Breath Sounds: clear    TRACEY LYONS RCP   10:57 AM  
PIC Score  IS Goal Volume (15ml/kg IBW): 1293   Pain  Patient reported 1-10 scale Inspiration  Incentive Spirometer    Volume obtained: BRET ml Cough  Assessed by nurse     3  Mild  Pain intensity scale 0-4    [] 4  Above goal volume  []   3  Strong    []    3  Goal to alert volume  []    2  Moderate  Pain intensity scale 5-7  [x] 2  Below alert volume    [x] 2   Weak    [x]   1  Severe   Pain intensity scale 8-10  [] 1  Unable to perform incentive spirometry    [] 1  Absent      []         Total: 6       Unable to obtain accurate reading due to IS in use.  
Physical Therapy  Facility/Department: [facility] CAR 1- SICU  Physical Therapy Initial Assessment    Name: Lauro Munroe  : 1970  MRN: [id number]  Date of Service: 2024    Lauro Munroe is a 54 year old male that presented to the Emergency Department following fall from 10 feet. Patient had no LOC, -AC. Patient developed new onset numbness from the hips down. Complains of hip/pelvis pain. He is unable to move his left foot at the ankle, but can move his knee and left hip. He was noted to have an absent left pedal pulse, per EMS. He was given 100 mcg fentanyl for pain prior to arrival. In the trauma bay.  Pt underwent C3-C5 LAMINECTOMY, C6-C7 LAMINOTOMY, C3-C5 FUSION 24    Discharge Recommendations:  Further therapy recommended at discharge.        PT Equipment Recommendations  Equipment Needed: No (defer equipment recommendations to rehab facility)      Patient Diagnosis(es): The primary encounter diagnosis was Fall, initial encounter. Diagnoses of Traumatic pneumothorax, initial encounter, Weakness of both upper extremities, Weakness of left lower extremity, and Spinal cord injury at C5-C7 level without injury of spinal bone, initial encounter (HCC) were also pertinent to this visit.  Past Medical History:  has no past medical history on file.  Past Surgical History:  has a past surgical history that includes cervical laminectomy (2024) and cervical fusion (N/A, 2024).    Assessment  Pt cooperative, motivated, concerned re: prognosis for return to functional independence.  Bed mobility dep+2; able to dangle EOB ~15 minutes initially with mod A, progressed to CG/SBA+1. Worked on A/P trunk control and lateral wt shift R/L with CG through small ANIKA. Didn't attempt standing this date d/t muscle strength B UEs and LLE. Pt is currently unsafe for home DC and is expected to benefit from aggressive therapies to improve his strength and functional independence.   Body Structures, Functions, Activity 
SPIRITUAL HEALTH  [facility]  Emergency/Trauma Note    PATIENT NAME: Mt Trauma Mil (Lauro Munroe [birth year])    Shift date: 9.24.2024  Shift day: Tuesday   Shift # 1    Room # TRAUMA A/TRAUMAA   Name: Dq Trauma Xxdesmoines            Age: 89 y.o.  Gender: male          Language: English   Anabaptism: Non-Church   Principal Problem: Central cord syndrome, subsequent encounter (HCC)     Trauma/Incident type: Adult Trauma Alert    Admit Date & Time: 9/24/2024 10:18 AM  TRAUMA NAME: RUTH    ADVANCE DIRECTIVES IN CHART?  No    NAME OF DECISION MAKER: None    RELATIONSHIP OF DECISION MAKER TO PATIENT: None     EMERGENCY CONTACTS IN CHART:  Extended Emergency Contact Information  Primary Emergency Contact: Nicole Munroe  Mobile Phone: [phone]  Relation: Brother/Sister  Preferred language: English   needed? No       PATIENT/EVENT DESCRIPTION:  Mt Jaeger is a 89 y.o. male who arrived via EMT  from Scene of the Accident  as a Adult Trauma Alert  due to Fall. Pt to be admitted to TRAUMA A/TRAUMAA.         SPIRITUAL ASSESSMENT:  Patient Assessment:  Patient appears to show low distress at this time appearing Calm and Coping.    Nurse informed  that patient would like Nicole Munroe contacted.   was able to make contact with Nicole.  She is currently working on the other side of [facility] but would like patient to call when able.      Perceived Needs Assessment:  Assistance in finding family or support system.    Relational Resources appear average. Relational support includes sister .      Emotional Resources appear average. Emotional support includes sister.      Spiritual Resources appear unknown at this time.     Support Assessment:   Sister appears to show medium distress appearing Concerned and Coping with an immediate hope of getting a medical updatae with an ultimate hope of speaking with the patient when able.       INTERVENTION:   Provided 
Upon initial assessment of pt. Writer noticed what sounded like significant cuff leak as well as pt. Coughing up significant secretions around the tube into mouth. Trauma at bedside, stat chest X-ray obtained. Anesthesiology at bedside to assess ETT. Per anesthesiology there is no further intervention needed at this time.   
Writer called Leticia, gave report to ALFREDO Etienne's taken out, all questions asked and answered, pt left with all belongings and pt picked up by transport and discharged.   
>80  HOB >45: yes  MOBILITY: bed rest  SBT: No  IS: encouraged  Wound care: as needed    Chief Complaint: \"Fall from height\"    SUBJECTIVE    Lauro Munroe is a 54 year old male that presented to the Emergency Department following fall from 10 feet. Patient had no LOC, -AC. Patient developed new onset numbness from the hips down. Complains of hip/pelvis pain. He is unable to move his left foot at the ankle, but can move his knee and left hip. He was noted to have an absent left pedal pulse, per EMS. He was given 100 mcg fentanyl for pain prior to arrival. In the trauma bay, the patient reports inability to move his left foot and bilateral wrists.     Overnight Events: Pt seen and examined at bedside.  Will work with PT/OT today. Transfer order placed to [facility].    OBJECTIVE  VITALS:   Vitals:    10/01/24 1400   BP: 121/71   Pulse: 69   Resp: 13   Temp: 97.9 °F (36.6 °C)   SpO2: 97%     Physical Exam  Constitutional:       Appearance: Normal appearance. He is normal weight.   HENT:      Head: Normocephalic.      Comments: Minor Abrasion on forehead      Mouth/Throat:      Mouth: Mucous membranes are moist.      Pharynx: Oropharynx is clear.   Eyes:      Extraocular Movements: Extraocular movements intact.   Neck:      Comments: Pt in C-collar  Cardiovascular:      Rate and Rhythm: Normal rate and regular rhythm.      Pulses: Normal pulses.      Heart sounds: Normal heart sounds.   Pulmonary:      Effort: Pulmonary effort is normal.      Breath sounds: Normal breath sounds.   Abdominal:      General: Bowel sounds are normal. There is no distension.      Palpations: Abdomen is soft.      Tenderness: There is no abdominal tenderness. There is no guarding.   Musculoskeletal:         General: Normal range of motion.      Comments: Muscle Weakness in Bilateral upper body extremity and L Lower body extremity   Neurological:      Motor: Weakness present most notably to LLE but CARRASCO.      Comments: Findings prior to 
developed new onset numbness from the hips down. Complains of hip/pelvis pain. He is unable to move his left foot at the ankle, but can move his knee and left hip. He was noted to have an absent left pedal pulse, per EMS. He was given 100 mcg fentanyl for pain prior to arrival. In the trauma bay, the patient reports inability to move his left foot and bilateral wrists.     Overnight Events: Pt seen and examined at bedside.  Will work with PT/OT today.    OBJECTIVE  VITALS:   Vitals:    09/28/24 0604   BP:    Pulse:    Resp: 15   Temp:    SpO2:        Physical Exam  Constitutional:       Appearance: Normal appearance. He is normal weight.   HENT:      Head: Normocephalic.      Comments: Minor Abrasion on forehead      Mouth/Throat:      Mouth: Mucous membranes are moist.      Pharynx: Oropharynx is clear.   Eyes:      Extraocular Movements: Extraocular movements intact.   Neck:      Comments: Pt in C-collar  Cardiovascular:      Rate and Rhythm: Normal rate and regular rhythm.      Pulses: Normal pulses.      Heart sounds: Normal heart sounds.   Pulmonary:      Effort: Pulmonary effort is normal.      Breath sounds: Normal breath sounds.   Abdominal:      General: Bowel sounds are normal. There is no distension.      Palpations: Abdomen is soft.      Tenderness: There is no abdominal tenderness. There is no guarding.   Musculoskeletal:         General: Normal range of motion.      Comments: Muscle Weakness in Bilateral upper body extremity and L Lower body extremity   Neurological:      Motor: Weakness present.      Comments: Findings prior to intubation: Left deltoid 4/5, triceps 2, hand 1, right limited by pain, deltoid 1/5, biceps 2, triceps 2, hand 1, right LE minimal movement, left knee extension 4/5, hip flexion 3/5.  Patient is currently intubated                 LAB:  CBC:   Recent Labs     09/26/24  0528 09/27/24  0428 09/28/24  0433   WBC 6.6 9.2 7.1   HGB 12.0* 12.5* 12.4*   HCT 35.6* 37.5* 37.0*   MCV 
training, Patient/Caregiver education & training, Equipment evaluation, education, & procurement, Positioning, Therapeutic activities  Safety Devices  Type of Devices: All fall risk precautions in place, Patient at risk for falls, Left in bed  Restraints  Restraints Initially in Place: No    Restrictions  Restrictions/Precautions  Restrictions/Precautions: Surgical Protocols, Fall Risk  Required Braces or Orthoses?: Yes  Required Braces or Orthoses  Cervical: c-collar  Position Activity Restriction  Other position/activity restrictions: HOB 30 degrees; Ambulate patient; Activity as tolerated; Fall from 10ft spinal cord injury, with mixed central cord pattern. SARA C. S/p C3- C5 laminectomy, C6-C7 laminotomy, C3-C5 fusion.     Subjective  General  Chart Reviewed: Yes  Patient assessed for rehabilitation services?: Yes  Response To Previous Treatment: Patient with no complaints from previous session.  Family / Caregiver Present: No  Follows Commands: Within Functional Limits  General Comment  Comments: Upon arrival pt in bed  Subjective  Subjective: pt agreeable to participate with therapy. pt reports 6.5/10 pain in bilat shoulders & pain radiating from fingers to elbow in bilat UE. pt received pain medication during therapy.        Cognition   Orientation  Overall Orientation Status: Within Functional Limits  Orientation Level: Oriented X4    Objective     Bed mobility  Rolling to Left: Dependent/Total  Rolling to Right: Dependent/Total  Sit to Supine: Dependent/Total  Scooting: Dependent/Total  Bed Mobility Comments: HOB elevated & linen used to assist with maneuvering pt during bed mobility.  Transfers  Sit to Stand: Maximum Assistance, face:face with pt  Stand to Sit: max assist of 1, face:face with pt  Comment: 10 reps of partial sit<->stands with buttocks clearance for the last 4 reps from EOB to EOB, R knee guarded with wt bearing focused through RLE. LLE with difficulty tolerating WB d/t reports of muscle 
treatment well    Plan  Physical Therapy Plan  General Plan: 6-7 times per week  Current Treatment Recommendations: Strengthening, ROM, Balance training, Functional mobility training, Transfer training, Endurance training, Wheelchair mobility training, Gait training, Neuromuscular re-education, Pain management, Safety education & training, Patient/Caregiver education & training, Equipment evaluation, education, & procurement, Positioning, Therapeutic activities  Safety Devices  Type of Devices: All fall risk precautions in place, Patient at risk for falls, Left in bed, Call light within reach, Heels elevated for pressure relief, Gait belt  Restraints  Restraints Initially in Place: No    Restrictions  Restrictions/Precautions  Restrictions/Precautions: Surgical Protocols, Fall Risk, General Precautions  Required Braces or Orthoses?: Yes  Required Braces or Orthoses  Cervical: c-collar  Position Activity Restriction  Other position/activity restrictions: HOB 30 degrees; Ambulate patient; Activity as tolerated; Fall from 10ft spinal cord injury, with mixed central cord pattern. SARA C. S/p C3- C5 laminectomy, C6-C7 laminotomy, C3-C5 fusion.     Subjective  General  Chart Reviewed: No  Patient assessed for rehabilitation services?: Yes  Response To Previous Treatment: Patient with no complaints from previous session.  Family / Caregiver Present: No  Follows Commands: Within Functional Limits  General Comment  Comments: Pt retired to bed supine with call light and RN notified.  Subjective  Subjective: Pt and RN agreeable to tx session, Pt supine in reporting 6/10 pain in R hand, positioned for comfort following session, Pt pleasant and cooperative t/o session.    Cognition   Orientation  Overall Orientation Status: Within Functional Limits  Orientation Level: Oriented X4  Cognition  Overall Cognitive Status: WFL    Objective  Bed mobility  Supine to Sit: 2 Person assistance;Maximum assistance  Sit to Supine: Maximum 
 Resp 19   Ht 1.88 m (6' 2\")   Wt 95.3 kg (210 lb)   SpO2 97%   BMI 26.96 kg/m²       GEN: well developed, well nourished, in NAD  HEENT: NCAT, PERRL, EOMI, mucous membranes pink and moist  CV: RRR, no murmurs, rubs or gallops  PULM: CTAB, no rales or rhonchi. Respirations WNL and unlabored  ABD: soft, NT, ND, BS+ and equal  NEURO: A&O x3. Sensation impaired to light touch. Allodynia/hyperesthesia over B hands  MSK: Functional ROM impaired all extremities but worse in RUE and LLE .  Strength 2/5 L shoulder flexion, 3-/5 L elbow flexion, 1/5 L hand/wrist muscles. R shoulder flexion 0/5. L hip flexion 3/5, L knee extension 3/5. RLE key muscles 4+/5.   EXTREMITIES: No calf tenderness to palpation bilaterally. No edema BLEs  SKIN: warm dry and intact with good turgor  PSYCH: appropriately interactive. Affect flat.     Current Medications:   Current Facility-Administered Medications: famotidine (PEPCID) tablet 20 mg, 20 mg, Oral, BID  oxyCODONE (ROXICODONE) immediate release tablet 5 mg, 5 mg, Oral, Q6H PRN **OR** [DISCONTINUED] oxyCODONE (ROXICODONE) immediate release tablet 10 mg, 10 mg, Oral, Q4H PRN  ibuprofen (ADVIL;MOTRIN) tablet 600 mg, 600 mg, Oral, Q6H  oxyCODONE (ROXICODONE) immediate release tablet 5 mg, 5 mg, Oral, Q4H PRN  methocarbamol (ROBAXIN) tablet 750 mg, 750 mg, Oral, TID  midodrine (PROAMATINE) tablet 10 mg, 10 mg, Oral, TID WC  gabapentin (NEURONTIN) capsule 900 mg, 900 mg, Oral, TID  lactated ringers IV soln infusion, , IntraVENous, Continuous  norepinephrine (LEVOPHED) 16 mg in sodium chloride 0.9 % 250 mL infusion, 1-100 mcg/min, IntraVENous, Continuous  fentaNYL (SUBLIMAZE) injection 50 mcg, 50 mcg, IntraVENous, Q2H PRN  sodium chloride flush 0.9 % injection 5-40 mL, 5-40 mL, IntraVENous, 2 times per day  sodium chloride flush 0.9 % injection 5-40 mL, 5-40 mL, IntraVENous, PRN  0.9 % sodium chloride infusion, , IntraVENous, PRN  midazolam PF (VERSED) injection 1 mg, 1 mg, IntraVENous, Q10 
Score : 6  AM-PAC Inpatient T-Scale Score : 23.55  Mobility Inpatient CMS 0-100% Score: 100  Mobility Inpatient CMS G-Code Modifier : CN     Goals  Short Term Goals  Time Frame for Short Term Goals: 14 visits  Short Term Goal 1: rolling L/R with mod A+1  Short Term Goal 2: supine to sit with mod A+2  Short Term Goal 3: sit to stand with mod A+2, likely bracing will be needed for LLE ie knee immobilizer  Short Term Goal 4: progress to standing/gait activities as appropriate with mod A+2 x 10'  Short Term Goal 5: attempt WC mobility propelling WC with RLE x 10'  Patient Goals   Patient Goals : be able to walk again  Education  Patient Education  Education Given To: Patient  Education Provided: Plan of Care;Role of Therapy  Education Method: Verbal  Barriers to Learning: None  Education Outcome: Verbalized understanding        Therapy Time    Individual Concurrent Group Co-treatment   Time In 1505      Time Out 1551      Minutes 46       46 minutes   Laith Price, PT      
non-functional (R shoulder Has shoulder shrugs, shoulder flexion/abduction 1+/5; elbow flexion/extension 2+/5; wrist flexion/extsion 2-/5; digits limited due to pain, unable to test.) (L shoulder Has shoulder shrugs, shoulder flexion/abduction 1+/5; elbow flexion/extension 2+/5; wrist flexion/extsion 2-/5; digits limited due to pain, unable to test.)  Coordination: Grossly decreased, non-functional  Sensation: Impaired (Hypersentive B hands)  Hand Dominance: Right     Objective  Orientation  Overall Orientation Status: Within Functional Limits  Orientation Level: Oriented X4  Cognition  Overall Cognitive Status: WFL    Activities of Daily Living  Feeding: Dependent/Total;Increased time to complete;Bringing food to mouth assist;Adaptive utensils (Comment)  Feeding Skilled Clinical Factors: Patient positioned in chair position in ICU bed, although has some movement wrist, elbow and muscle contractions without movement in B shoulder, assisted completly this date with meal, will benefit from trialing universal ADL or like equipment for self feeding independence, unable to tolerate closed grasp this date for built up utensils/foam utensils  Grooming: Dependent/Total;Based on clinical judgement  UE Bathing: Dependent/Total;Based on clinical judgement  LE Bathing: Dependent/Total;Based on clinical judgement  UE Dressing: Dependent/Total;Based on clinical judgement  LE Dressing: Dependent/Total;Based on clinical judgement  Toileting: Dependent/Total;Based on clinical judgement    Balance  Balance  Sitting:  (Not Addressed this date, will need further assesment next session)  Standing:  (Not Addressed this date, will need further assesment next session)    Transfers/Mobility  Bed mobility  Bed Mobility Comments: Pt requested hold d/t recent PT session, reports pain has increased after PT session, does not feel will tolerate 2 attempt at mobility    Transfers  Transfer Comments: Not appropriate at this date         Functional 
of the right forearm. 2. No acute fracture or dislocation. Right wrist: 1. Mild soft tissue swelling the right wrist. 2. No acute fracture or dislocation. Right shoulder: 1. Mild degenerative changes as detailed above. 2. No acute fracture or dislocation. Left ankle: No acute fracture or dislocation. Left foot: No acute fracture or dislocation.     XR RADIUS ULNA RIGHT (2 VIEWS)    Result Date: 9/24/2024  Left forearm: 1. Mild soft tissue edema of the left forearm 2. No acute osseous abnormality. Left wrist: 1. Mild soft tissue swelling of the left wrist. 2. No acute fracture or dislocation. Right forearm: 1. Mild soft tissue edema of the right forearm. 2. No acute fracture or dislocation. Right wrist: 1. Mild soft tissue swelling the right wrist. 2. No acute fracture or dislocation. Right shoulder: 1. Mild degenerative changes as detailed above. 2. No acute fracture or dislocation. Left ankle: No acute fracture or dislocation. Left foot: No acute fracture or dislocation.     XR FOOT LEFT (MIN 3 VIEWS)    Result Date: 9/24/2024  Left forearm: 1. Mild soft tissue edema of the left forearm 2. No acute osseous abnormality. Left wrist: 1. Mild soft tissue swelling of the left wrist. 2. No acute fracture or dislocation. Right forearm: 1. Mild soft tissue edema of the right forearm. 2. No acute fracture or dislocation. Right wrist: 1. Mild soft tissue swelling the right wrist. 2. No acute fracture or dislocation. Right shoulder: 1. Mild degenerative changes as detailed above. 2. No acute fracture or dislocation. Left ankle: No acute fracture or dislocation. Left foot: No acute fracture or dislocation.     XR ANKLE LEFT (MIN 3 VIEWS)    Result Date: 9/24/2024  Left forearm: 1. Mild soft tissue edema of the left forearm 2. No acute osseous abnormality. Left wrist: 1. Mild soft tissue swelling of the left wrist. 2. No acute fracture or dislocation. Right forearm: 1. Mild soft tissue edema of the right forearm. 2. No acute 
No marginal erosions are identified. No acute fracture or gross dislocation is seen. Scaphoid appears grossly intact. Mild soft tissue swelling of the right wrist. Right shoulder: Mild degenerative change of the right AC and glenohumeral joints.  Well corticated ossific density at the right AC joint likely related to sequela of remote trauma.  Visualized right-sided ribs appear intact.  No acute fracture or dislocation. Left ankle: Ankle mortise is intact.  Osseous alignment is normal.  Joint spaces are well maintained.  No acute fracture or gross dislocation is seen.  Mild distal Achilles enthesopathy. No significant soft tissue swelling is seen.  No tibiotalar joint effusion is identified.  Boehler's angle is maintained. Left foot: Osseous alignment is normal.  Joint spaces are well maintained.  No marginal erosions are identified.  No acute fracture or gross dislocation is seen. Mild distal Achilles enthesopathy. The medial and middle cuneiforms demonstrate proper alignment with the base of the 1st and 2nd metatarsals respectively. No tibiotalar joint effusion is seen. Boehler's angle is maintained.     Left forearm: 1. Mild soft tissue edema of the left forearm 2. No acute osseous abnormality. Left wrist: 1. Mild soft tissue swelling of the left wrist. 2. No acute fracture or dislocation. Right forearm: 1. Mild soft tissue edema of the right forearm. 2. No acute fracture or dislocation. Right wrist: 1. Mild soft tissue swelling the right wrist. 2. No acute fracture or dislocation. Right shoulder: 1. Mild degenerative changes as detailed above. 2. No acute fracture or dislocation. Left ankle: No acute fracture or dislocation. Left foot: No acute fracture or dislocation.     XR RADIUS ULNA RIGHT (2 VIEWS)    Result Date: 9/24/2024  EXAMINATION: 3 XRAY VIEWS OF THE LEFT WRIST; THREE XRAY VIEWS OF THE LEFT ANKLE; THREE XRAY VIEWS OF THE LEFT FOOT; TWO XRAY VIEWS OF THE LEFT FOREARM; TWO XRAY VIEWS OF THE RIGHT

## 2024-10-02 NOTE — PLAN OF CARE
NEUROSURGERY TO SIGN OFF     Please contact Neurosurgery with any questions or acute changes in neurological exam    PATIENT TO FOLLOW UP IN CLINIC:  Follow-up with Neurosurgery  [facility]  [address] [place] [facility] [address]  [address]  Call [phone] for an appointment.        FU in 2 weeks for post op wound check    Electronically signed by SANDRA Buck NP on 9/30/2024 at 6:33 PM   
  Problem: Discharge Planning  Goal: Discharge to home or other facility with appropriate resources  10/1/2024 0737 by Bozena Arrington RN  Outcome: Progressing  10/1/2024 0652 by Guerline Cam RN  Outcome: Progressing     Problem: Pain  Goal: Verbalizes/displays adequate comfort level or baseline comfort level  10/1/2024 0652 by Guerline Cam RN  Outcome: Progressing     Problem: Skin/Tissue Integrity  Goal: Absence of new skin breakdown  Description: 1.  Monitor for areas of redness and/or skin breakdown  2.  Assess vascular access sites hourly  3.  Every 4-6 hours minimum:  Change oxygen saturation probe site  4.  Every 4-6 hours:  If on nasal continuous positive airway pressure, respiratory therapy assess nares and determine need for appliance change or resting period.  10/1/2024 0652 by Guerline Cam RN  Outcome: Progressing     Problem: Safety - Adult  Goal: Free from fall injury  10/1/2024 0652 by Guerline Cam RN  Outcome: Progressing     Problem: ABCDS Injury Assessment  Goal: Absence of physical injury  10/1/2024 0652 by Guerline Cam RN  Outcome: Progressing     Problem: Respiratory - Adult  Goal: Achieves optimal ventilation and oxygenation  10/1/2024 0652 by Guerline Cam RN  Outcome: Progressing     
  Problem: Discharge Planning  Goal: Discharge to home or other facility with appropriate resources  9/24/2024 2313 by Melida Whiting RN  Outcome: Progressing  9/24/2024 1539 by Salma Collins RN  Outcome: Progressing  Flowsheets (Taken 9/24/2024 1422)  Discharge to home or other facility with appropriate resources:   Identify barriers to discharge with patient and caregiver   Arrange for needed discharge resources and transportation as appropriate   Identify discharge learning needs (meds, wound care, etc)   Refer to discharge planning if patient needs post-hospital services based on physician order or complex needs related to functional status, cognitive ability or social support system     Problem: Pain  Goal: Verbalizes/displays adequate comfort level or baseline comfort level  9/24/2024 2313 by Melida Whiting RN  Outcome: Progressing  9/24/2024 1539 by Salma Collins RN  Outcome: Progressing  Flowsheets (Taken 9/24/2024 1440)  Verbalizes/displays adequate comfort level or baseline comfort level:   Encourage patient to monitor pain and request assistance   Assess pain using appropriate pain scale   Administer analgesics based on type and severity of pain and evaluate response   Implement non-pharmacological measures as appropriate and evaluate response     Problem: Skin/Tissue Integrity  Goal: Absence of new skin breakdown  Description: 1.  Monitor for areas of redness and/or skin breakdown  2.  Assess vascular access sites hourly  3.  Every 4-6 hours minimum:  Change oxygen saturation probe site  4.  Every 4-6 hours:  If on nasal continuous positive airway pressure, respiratory therapy assess nares and determine need for appliance change or resting period.  9/24/2024 2313 by Melida Whiting RN  Outcome: Progressing  9/24/2024 1539 by Salma Collins RN  Outcome: Progressing     Problem: Safety - Adult  Goal: Free from fall injury  9/24/2024 2313 by Melida Whiting RN  Outcome: Progressing  9/24/2024 1539 by Dennis 
  Problem: Discharge Planning  Goal: Discharge to home or other facility with appropriate resources  9/26/2024 0937 by Lupillo Dunbar RN  Outcome: Progressing  Flowsheets (Taken 9/26/2024 0800)  Discharge to home or other facility with appropriate resources: Identify barriers to discharge with patient and caregiver  9/26/2024 0533 by Fan Greenwood RN  Outcome: Progressing     Problem: Pain  Goal: Verbalizes/displays adequate comfort level or baseline comfort level  9/26/2024 0937 by Lupillo Dunbar RN  Outcome: Progressing  9/26/2024 0533 by Fan Greenwood RN  Outcome: Progressing     Problem: Skin/Tissue Integrity  Goal: Absence of new skin breakdown  Description: 1.  Monitor for areas of redness and/or skin breakdown  2.  Assess vascular access sites hourly  3.  Every 4-6 hours minimum:  Change oxygen saturation probe site  4.  Every 4-6 hours:  If on nasal continuous positive airway pressure, respiratory therapy assess nares and determine need for appliance change or resting period.  9/26/2024 0937 by Lupillo Dunbar RN  Outcome: Progressing  9/26/2024 0533 by Fan Greenwood RN  Outcome: Progressing     Problem: Safety - Adult  Goal: Free from fall injury  9/26/2024 0937 by Lupillo Dunbar RN  Outcome: Progressing  9/26/2024 0533 by Fan Greenwood RN  Outcome: Progressing     Problem: ABCDS Injury Assessment  Goal: Absence of physical injury  9/26/2024 0937 by Lupillo Dunbar RN  Outcome: Progressing  9/26/2024 0533 by Fan Greenwood RN  Outcome: Progressing     Problem: Respiratory - Adult  Goal: Achieves optimal ventilation and oxygenation  9/26/2024 0937 by Lupillo Dunbar RN  Outcome: Progressing  9/26/2024 0533 by Fan Greenwood RN  Outcome: Progressing  Flowsheets (Taken 9/25/2024 1822 by Nicolasa Shahid [place])  Achieves optimal ventilation and oxygenation:   Assess for changes in respiratory status   Assess for changes in 
  Problem: Discharge Planning  Goal: Discharge to home or other facility with appropriate resources  9/26/2024 1819 by Lupillo Dunbar RN  Outcome: Progressing  9/26/2024 0937 by Lupillo Dunbar RN  Outcome: Progressing  Flowsheets (Taken 9/26/2024 0800)  Discharge to home or other facility with appropriate resources: Identify barriers to discharge with patient and caregiver  9/26/2024 0533 by Fan Greenwood RN  Outcome: Progressing     Problem: Pain  Goal: Verbalizes/displays adequate comfort level or baseline comfort level  9/26/2024 1819 by Lupillo Dunbar RN  Outcome: Progressing  9/26/2024 0937 by Lupillo Dunbar RN  Outcome: Progressing  9/26/2024 0533 by Fan Greenwood RN  Outcome: Progressing     Problem: Skin/Tissue Integrity  Goal: Absence of new skin breakdown  Description: 1.  Monitor for areas of redness and/or skin breakdown  2.  Assess vascular access sites hourly  3.  Every 4-6 hours minimum:  Change oxygen saturation probe site  4.  Every 4-6 hours:  If on nasal continuous positive airway pressure, respiratory therapy assess nares and determine need for appliance change or resting period.  9/26/2024 1819 by Lupillo Dunbar RN  Outcome: Progressing  9/26/2024 0937 by Lupillo Dunbar RN  Outcome: Progressing  9/26/2024 0533 by Fan Greenwood RN  Outcome: Progressing     Problem: Safety - Adult  Goal: Free from fall injury  9/26/2024 1819 by Lupillo Dunbar RN  Outcome: Progressing  9/26/2024 0937 by Lupillo Dunbar RN  Outcome: Progressing  9/26/2024 0533 by Fan Greenwood RN  Outcome: Progressing     Problem: ABCDS Injury Assessment  Goal: Absence of physical injury  9/26/2024 1819 by Lupillo Dunbar RN  Outcome: Progressing  9/26/2024 0937 by Lupillo Dunbar RN  Outcome: Progressing  9/26/2024 0533 by Fan Greenwood RN  Outcome: Progressing     Problem: Respiratory - Adult  Goal: Achieves 
  Problem: Discharge Planning  Goal: Discharge to home or other facility with appropriate resources  9/27/2024 0404 by Zandra Mcintosh RN  Outcome: Progressing     Problem: Pain  Goal: Verbalizes/displays adequate comfort level or baseline comfort level  9/27/2024 0404 by Zandra Mcintosh RN  Outcome: Progressing     Problem: Skin/Tissue Integrity  Goal: Absence of new skin breakdown  Description: 1.  Monitor for areas of redness and/or skin breakdown  2.  Assess vascular access sites hourly  3.  Every 4-6 hours minimum:  Change oxygen saturation probe site  4.  Every 4-6 hours:  If on nasal continuous positive airway pressure, respiratory therapy assess nares and determine need for appliance change or resting period.  9/27/2024 0404 by Zandra Mcintosh RN  Outcome: Progressing     Problem: Safety - Adult  Goal: Free from fall injury  9/27/2024 0404 by Zandra Mcintosh RN  Outcome: Progressing     Problem: ABCDS Injury Assessment  Goal: Absence of physical injury  9/27/2024 0404 by Zandra Mcintosh RN  Outcome: Progressing     Problem: Respiratory - Adult  Goal: Achieves optimal ventilation and oxygenation  9/27/2024 0404 by Zandra Mcintosh RN  Outcome: Progressing     Problem: Safety - Medical Restraint  Goal: Remains free of injury from restraints (Restraint for Interference with Medical Device)  Description: INTERVENTIONS:  1. Determine that other, less restrictive measures have been tried or would not be effective before applying the restraint  2. Evaluate the patient's condition at the time of restraint application  3. Inform patient/family regarding the reason for restraint  4. Q2H: Monitor safety, psychosocial status, comfort, nutrition and hydration  9/27/2024 0404 by Zandra Mcintosh RN  Outcome: Completed     
  Problem: Discharge Planning  Goal: Discharge to home or other facility with appropriate resources  9/27/2024 2222 by Augustus Werner RN  Outcome: Progressing  Flowsheets (Taken 9/27/2024 2000)  Discharge to home or other facility with appropriate resources:   Identify barriers to discharge with patient and caregiver   Arrange for needed discharge resources and transportation as appropriate   Identify discharge learning needs (meds, wound care, etc)   Refer to discharge planning if patient needs post-hospital services based on physician order or complex needs related to functional status, cognitive ability or social support system  9/27/2024 1937 by Zandra Carlisle RN  Outcome: Progressing     Problem: Pain  Goal: Verbalizes/displays adequate comfort level or baseline comfort level  9/27/2024 2222 by Augustus Werner RN  Outcome: Progressing  Flowsheets (Taken 9/27/2024 2000)  Verbalizes/displays adequate comfort level or baseline comfort level:   Encourage patient to monitor pain and request assistance   Assess pain using appropriate pain scale   Administer analgesics based on type and severity of pain and evaluate response   Implement non-pharmacological measures as appropriate and evaluate response   Notify Licensed Independent Practitioner if interventions unsuccessful or patient reports new pain   Consider cultural and social influences on pain and pain management  9/27/2024 1937 by Zandra Carlisle RN  Outcome: Progressing     Problem: Skin/Tissue Integrity  Goal: Absence of new skin breakdown  Description: 1.  Monitor for areas of redness and/or skin breakdown  2.  Assess vascular access sites hourly  3.  Every 4-6 hours minimum:  Change oxygen saturation probe site  4.  Every 4-6 hours:  If on nasal continuous positive airway pressure, respiratory therapy assess nares and determine need for appliance change or resting period.  9/27/2024 2222 by Augustus Werner, RN  Outcome: Progressing  9/27/2024 1937 by 
  Problem: Discharge Planning  Goal: Discharge to home or other facility with appropriate resources  9/30/2024 0928 by Bozena Arrington RN  Outcome: Progressing  Flowsheets (Taken 9/30/2024 0800)  Discharge to home or other facility with appropriate resources:   Identify barriers to discharge with patient and caregiver   Arrange for needed discharge resources and transportation as appropriate   Identify discharge learning needs (meds, wound care, etc)   Arrange for interpreters to assist at discharge as needed   Refer to discharge planning if patient needs post-hospital services based on physician order or complex needs related to functional status, cognitive ability or social support system  9/30/2024 0633 by Kimberly Pedroza RN  Outcome: Progressing  Flowsheets (Taken 9/29/2024 2000)  Discharge to home or other facility with appropriate resources:   Identify barriers to discharge with patient and caregiver   Arrange for needed discharge resources and transportation as appropriate   Identify discharge learning needs (meds, wound care, etc)   Refer to discharge planning if patient needs post-hospital services based on physician order or complex needs related to functional status, cognitive ability or social support system     Problem: Pain  Goal: Verbalizes/displays adequate comfort level or baseline comfort level  9/30/2024 0928 by Bozena Arrington RN  Outcome: Progressing  Flowsheets (Taken 9/30/2024 0800)  Verbalizes/displays adequate comfort level or baseline comfort level:   Encourage patient to monitor pain and request assistance   Assess pain using appropriate pain scale   Administer analgesics based on type and severity of pain and evaluate response  9/30/2024 0633 by Kimberly Pedroza RN  Outcome: Progressing  Flowsheets (Taken 9/29/2024 2000)  Verbalizes/displays adequate comfort level or baseline comfort level: Encourage patient to monitor pain and request assistance     Problem: Skin/Tissue Integrity  Goal: Absence 
  Problem: Discharge Planning  Goal: Discharge to home or other facility with appropriate resources  Outcome: Completed     Problem: Pain  Goal: Verbalizes/displays adequate comfort level or baseline comfort level  Outcome: Completed     Problem: Skin/Tissue Integrity  Goal: Absence of new skin breakdown  Description: 1.  Monitor for areas of redness and/or skin breakdown  2.  Assess vascular access sites hourly  3.  Every 4-6 hours minimum:  Change oxygen saturation probe site  4.  Every 4-6 hours:  If on nasal continuous positive airway pressure, respiratory therapy assess nares and determine need for appliance change or resting period.  Outcome: Completed     Problem: Safety - Adult  Goal: Free from fall injury  Outcome: Completed     Problem: ABCDS Injury Assessment  Goal: Absence of physical injury  Outcome: Completed     Problem: Respiratory - Adult  Goal: Achieves optimal ventilation and oxygenation  Outcome: Completed     Problem: Nutrition Deficit:  Goal: Optimize nutritional status  10/2/2024 1726 by Shanita Toledo, RN  Outcome: Completed  10/2/2024 1332 by Zandra Green RD  Outcome: Progressing  Flowsheets (Taken 10/2/2024 1332)  Nutrient intake appropriate for improving, restoring, or maintaining nutritional needs:   Assess nutritional status and recommend course of action   Recommend appropriate diets, oral nutritional supplements, and vitamin/mineral supplements   Monitor oral intake, labs, and treatment plans     
  Problem: Discharge Planning  Goal: Discharge to home or other facility with appropriate resources  Outcome: Progressing     Problem: Pain  Goal: Verbalizes/displays adequate comfort level or baseline comfort level  Outcome: Progressing     Problem: Skin/Tissue Integrity  Goal: Absence of new skin breakdown  Description: 1.  Monitor for areas of redness and/or skin breakdown  2.  Assess vascular access sites hourly  3.  Every 4-6 hours minimum:  Change oxygen saturation probe site  4.  Every 4-6 hours:  If on nasal continuous positive airway pressure, respiratory therapy assess nares and determine need for appliance change or resting period.  Outcome: Progressing     Problem: Safety - Adult  Goal: Free from fall injury  Outcome: Progressing     Problem: ABCDS Injury Assessment  Goal: Absence of physical injury  Outcome: Progressing     Problem: Respiratory - Adult  Goal: Achieves optimal ventilation and oxygenation  Outcome: Progressing     
  Problem: Discharge Planning  Goal: Discharge to home or other facility with appropriate resources  Outcome: Progressing     Problem: Pain  Goal: Verbalizes/displays adequate comfort level or baseline comfort level  Outcome: Progressing     Problem: Skin/Tissue Integrity  Goal: Absence of new skin breakdown  Description: 1.  Monitor for areas of redness and/or skin breakdown  2.  Assess vascular access sites hourly  3.  Every 4-6 hours minimum:  Change oxygen saturation probe site  4.  Every 4-6 hours:  If on nasal continuous positive airway pressure, respiratory therapy assess nares and determine need for appliance change or resting period.  Outcome: Progressing     Problem: Safety - Adult  Goal: Free from fall injury  Outcome: Progressing     Problem: ABCDS Injury Assessment  Goal: Absence of physical injury  Outcome: Progressing     Problem: Respiratory - Adult  Goal: Achieves optimal ventilation and oxygenation  Outcome: Progressing  Flowsheets (Taken 9/25/2024 1822 by Nicolasa Shahid [place])  Achieves optimal ventilation and oxygenation:   Assess for changes in respiratory status   Assess for changes in mentation and behavior   Position to facilitate oxygenation and minimize respiratory effort   Oxygen supplementation based on oxygen saturation or arterial blood gases   Encourage broncho-pulmonary hygiene including cough, deep breathe, incentive spirometry   Assess the need for suctioning and aspirate as needed   Assess and instruct to report shortness of breath or any respiratory difficulty   Respiratory therapy support as indicated     Problem: Safety - Medical Restraint  Goal: Remains free of injury from restraints (Restraint for Interference with Medical Device)  Description: INTERVENTIONS:  1. Determine that other, less restrictive measures have been tried or would not be effective before applying the restraint  2. Evaluate the patient's condition at the time of restraint application  3. Inform 
  Problem: Discharge Planning  Goal: Discharge to home or other facility with appropriate resources  Outcome: Progressing     Problem: Skin/Tissue Integrity  Goal: Absence of new skin breakdown  Description: 1.  Monitor for areas of redness and/or skin breakdown  2.  Assess vascular access sites hourly  3.  Every 4-6 hours minimum:  Change oxygen saturation probe site  4.  Every 4-6 hours:  If on nasal continuous positive airway pressure, respiratory therapy assess nares and determine need for appliance change or resting period.  Outcome: Progressing     Problem: Safety - Adult  Goal: Free from fall injury  Outcome: Progressing     Problem: Pain  Goal: Verbalizes/displays adequate comfort level or baseline comfort level  Outcome: Progressing     
  Problem: Discharge Planning  Goal: Discharge to home or other facility with appropriate resources  Outcome: Progressing  Flowsheets (Taken 9/24/2024 1422)  Discharge to home or other facility with appropriate resources:   Identify barriers to discharge with patient and caregiver   Arrange for needed discharge resources and transportation as appropriate   Identify discharge learning needs (meds, wound care, etc)   Refer to discharge planning if patient needs post-hospital services based on physician order or complex needs related to functional status, cognitive ability or social support system     Problem: Pain  Goal: Verbalizes/displays adequate comfort level or baseline comfort level  Outcome: Progressing  Flowsheets (Taken 9/24/2024 1440)  Verbalizes/displays adequate comfort level or baseline comfort level:   Encourage patient to monitor pain and request assistance   Assess pain using appropriate pain scale   Administer analgesics based on type and severity of pain and evaluate response   Implement non-pharmacological measures as appropriate and evaluate response     Problem: Skin/Tissue Integrity  Goal: Absence of new skin breakdown  Description: 1.  Monitor for areas of redness and/or skin breakdown  2.  Assess vascular access sites hourly  3.  Every 4-6 hours minimum:  Change oxygen saturation probe site  4.  Every 4-6 hours:  If on nasal continuous positive airway pressure, respiratory therapy assess nares and determine need for appliance change or resting period.  Outcome: Progressing     Problem: Safety - Adult  Goal: Free from fall injury  Outcome: Progressing     
  Problem: Discharge Planning  Goal: Discharge to home or other facility with appropriate resources  Outcome: Progressing  Flowsheets (Taken 9/29/2024 2000)  Discharge to home or other facility with appropriate resources:   Identify barriers to discharge with patient and caregiver   Arrange for needed discharge resources and transportation as appropriate   Identify discharge learning needs (meds, wound care, etc)   Refer to discharge planning if patient needs post-hospital services based on physician order or complex needs related to functional status, cognitive ability or social support system     Problem: Pain  Goal: Verbalizes/displays adequate comfort level or baseline comfort level  Outcome: Progressing  Flowsheets (Taken 9/29/2024 2000)  Verbalizes/displays adequate comfort level or baseline comfort level: Encourage patient to monitor pain and request assistance     Problem: Skin/Tissue Integrity  Goal: Absence of new skin breakdown  Description: 1.  Monitor for areas of redness and/or skin breakdown  2.  Assess vascular access sites hourly  3.  Every 4-6 hours minimum:  Change oxygen saturation probe site  4.  Every 4-6 hours:  If on nasal continuous positive airway pressure, respiratory therapy assess nares and determine need for appliance change or resting period.  Outcome: Progressing     Problem: Safety - Adult  Goal: Free from fall injury  Outcome: Progressing  Flowsheets (Taken 9/29/2024 2000)  Free From Fall Injury:   Instruct family/caregiver on patient safety   Based on caregiver fall risk screen, instruct family/caregiver to ask for assistance with transferring infant if caregiver noted to have fall risk factors     Problem: ABCDS Injury Assessment  Goal: Absence of physical injury  Outcome: Progressing  Flowsheets (Taken 9/29/2024 2000)  Absence of Physical Injury: Implement safety measures based on patient assessment     Problem: Respiratory - Adult  Goal: Achieves optimal ventilation and 
Every 4-6 hours minimum:  Change oxygen saturation probe site  4.  Every 4-6 hours:  If on nasal continuous positive airway pressure, respiratory therapy assess nares and determine need for appliance change or resting period.  Outcome: Progressing     Problem: Safety - Adult  Goal: Free from fall injury  Outcome: Progressing     Problem: ABCDS Injury Assessment  Goal: Absence of physical injury  Outcome: Progressing     Problem: Respiratory - Adult  Goal: Achieves optimal ventilation and oxygenation  Outcome: Progressing  Flowsheets (Taken 10/1/2024 0800 by Bozena Arrington RN)  Achieves optimal ventilation and oxygenation:   Assess for changes in respiratory status   Assess for changes in mentation and behavior   Position to facilitate oxygenation and minimize respiratory effort

## 2024-10-02 NOTE — CARE COORDINATION
Transitional Planning  Received VM from Monik with [facility] stating she has auth.  She would like to take him at noon if that can be arranged.  Updated Crist[facility] [employer] per phone

## 2024-10-02 NOTE — CARE COORDINATION
Spoke to Zhanna at [facility] and was informed patient was accepted but requested transport be arranged for anytime after 4pm.     1208: [facility]    1318: Called [facility] and informed the patient is scheduled for transport at 4pm. Melida verbalized understanding. AVS printed and GLADIS completed.

## 2024-10-03 ENCOUNTER — HOSPITAL ENCOUNTER (OUTPATIENT)
Age: 54
Setting detail: SPECIMEN
Discharge: HOME | End: 2024-10-03

## 2024-10-03 LAB
ALBUMIN SERPL-MCNC: 3 G/DL (ref 3.5–5.2)
ALP SERPL-CCNC: 73 U/L (ref 40–129)
ALT SERPL-CCNC: 32 U/L (ref 5–41)
ANION GAP SERPL CALCULATED.3IONS-SCNC: 10 MMOL/L (ref 9–17)
AST SERPL-CCNC: 31 U/L
BASOPHILS # BLD: <0.03 K/UL (ref 0–0.2)
BASOPHILS NFR BLD: 0 % (ref 0–2)
BILIRUB SERPL-MCNC: 0.3 MG/DL (ref 0.3–1.2)
BUN SERPL-MCNC: 19 MG/DL (ref 6–20)
BUN/CREAT SERPL: 24 (ref 9–20)
CALCIUM SERPL-MCNC: 8.5 MG/DL (ref 8.6–10.4)
CHLORIDE SERPL-SCNC: 104 MMOL/L (ref 98–107)
CO2 SERPL-SCNC: 24 MMOL/L (ref 20–31)
CREAT SERPL-MCNC: 0.8 MG/DL (ref 0.7–1.2)
EOSINOPHIL # BLD: 0.36 K/UL (ref 0–0.44)
EOSINOPHILS RELATIVE PERCENT: 6 % (ref 1–4)
ERYTHROCYTE [DISTWIDTH] IN BLOOD BY AUTOMATED COUNT: 13.2 % (ref 11.8–14.4)
GFR, ESTIMATED: >90 ML/MIN/1.73M2
GLUCOSE SERPL-MCNC: 90 MG/DL (ref 70–99)
HCT VFR BLD AUTO: 38.5 % (ref 40.7–50.3)
HGB BLD-MCNC: 13 G/DL (ref 13–17)
IMM GRANULOCYTES # BLD AUTO: 0.03 K/UL (ref 0–0.3)
IMM GRANULOCYTES NFR BLD: 1 %
LYMPHOCYTES NFR BLD: 1.61 K/UL (ref 1.1–3.7)
LYMPHOCYTES RELATIVE PERCENT: 29 % (ref 24–43)
MAGNESIUM SERPL-MCNC: 1.9 MG/DL (ref 1.6–2.6)
MCH RBC QN AUTO: 33.2 PG (ref 25.2–33.5)
MCHC RBC AUTO-ENTMCNC: 33.8 G/DL (ref 28.4–34.8)
MCV RBC AUTO: 98.5 FL (ref 82.6–102.9)
MONOCYTES NFR BLD: 0.8 K/UL (ref 0.1–1.2)
MONOCYTES NFR BLD: 14 % (ref 3–12)
NEUTROPHILS NFR BLD: 50 % (ref 36–65)
NEUTS SEG NFR BLD: 2.8 K/UL (ref 1.5–8.1)
NRBC BLD-RTO: 0 PER 100 WBC
PHOSPHATE SERPL-MCNC: 3.6 MG/DL (ref 2.5–4.5)
PLATELET # BLD AUTO: 297 K/UL (ref 138–453)
PMV BLD AUTO: 9.8 FL (ref 8.1–13.5)
POTASSIUM SERPL-SCNC: 4.9 MMOL/L (ref 3.7–5.3)
PROT SERPL-MCNC: 5.9 G/DL (ref 6.4–8.3)
RBC # BLD AUTO: 3.91 M/UL (ref 4.21–5.77)
SODIUM SERPL-SCNC: 138 MMOL/L (ref 135–144)
WBC OTHER # BLD: 5.6 K/UL (ref 3.5–11.3)

## 2024-10-03 PROCEDURE — 87086 URINE CULTURE/COLONY COUNT: CPT

## 2024-10-03 PROCEDURE — 80053 COMPREHEN METABOLIC PANEL: CPT

## 2024-10-03 PROCEDURE — 36415 COLL VENOUS BLD VENIPUNCTURE: CPT

## 2024-10-03 PROCEDURE — 87186 SC STD MICRODIL/AGAR DIL: CPT

## 2024-10-03 PROCEDURE — P9603 ONE-WAY ALLOW PRORATED MILES: HCPCS

## 2024-10-03 PROCEDURE — 85025 COMPLETE CBC W/AUTO DIFF WBC: CPT

## 2024-10-03 PROCEDURE — 83735 ASSAY OF MAGNESIUM: CPT

## 2024-10-03 PROCEDURE — 84100 ASSAY OF PHOSPHORUS: CPT

## 2024-10-03 PROCEDURE — 87088 URINE BACTERIA CULTURE: CPT

## 2024-10-03 PROCEDURE — 81001 URINALYSIS AUTO W/SCOPE: CPT

## 2024-10-04 ENCOUNTER — HOSPITAL ENCOUNTER (OUTPATIENT)
Age: 54
Setting detail: SPECIMEN
Discharge: HOME | End: 2024-10-04

## 2024-10-04 LAB
ALBUMIN SERPL-MCNC: 3.1 G/DL (ref 3.5–5.2)
ALP SERPL-CCNC: 78 U/L (ref 40–129)
ALT SERPL-CCNC: 33 U/L (ref 5–41)
ANION GAP SERPL CALCULATED.3IONS-SCNC: 7 MMOL/L (ref 9–17)
AST SERPL-CCNC: 30 U/L
BACTERIA URNS QL MICRO: NORMAL
BASOPHILS # BLD: 0.03 K/UL (ref 0–0.2)
BASOPHILS NFR BLD: 1 % (ref 0–2)
BILIRUB SERPL-MCNC: 0.4 MG/DL (ref 0.3–1.2)
BILIRUB UR QL STRIP: NEGATIVE
BUN SERPL-MCNC: 20 MG/DL (ref 6–20)
BUN/CREAT SERPL: 25 (ref 9–20)
CALCIUM SERPL-MCNC: 8.5 MG/DL (ref 8.6–10.4)
CASTS #/AREA URNS LPF: NORMAL /LPF (ref 0–8)
CHLORIDE SERPL-SCNC: 106 MMOL/L (ref 98–107)
CLARITY UR: CLEAR
CO2 SERPL-SCNC: 26 MMOL/L (ref 20–31)
COLOR UR: ABNORMAL
CREAT SERPL-MCNC: 0.8 MG/DL (ref 0.7–1.2)
EOSINOPHIL # BLD: 0.33 K/UL (ref 0–0.44)
EOSINOPHILS RELATIVE PERCENT: 7 % (ref 1–4)
EPI CELLS #/AREA URNS HPF: NORMAL /HPF (ref 0–5)
ERYTHROCYTE [DISTWIDTH] IN BLOOD BY AUTOMATED COUNT: 13.1 % (ref 11.8–14.4)
GFR, ESTIMATED: >90 ML/MIN/1.73M2
GLUCOSE SERPL-MCNC: 104 MG/DL (ref 70–99)
GLUCOSE UR STRIP-MCNC: NEGATIVE MG/DL
HCT VFR BLD AUTO: 39.6 % (ref 40.7–50.3)
HGB BLD-MCNC: 13.2 G/DL (ref 13–17)
HGB UR QL STRIP.AUTO: NEGATIVE
IMM GRANULOCYTES # BLD AUTO: 0.01 K/UL (ref 0–0.3)
IMM GRANULOCYTES NFR BLD: 0 %
KETONES UR STRIP-MCNC: NEGATIVE MG/DL
LEUKOCYTE ESTERASE UR QL STRIP: ABNORMAL
LYMPHOCYTES NFR BLD: 1.62 K/UL (ref 1.1–3.7)
LYMPHOCYTES RELATIVE PERCENT: 34 % (ref 24–43)
MCH RBC QN AUTO: 33 PG (ref 25.2–33.5)
MCHC RBC AUTO-ENTMCNC: 33.3 G/DL (ref 28.4–34.8)
MCV RBC AUTO: 99 FL (ref 82.6–102.9)
MONOCYTES NFR BLD: 0.73 K/UL (ref 0.1–1.2)
MONOCYTES NFR BLD: 16 % (ref 3–12)
NEUTROPHILS NFR BLD: 42 % (ref 36–65)
NEUTS SEG NFR BLD: 1.99 K/UL (ref 1.5–8.1)
NITRITE UR QL STRIP: NEGATIVE
NRBC BLD-RTO: 0 PER 100 WBC
PH UR STRIP: 7 [PH] (ref 5–8)
PLATELET # BLD AUTO: 320 K/UL (ref 138–453)
PMV BLD AUTO: 9.7 FL (ref 8.1–13.5)
POTASSIUM SERPL-SCNC: 4.5 MMOL/L (ref 3.7–5.3)
PROT SERPL-MCNC: 5.8 G/DL (ref 6.4–8.3)
PROT UR STRIP-MCNC: NEGATIVE MG/DL
RBC # BLD AUTO: 4 M/UL (ref 4.21–5.77)
RBC #/AREA URNS HPF: NORMAL /HPF (ref 0–4)
SODIUM SERPL-SCNC: 139 MMOL/L (ref 135–144)
SP GR UR STRIP: 1.02 (ref 1–1.03)
UROBILINOGEN UR STRIP-ACNC: ABNORMAL EU/DL (ref 0–1)
WBC #/AREA URNS HPF: NORMAL /HPF (ref 0–5)
WBC OTHER # BLD: 4.7 K/UL (ref 3.5–11.3)

## 2024-10-04 PROCEDURE — 85025 COMPLETE CBC W/AUTO DIFF WBC: CPT

## 2024-10-04 PROCEDURE — P9603 ONE-WAY ALLOW PRORATED MILES: HCPCS

## 2024-10-04 PROCEDURE — 80053 COMPREHEN METABOLIC PANEL: CPT

## 2024-10-04 PROCEDURE — 36415 COLL VENOUS BLD VENIPUNCTURE: CPT

## 2024-10-06 LAB
MICROORGANISM SPEC CULT: ABNORMAL
SPECIMEN DESCRIPTION: ABNORMAL

## 2024-10-08 ENCOUNTER — OFFICE VISIT (OUTPATIENT)
Dept: NEUROSURGERY | Age: 54
End: 2024-10-08

## 2024-10-08 ENCOUNTER — HOSPITAL ENCOUNTER (OUTPATIENT)
Age: 54
Setting detail: SPECIMEN
Discharge: HOME | End: 2024-10-08

## 2024-10-08 VITALS
BODY MASS INDEX: 26.95 KG/M2 | WEIGHT: 210 LBS | OXYGEN SATURATION: 98 % | HEIGHT: 74 IN | DIASTOLIC BLOOD PRESSURE: 87 MMHG | RESPIRATION RATE: 16 BRPM | SYSTOLIC BLOOD PRESSURE: 126 MMHG | HEART RATE: 81 BPM

## 2024-10-08 DIAGNOSIS — Z98.1 S/P CERVICAL SPINAL FUSION: ICD-10-CM

## 2024-10-08 DIAGNOSIS — S14.105D C5-C7 LEVEL SPINAL CORD INJURY, SUBSEQUENT ENCOUNTER (HCC): Primary | ICD-10-CM

## 2024-10-08 DIAGNOSIS — S14.129D CENTRAL CORD SYNDROME, SUBSEQUENT ENCOUNTER (HCC): ICD-10-CM

## 2024-10-08 LAB
ALBUMIN SERPL-MCNC: 3.3 G/DL (ref 3.5–5.2)
ALP SERPL-CCNC: 88 U/L (ref 40–129)
ALT SERPL-CCNC: 46 U/L (ref 5–41)
ANION GAP SERPL CALCULATED.3IONS-SCNC: 8 MMOL/L (ref 9–17)
AST SERPL-CCNC: 41 U/L
BASOPHILS # BLD: 0.04 K/UL (ref 0–0.2)
BASOPHILS NFR BLD: 1 % (ref 0–2)
BILIRUB SERPL-MCNC: 0.6 MG/DL (ref 0.3–1.2)
BUN SERPL-MCNC: 20 MG/DL (ref 6–20)
BUN/CREAT SERPL: 25 (ref 9–20)
CALCIUM SERPL-MCNC: 8.8 MG/DL (ref 8.6–10.4)
CHLORIDE SERPL-SCNC: 103 MMOL/L (ref 98–107)
CO2 SERPL-SCNC: 25 MMOL/L (ref 20–31)
CREAT SERPL-MCNC: 0.8 MG/DL (ref 0.7–1.2)
EOSINOPHIL # BLD: 0.37 K/UL (ref 0–0.44)
EOSINOPHILS RELATIVE PERCENT: 7 % (ref 1–4)
ERYTHROCYTE [DISTWIDTH] IN BLOOD BY AUTOMATED COUNT: 13.1 % (ref 11.8–14.4)
GFR, ESTIMATED: >90 ML/MIN/1.73M2
GLUCOSE SERPL-MCNC: 83 MG/DL (ref 70–99)
HCT VFR BLD AUTO: 41.2 % (ref 40.7–50.3)
HGB BLD-MCNC: 13.8 G/DL (ref 13–17)
IMM GRANULOCYTES # BLD AUTO: 0.03 K/UL (ref 0–0.3)
IMM GRANULOCYTES NFR BLD: 1 %
LYMPHOCYTES NFR BLD: 2.16 K/UL (ref 1.1–3.7)
LYMPHOCYTES RELATIVE PERCENT: 41 % (ref 24–43)
MCH RBC QN AUTO: 32.9 PG (ref 25.2–33.5)
MCHC RBC AUTO-ENTMCNC: 33.5 G/DL (ref 28.4–34.8)
MCV RBC AUTO: 98.3 FL (ref 82.6–102.9)
MONOCYTES NFR BLD: 0.62 K/UL (ref 0.1–1.2)
MONOCYTES NFR BLD: 12 % (ref 3–12)
NEUTROPHILS NFR BLD: 38 % (ref 36–65)
NEUTS SEG NFR BLD: 1.95 K/UL (ref 1.5–8.1)
NRBC BLD-RTO: 0 PER 100 WBC
PLATELET # BLD AUTO: 389 K/UL (ref 138–453)
PMV BLD AUTO: 9.6 FL (ref 8.1–13.5)
POTASSIUM SERPL-SCNC: 4.8 MMOL/L (ref 3.7–5.3)
PROT SERPL-MCNC: 6.1 G/DL (ref 6.4–8.3)
RBC # BLD AUTO: 4.19 M/UL (ref 4.21–5.77)
SODIUM SERPL-SCNC: 136 MMOL/L (ref 135–144)
WBC OTHER # BLD: 5.2 K/UL (ref 3.5–11.3)

## 2024-10-08 PROCEDURE — 85025 COMPLETE CBC W/AUTO DIFF WBC: CPT

## 2024-10-08 PROCEDURE — 36415 COLL VENOUS BLD VENIPUNCTURE: CPT

## 2024-10-08 PROCEDURE — P9603 ONE-WAY ALLOW PRORATED MILES: HCPCS

## 2024-10-08 PROCEDURE — 99024 POSTOP FOLLOW-UP VISIT: CPT | Performed by: NURSE PRACTITIONER

## 2024-10-08 PROCEDURE — 80053 COMPREHEN METABOLIC PANEL: CPT

## 2024-10-08 RX ORDER — POTASSIUM CHLORIDE 1.5 G/1.58G
20 POWDER, FOR SOLUTION ORAL 2 TIMES DAILY
COMMUNITY

## 2024-10-08 NOTE — PROGRESS NOTES
[facility] NEUROSURGERY [place] [id number] [facility]  [id number]  [address] - GROUND FLOOR, [address]  [facility] [address]  Dept: [phone]    Patient:  Lauro Munroe  YOB: 1970  Date: 10/8/24    The patient is a 54 y.o. male who presents today for consult of the following problems:     Chief Complaint   Patient presents with    Post-Op Check     Pt reports he has been healing well since his surgery, states he is in pain but not excessive pain in his neck.         HPI:     Lauro Munroe is a 54 y.o. male who presents to the office for post-op evaluation s/p C3-C5 decompression fusion.  Patient presented to the hospital following fall of approximately 10 feet.  Reports he had immediate onset numbness, tingling and weakness to both arms and legs.  Was treated for spinal cord injury and taken for surgery.  Is now currently in acute rehab facility.  Working with physical and occupational therapy approximately 3 hours/day.  Still with fairly significant deficits, however is appreciated continue improvement to mobility and strength as well as sensation.  Pain well-controlled.  Incision healing well.  Has been compliant with cervical collar.    Incision CDI  Right lower extremity approximately 4/5  Left lower extremity 3/5  Right upper extremity 4/5 distally; proximal weakness secondary to shoulder displacement  Left upper extremity 4/5 distally, deltoid and bicep 3/5  Tingling paresthesias to upper and lower extremities, reports gradually improving    Date of surgery: 9/25/2024    Assessment and Plan:     1. C5-C7 level spinal cord injury, subsequent encounter ([facility])    2. Central cord syndrome, subsequent encounter ([facility])    3. S/P cervical spinal fusion         Plan: Continue working with therapy.  Next postop visit in 4 weeks for reevaluation, upright x-rays prior.  Will likely need bone stimulator once discharged from facility.  Will send

## 2024-10-11 ENCOUNTER — HOSPITAL ENCOUNTER (OUTPATIENT)
Age: 54
Setting detail: SPECIMEN
Discharge: HOME | End: 2024-10-11

## 2024-10-11 LAB
ALBUMIN SERPL-MCNC: 3.5 G/DL (ref 3.5–5.2)
ALP SERPL-CCNC: 96 U/L (ref 40–129)
ALT SERPL-CCNC: 57 U/L (ref 5–41)
ANION GAP SERPL CALCULATED.3IONS-SCNC: 11 MMOL/L (ref 9–17)
AST SERPL-CCNC: 43 U/L
BASOPHILS # BLD: 0.04 K/UL (ref 0–0.2)
BASOPHILS NFR BLD: 1 % (ref 0–2)
BILIRUB SERPL-MCNC: 0.6 MG/DL (ref 0.3–1.2)
BUN SERPL-MCNC: 24 MG/DL (ref 6–20)
BUN/CREAT SERPL: 27 (ref 9–20)
CALCIUM SERPL-MCNC: 8.9 MG/DL (ref 8.6–10.4)
CHLORIDE SERPL-SCNC: 100 MMOL/L (ref 98–107)
CO2 SERPL-SCNC: 23 MMOL/L (ref 20–31)
CREAT SERPL-MCNC: 0.9 MG/DL (ref 0.7–1.2)
EOSINOPHIL # BLD: 0.23 K/UL (ref 0–0.44)
EOSINOPHILS RELATIVE PERCENT: 5 % (ref 1–4)
ERYTHROCYTE [DISTWIDTH] IN BLOOD BY AUTOMATED COUNT: 13.2 % (ref 11.8–14.4)
GFR, ESTIMATED: >90 ML/MIN/1.73M2
GLUCOSE SERPL-MCNC: 87 MG/DL (ref 70–99)
HCT VFR BLD AUTO: 41.7 % (ref 40.7–50.3)
HGB BLD-MCNC: 14.1 G/DL (ref 13–17)
IMM GRANULOCYTES # BLD AUTO: 0.02 K/UL (ref 0–0.3)
IMM GRANULOCYTES NFR BLD: 1 %
LYMPHOCYTES NFR BLD: 2.29 K/UL (ref 1.1–3.7)
LYMPHOCYTES RELATIVE PERCENT: 52 % (ref 24–43)
MCH RBC QN AUTO: 33.2 PG (ref 25.2–33.5)
MCHC RBC AUTO-ENTMCNC: 33.8 G/DL (ref 28.4–34.8)
MCV RBC AUTO: 98.1 FL (ref 82.6–102.9)
MONOCYTES NFR BLD: 0.5 K/UL (ref 0.1–1.2)
MONOCYTES NFR BLD: 11 % (ref 3–12)
NEUTROPHILS NFR BLD: 30 % (ref 36–65)
NEUTS SEG NFR BLD: 1.31 K/UL (ref 1.5–8.1)
NRBC BLD-RTO: 0 PER 100 WBC
PLATELET # BLD AUTO: 403 K/UL (ref 138–453)
PMV BLD AUTO: 9.4 FL (ref 8.1–13.5)
POTASSIUM SERPL-SCNC: 4.7 MMOL/L (ref 3.7–5.3)
PROT SERPL-MCNC: 6.5 G/DL (ref 6.4–8.3)
RBC # BLD AUTO: 4.25 M/UL (ref 4.21–5.77)
SODIUM SERPL-SCNC: 134 MMOL/L (ref 135–144)
WBC OTHER # BLD: 4.4 K/UL (ref 3.5–11.3)

## 2024-10-11 PROCEDURE — 80053 COMPREHEN METABOLIC PANEL: CPT

## 2024-10-11 PROCEDURE — 85025 COMPLETE CBC W/AUTO DIFF WBC: CPT

## 2024-10-11 PROCEDURE — P9603 ONE-WAY ALLOW PRORATED MILES: HCPCS

## 2024-10-11 PROCEDURE — 36415 COLL VENOUS BLD VENIPUNCTURE: CPT

## 2024-10-15 ENCOUNTER — HOSPITAL ENCOUNTER (OUTPATIENT)
Age: 54
Setting detail: SPECIMEN
Discharge: HOME | End: 2024-10-15

## 2024-10-15 LAB
ALBUMIN SERPL-MCNC: 3.3 G/DL (ref 3.5–5.2)
ALP SERPL-CCNC: 93 U/L (ref 40–129)
ALT SERPL-CCNC: 67 U/L (ref 5–41)
ANION GAP SERPL CALCULATED.3IONS-SCNC: 7 MMOL/L (ref 9–17)
AST SERPL-CCNC: 45 U/L
BASOPHILS # BLD: 0.04 K/UL (ref 0–0.2)
BASOPHILS NFR BLD: 1 % (ref 0–2)
BILIRUB SERPL-MCNC: 0.7 MG/DL (ref 0.3–1.2)
BUN SERPL-MCNC: 24 MG/DL (ref 6–20)
BUN/CREAT SERPL: 30 (ref 9–20)
CALCIUM SERPL-MCNC: 8.9 MG/DL (ref 8.6–10.4)
CHLORIDE SERPL-SCNC: 104 MMOL/L (ref 98–107)
CO2 SERPL-SCNC: 27 MMOL/L (ref 20–31)
CREAT SERPL-MCNC: 0.8 MG/DL (ref 0.7–1.2)
EOSINOPHIL # BLD: 0.27 K/UL (ref 0–0.44)
EOSINOPHILS RELATIVE PERCENT: 7 % (ref 1–4)
ERYTHROCYTE [DISTWIDTH] IN BLOOD BY AUTOMATED COUNT: 12.6 % (ref 11.8–14.4)
GFR, ESTIMATED: >90 ML/MIN/1.73M2
GLUCOSE SERPL-MCNC: 87 MG/DL (ref 70–99)
HCT VFR BLD AUTO: 40.7 % (ref 40.7–50.3)
HGB BLD-MCNC: 13.7 G/DL (ref 13–17)
IMM GRANULOCYTES # BLD AUTO: 0 K/UL (ref 0–0.3)
IMM GRANULOCYTES NFR BLD: 0 %
LYMPHOCYTES NFR BLD: 2.18 K/UL (ref 1.1–3.7)
LYMPHOCYTES RELATIVE PERCENT: 56 % (ref 24–43)
MCH RBC QN AUTO: 32.9 PG (ref 25.2–33.5)
MCHC RBC AUTO-ENTMCNC: 33.7 G/DL (ref 28.4–34.8)
MCV RBC AUTO: 97.8 FL (ref 82.6–102.9)
MONOCYTES NFR BLD: 0.51 K/UL (ref 0.1–1.2)
MONOCYTES NFR BLD: 13 % (ref 3–12)
NEUTROPHILS NFR BLD: 23 % (ref 36–65)
NEUTS SEG NFR BLD: 0.9 K/UL (ref 1.5–8.1)
NRBC BLD-RTO: 0 PER 100 WBC
PLATELET # BLD AUTO: 346 K/UL (ref 138–453)
PMV BLD AUTO: 9.7 FL (ref 8.1–13.5)
POTASSIUM SERPL-SCNC: 4.6 MMOL/L (ref 3.7–5.3)
PROT SERPL-MCNC: 6 G/DL (ref 6.4–8.3)
RBC # BLD AUTO: 4.16 M/UL (ref 4.21–5.77)
SODIUM SERPL-SCNC: 138 MMOL/L (ref 135–144)
WBC OTHER # BLD: 3.9 K/UL (ref 3.5–11.3)

## 2024-10-15 PROCEDURE — 85025 COMPLETE CBC W/AUTO DIFF WBC: CPT

## 2024-10-15 PROCEDURE — P9603 ONE-WAY ALLOW PRORATED MILES: HCPCS

## 2024-10-15 PROCEDURE — 36415 COLL VENOUS BLD VENIPUNCTURE: CPT

## 2024-10-15 PROCEDURE — 80053 COMPREHEN METABOLIC PANEL: CPT

## 2024-10-18 ENCOUNTER — HOSPITAL ENCOUNTER (OUTPATIENT)
Age: 54
Setting detail: SPECIMEN
Discharge: HOME | End: 2024-10-18

## 2024-10-18 LAB
ALBUMIN SERPL-MCNC: 3.3 G/DL (ref 3.5–5.2)
ALP SERPL-CCNC: 91 U/L (ref 40–129)
ALT SERPL-CCNC: 73 U/L (ref 5–41)
ANION GAP SERPL CALCULATED.3IONS-SCNC: 8 MMOL/L (ref 9–17)
AST SERPL-CCNC: 52 U/L
BASOPHILS # BLD: 0.04 K/UL (ref 0–0.2)
BASOPHILS NFR BLD: 1 %
BILIRUB SERPL-MCNC: 0.9 MG/DL (ref 0.3–1.2)
BUN SERPL-MCNC: 20 MG/DL (ref 6–20)
BUN/CREAT SERPL: 29 (ref 9–20)
CALCIUM SERPL-MCNC: 8.8 MG/DL (ref 8.6–10.4)
CHLORIDE SERPL-SCNC: 103 MMOL/L (ref 98–107)
CO2 SERPL-SCNC: 27 MMOL/L (ref 20–31)
CREAT SERPL-MCNC: 0.7 MG/DL (ref 0.7–1.2)
EOSINOPHIL # BLD: 0.14 K/UL (ref 0–0.4)
EOSINOPHILS RELATIVE PERCENT: 4 % (ref 1–4)
ERYTHROCYTE [DISTWIDTH] IN BLOOD BY AUTOMATED COUNT: 12.3 % (ref 11.8–14.4)
GFR, ESTIMATED: >90 ML/MIN/1.73M2
GLUCOSE SERPL-MCNC: 106 MG/DL (ref 70–99)
HCT VFR BLD AUTO: 40.5 % (ref 40.7–50.3)
HGB BLD-MCNC: 13.7 G/DL (ref 13–17)
IMM GRANULOCYTES # BLD AUTO: 0 K/UL (ref 0–0.3)
IMM GRANULOCYTES NFR BLD: 0 %
LYMPHOCYTES NFR BLD: 1.95 K/UL (ref 1–4.8)
LYMPHOCYTES RELATIVE PERCENT: 56 % (ref 24–44)
MCH RBC QN AUTO: 33 PG (ref 25.2–33.5)
MCHC RBC AUTO-ENTMCNC: 33.8 G/DL (ref 28.4–34.8)
MCV RBC AUTO: 97.6 FL (ref 82.6–102.9)
MONOCYTES NFR BLD: 0.42 K/UL (ref 0.2–0.8)
MONOCYTES NFR BLD: 12 % (ref 1–7)
NEUTROPHILS NFR BLD: 27 % (ref 36–66)
NEUTS SEG NFR BLD: 0.95 K/UL (ref 1.8–7.7)
NRBC BLD-RTO: 0 PER 100 WBC
PLATELET # BLD AUTO: 273 K/UL (ref 138–453)
PMV BLD AUTO: 9.7 FL (ref 8.1–13.5)
POTASSIUM SERPL-SCNC: 4.5 MMOL/L (ref 3.7–5.3)
PROT SERPL-MCNC: 5.9 G/DL (ref 6.4–8.3)
RBC # BLD AUTO: 4.15 M/UL (ref 4.21–5.77)
SODIUM SERPL-SCNC: 138 MMOL/L (ref 135–144)
WBC OTHER # BLD: 3.5 K/UL (ref 3.5–11.3)

## 2024-10-18 PROCEDURE — 36415 COLL VENOUS BLD VENIPUNCTURE: CPT

## 2024-10-18 PROCEDURE — 80053 COMPREHEN METABOLIC PANEL: CPT

## 2024-10-18 PROCEDURE — P9603 ONE-WAY ALLOW PRORATED MILES: HCPCS

## 2024-10-18 PROCEDURE — 85025 COMPLETE CBC W/AUTO DIFF WBC: CPT

## 2024-10-20 ENCOUNTER — HOSPITAL ENCOUNTER (OUTPATIENT)
Age: 54
Setting detail: SPECIMEN
Discharge: HOME | End: 2024-10-20

## 2024-10-20 LAB
ALBUMIN SERPL-MCNC: 3.6 G/DL (ref 3.5–5.2)
ALP SERPL-CCNC: 102 U/L (ref 40–129)
ALT SERPL-CCNC: 109 U/L (ref 5–41)
AST SERPL-CCNC: 79 U/L
BILIRUB DIRECT SERPL-MCNC: 0.4 MG/DL
BILIRUB INDIRECT SERPL-MCNC: 0.3 MG/DL (ref 0–1)
BILIRUB SERPL-MCNC: 0.7 MG/DL (ref 0.3–1.2)
PROT SERPL-MCNC: 6.3 G/DL (ref 6.4–8.3)

## 2024-10-20 PROCEDURE — 80076 HEPATIC FUNCTION PANEL: CPT

## 2024-10-22 ENCOUNTER — HOSPITAL ENCOUNTER (OUTPATIENT)
Age: 54
Setting detail: SPECIMEN
Discharge: HOME | End: 2024-10-22

## 2024-10-22 LAB
ALBUMIN SERPL-MCNC: 3.3 G/DL (ref 3.5–5.2)
ALP SERPL-CCNC: 93 U/L (ref 40–129)
ALT SERPL-CCNC: 98 U/L (ref 5–41)
ANION GAP SERPL CALCULATED.3IONS-SCNC: 9 MMOL/L (ref 9–17)
AST SERPL-CCNC: 62 U/L
BASOPHILS # BLD: 0.03 K/UL (ref 0–0.2)
BASOPHILS NFR BLD: 1 % (ref 0–2)
BILIRUB SERPL-MCNC: 0.9 MG/DL (ref 0.3–1.2)
BUN SERPL-MCNC: 22 MG/DL (ref 6–20)
BUN/CREAT SERPL: 31 (ref 9–20)
CALCIUM SERPL-MCNC: 9 MG/DL (ref 8.6–10.4)
CHLORIDE SERPL-SCNC: 103 MMOL/L (ref 98–107)
CO2 SERPL-SCNC: 27 MMOL/L (ref 20–31)
CREAT SERPL-MCNC: 0.7 MG/DL (ref 0.7–1.2)
EOSINOPHIL # BLD: 0.17 K/UL (ref 0–0.44)
EOSINOPHILS RELATIVE PERCENT: 5 % (ref 1–4)
ERYTHROCYTE [DISTWIDTH] IN BLOOD BY AUTOMATED COUNT: 12.3 % (ref 11.8–14.4)
GFR, ESTIMATED: >90 ML/MIN/1.73M2
GLUCOSE SERPL-MCNC: 87 MG/DL (ref 70–99)
HCT VFR BLD AUTO: 39.7 % (ref 40.7–50.3)
HGB BLD-MCNC: 13.4 G/DL (ref 13–17)
IMM GRANULOCYTES # BLD AUTO: 0 K/UL (ref 0–0.3)
IMM GRANULOCYTES NFR BLD: 0 %
LYMPHOCYTES NFR BLD: 2.11 K/UL (ref 1.1–3.7)
LYMPHOCYTES RELATIVE PERCENT: 62 % (ref 24–43)
MCH RBC QN AUTO: 32.6 PG (ref 25.2–33.5)
MCHC RBC AUTO-ENTMCNC: 33.8 G/DL (ref 28.4–34.8)
MCV RBC AUTO: 96.6 FL (ref 82.6–102.9)
MONOCYTES NFR BLD: 0.41 K/UL (ref 0.1–1.2)
MONOCYTES NFR BLD: 12 % (ref 3–12)
NEUTROPHILS NFR BLD: 20 % (ref 36–65)
NEUTS SEG NFR BLD: 0.68 K/UL (ref 1.5–8.1)
NRBC BLD-RTO: 0 PER 100 WBC
PLATELET # BLD AUTO: 232 K/UL (ref 138–453)
PMV BLD AUTO: 10.4 FL (ref 8.1–13.5)
POTASSIUM SERPL-SCNC: 4.4 MMOL/L (ref 3.7–5.3)
PROT SERPL-MCNC: 5.6 G/DL (ref 6.4–8.3)
RBC # BLD AUTO: 4.11 M/UL (ref 4.21–5.77)
SODIUM SERPL-SCNC: 139 MMOL/L (ref 135–144)
WBC OTHER # BLD: 3.4 K/UL (ref 3.5–11.3)

## 2024-10-22 PROCEDURE — 36415 COLL VENOUS BLD VENIPUNCTURE: CPT

## 2024-10-22 PROCEDURE — 85025 COMPLETE CBC W/AUTO DIFF WBC: CPT

## 2024-10-22 PROCEDURE — P9603 ONE-WAY ALLOW PRORATED MILES: HCPCS

## 2024-10-22 PROCEDURE — 80053 COMPREHEN METABOLIC PANEL: CPT

## 2024-10-25 ENCOUNTER — HOSPITAL ENCOUNTER (OUTPATIENT)
Age: 54
Setting detail: SPECIMEN
Discharge: HOME | End: 2024-10-25
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
## 2024-10-25 LAB
ALBUMIN SERPL-MCNC: 3.7 G/DL (ref 3.5–5.2)
ALP SERPL-CCNC: 106 U/L (ref 40–129)
ALT SERPL-CCNC: 110 U/L (ref 5–41)
ANION GAP SERPL CALCULATED.3IONS-SCNC: 12 MMOL/L (ref 9–17)
AST SERPL-CCNC: 74 U/L
BASOPHILS # BLD: 0.04 K/UL (ref 0–0.2)
BASOPHILS NFR BLD: 1 %
BILIRUB SERPL-MCNC: 0.8 MG/DL (ref 0.3–1.2)
BUN SERPL-MCNC: 20 MG/DL (ref 6–20)
BUN/CREAT SERPL: 25 (ref 9–20)
CALCIUM SERPL-MCNC: 9.1 MG/DL (ref 8.6–10.4)
CHLORIDE SERPL-SCNC: 104 MMOL/L (ref 98–107)
CO2 SERPL-SCNC: 24 MMOL/L (ref 20–31)
CREAT SERPL-MCNC: 0.8 MG/DL (ref 0.7–1.2)
EOSINOPHIL # BLD: 0.22 K/UL (ref 0–0.4)
EOSINOPHILS RELATIVE PERCENT: 6 % (ref 1–4)
ERYTHROCYTE [DISTWIDTH] IN BLOOD BY AUTOMATED COUNT: 12.2 % (ref 11.8–14.4)
GFR, ESTIMATED: >90 ML/MIN/1.73M2
GLUCOSE SERPL-MCNC: 98 MG/DL (ref 70–99)
HCT VFR BLD AUTO: 43.8 % (ref 40.7–50.3)
HGB BLD-MCNC: 14.5 G/DL (ref 13–17)
IMM GRANULOCYTES # BLD AUTO: 0 K/UL (ref 0–0.3)
IMM GRANULOCYTES NFR BLD: 0 %
LYMPHOCYTES NFR BLD: 1.99 K/UL (ref 1–4.8)
LYMPHOCYTES RELATIVE PERCENT: 54 % (ref 24–44)
MCH RBC QN AUTO: 32.3 PG (ref 25.2–33.5)
MCHC RBC AUTO-ENTMCNC: 33.1 G/DL (ref 28.4–34.8)
MCV RBC AUTO: 97.6 FL (ref 82.6–102.9)
MONOCYTES NFR BLD: 0.56 K/UL (ref 0.2–0.8)
MONOCYTES NFR BLD: 15 % (ref 1–7)
NEUTROPHILS NFR BLD: 24 % (ref 36–66)
NEUTS SEG NFR BLD: 0.89 K/UL (ref 1.8–7.7)
NRBC BLD-RTO: 0 PER 100 WBC
PLATELET # BLD AUTO: 230 K/UL (ref 138–453)
PMV BLD AUTO: 11 FL (ref 8.1–13.5)
POTASSIUM SERPL-SCNC: 4.6 MMOL/L (ref 3.7–5.3)
PROT SERPL-MCNC: 6.4 G/DL (ref 6.4–8.3)
RBC # BLD AUTO: 4.49 M/UL (ref 4.21–5.77)
SODIUM SERPL-SCNC: 140 MMOL/L (ref 135–144)
WBC OTHER # BLD: 3.7 K/UL (ref 3.5–11.3)

## 2024-10-25 PROCEDURE — 80053 COMPREHEN METABOLIC PANEL: CPT

## 2024-10-25 PROCEDURE — 85025 COMPLETE CBC W/AUTO DIFF WBC: CPT

## 2024-10-25 PROCEDURE — 36415 COLL VENOUS BLD VENIPUNCTURE: CPT

## 2024-10-25 PROCEDURE — P9603 ONE-WAY ALLOW PRORATED MILES: HCPCS

## 2024-10-29 ENCOUNTER — HOSPITAL ENCOUNTER (OUTPATIENT)
Age: 54
Setting detail: SPECIMEN
Discharge: HOME | End: 2024-10-29

## 2024-10-29 LAB
ALBUMIN SERPL-MCNC: 3.2 G/DL (ref 3.5–5.2)
ALP SERPL-CCNC: 101 U/L (ref 40–129)
ALT SERPL-CCNC: 87 U/L (ref 5–41)
ANION GAP SERPL CALCULATED.3IONS-SCNC: 7 MMOL/L (ref 9–17)
AST SERPL-CCNC: 67 U/L
BASOPHILS # BLD: <0.03 K/UL (ref 0–0.2)
BASOPHILS NFR BLD: 1 % (ref 0–2)
BILIRUB SERPL-MCNC: 0.5 MG/DL (ref 0.3–1.2)
BUN SERPL-MCNC: 20 MG/DL (ref 6–20)
BUN/CREAT SERPL: 29 (ref 9–20)
CALCIUM SERPL-MCNC: 8.8 MG/DL (ref 8.6–10.4)
CHLORIDE SERPL-SCNC: 104 MMOL/L (ref 98–107)
CO2 SERPL-SCNC: 27 MMOL/L (ref 20–31)
CREAT SERPL-MCNC: 0.7 MG/DL (ref 0.7–1.2)
EOSINOPHIL # BLD: 0.25 K/UL (ref 0–0.44)
EOSINOPHILS RELATIVE PERCENT: 6 % (ref 1–4)
ERYTHROCYTE [DISTWIDTH] IN BLOOD BY AUTOMATED COUNT: 12.2 % (ref 11.8–14.4)
GFR, ESTIMATED: >90 ML/MIN/1.73M2
GLUCOSE SERPL-MCNC: 101 MG/DL (ref 70–99)
HCT VFR BLD AUTO: 38.4 % (ref 40.7–50.3)
HGB BLD-MCNC: 13 G/DL (ref 13–17)
IMM GRANULOCYTES # BLD AUTO: 0.01 K/UL (ref 0–0.3)
IMM GRANULOCYTES NFR BLD: 0 %
LYMPHOCYTES NFR BLD: 1.92 K/UL (ref 1.1–3.7)
LYMPHOCYTES RELATIVE PERCENT: 45 % (ref 24–43)
MCH RBC QN AUTO: 32.7 PG (ref 25.2–33.5)
MCHC RBC AUTO-ENTMCNC: 33.9 G/DL (ref 28.4–34.8)
MCV RBC AUTO: 96.7 FL (ref 82.6–102.9)
MONOCYTES NFR BLD: 0.64 K/UL (ref 0.1–1.2)
MONOCYTES NFR BLD: 15 % (ref 3–12)
NEUTROPHILS NFR BLD: 33 % (ref 36–65)
NEUTS SEG NFR BLD: 1.37 K/UL (ref 1.5–8.1)
NRBC BLD-RTO: 0 PER 100 WBC
PLATELET # BLD AUTO: 216 K/UL (ref 138–453)
PMV BLD AUTO: 10.4 FL (ref 8.1–13.5)
POTASSIUM SERPL-SCNC: 4.3 MMOL/L (ref 3.7–5.3)
PROT SERPL-MCNC: 5.6 G/DL (ref 6.4–8.3)
RBC # BLD AUTO: 3.97 M/UL (ref 4.21–5.77)
SODIUM SERPL-SCNC: 138 MMOL/L (ref 135–144)
WBC OTHER # BLD: 4.2 K/UL (ref 3.5–11.3)

## 2024-10-29 PROCEDURE — 36415 COLL VENOUS BLD VENIPUNCTURE: CPT

## 2024-10-29 PROCEDURE — 85025 COMPLETE CBC W/AUTO DIFF WBC: CPT

## 2024-10-29 PROCEDURE — 80053 COMPREHEN METABOLIC PANEL: CPT

## 2024-10-29 PROCEDURE — P9603 ONE-WAY ALLOW PRORATED MILES: HCPCS

## 2024-11-12 ENCOUNTER — HOSPITAL ENCOUNTER (OUTPATIENT)
Age: 54
Discharge: HOME | End: 2024-11-14
Payer: COMMERCIAL

## 2024-11-12 ENCOUNTER — HOSPITAL ENCOUNTER (OUTPATIENT)
Dept: GENERAL RADIOLOGY | Age: 54
Discharge: HOME | End: 2024-11-14
Payer: COMMERCIAL

## 2024-11-12 ENCOUNTER — OFFICE VISIT (OUTPATIENT)
Dept: NEUROSURGERY | Age: 54
End: 2024-11-12

## 2024-11-12 VITALS
WEIGHT: 210 LBS | DIASTOLIC BLOOD PRESSURE: 105 MMHG | HEIGHT: 74 IN | HEART RATE: 86 BPM | SYSTOLIC BLOOD PRESSURE: 142 MMHG | BODY MASS INDEX: 26.95 KG/M2

## 2024-11-12 DIAGNOSIS — Z98.1 S/P CERVICAL SPINAL FUSION: Primary | ICD-10-CM

## 2024-11-12 DIAGNOSIS — S14.105D C5-C7 LEVEL SPINAL CORD INJURY, SUBSEQUENT ENCOUNTER (HCC): ICD-10-CM

## 2024-11-12 DIAGNOSIS — S14.129D CENTRAL CORD SYNDROME, SUBSEQUENT ENCOUNTER (HCC): ICD-10-CM

## 2024-11-12 DIAGNOSIS — Z98.1 S/P CERVICAL SPINAL FUSION: ICD-10-CM

## 2024-11-12 PROCEDURE — 72050 X-RAY EXAM NECK SPINE 4/5VWS: CPT

## 2024-11-12 RX ORDER — OXYCODONE HYDROCHLORIDE 5 MG/1
5 TABLET ORAL EVERY 6 HOURS PRN
COMMUNITY
Start: 2024-10-28

## 2024-11-12 RX ORDER — CYCLOBENZAPRINE HCL 10 MG
10 TABLET ORAL 3 TIMES DAILY
COMMUNITY
Start: 2024-10-28

## 2024-11-12 NOTE — PROGRESS NOTES
Department of Neurosurgery                                                      Follow up visit      History Obtained From:  patient, domestic partner    CHIEF COMPLAINT:         Chief Complaint   Patient presents with    Post-Op Check     Pt reports he is still having neck pain but it is better than before, and has occasional back pain around his thoracic spine.  Does have XR to review.       HISTORY OF PRESENT ILLNESS:       The patient is a 54 y.o. male who presents for follow up for S/P cervical spinal fusion (09/25/2024), C6-C7 level spinal cord injury, and central cord syndrome.    Patient reports that he is experiencing a moderate amount of pain in the neck which he says has been progressively improving since surgery. He presents to today's visit utilizing a cervical collar. He is also using a wheelchair to assist with ambulation. The strength in the left lower extremity is worse than the right though he endorses improving strength. Prior to surgery the patient exclaims that he was unable to move his left leg and left hand with less severe loss of function on the right side. Lauro describes his left side as feeling swollen and heavy without significant numbness, though he does admit to decreased sensation on the right compared to left. He says that he is able to ambulate with a cane and through some assistance of a strap attached to his leg. He is able to walk without any ambulatory assistance if he maintains a slow gait. He denies any pain when he walks. Sensation and function of the left hand is also improving. He is now able to close and open his left hand where he reports he was unable to just 3 weeks ago. Ludwig experiences significantly improved function in the right upper and lower extremities, now having more function than the right. He is in physical therapy at this time and endorses good progress and is utilizing learned PT exercises at home.    PAST MEDICAL HISTORY :       Past Medical

## 2024-11-26 ENCOUNTER — OFFICE VISIT (OUTPATIENT)
Age: 54
End: 2024-11-26
Payer: COMMERCIAL

## 2024-11-26 VITALS
HEIGHT: 74 IN | RESPIRATION RATE: 22 BRPM | TEMPERATURE: 97.9 F | HEART RATE: 112 BPM | WEIGHT: 195 LBS | OXYGEN SATURATION: 97 % | DIASTOLIC BLOOD PRESSURE: 86 MMHG | BODY MASS INDEX: 25.03 KG/M2 | SYSTOLIC BLOOD PRESSURE: 128 MMHG

## 2024-11-26 DIAGNOSIS — F17.219 CIGARETTE NICOTINE DEPENDENCE WITH NICOTINE-INDUCED DISORDER: ICD-10-CM

## 2024-11-26 DIAGNOSIS — Z98.1 S/P CERVICAL SPINAL FUSION: ICD-10-CM

## 2024-11-26 DIAGNOSIS — S14.105S C5-C7 LEVEL SPINAL CORD INJURY, SEQUELA (HCC): ICD-10-CM

## 2024-11-26 DIAGNOSIS — K21.9 GASTROESOPHAGEAL REFLUX DISEASE WITHOUT ESOPHAGITIS: ICD-10-CM

## 2024-11-26 DIAGNOSIS — R29.898 ARM WEAKNESS: ICD-10-CM

## 2024-11-26 DIAGNOSIS — B19.20 HEPATITIS C VIRUS INFECTION WITHOUT HEPATIC COMA, UNSPECIFIED CHRONICITY: ICD-10-CM

## 2024-11-26 DIAGNOSIS — M25.571 ARTHRALGIA OF RIGHT ANKLE: Primary | ICD-10-CM

## 2024-11-26 DIAGNOSIS — S14.129D CENTRAL CORD SYNDROME, SUBSEQUENT ENCOUNTER (HCC): ICD-10-CM

## 2024-11-26 DIAGNOSIS — M21.371 FOOT DROP, RIGHT FOOT: ICD-10-CM

## 2024-11-26 PROCEDURE — 3017F COLORECTAL CA SCREEN DOC REV: CPT | Performed by: FAMILY MEDICINE

## 2024-11-26 PROCEDURE — G8419 CALC BMI OUT NRM PARAM NOF/U: HCPCS | Performed by: FAMILY MEDICINE

## 2024-11-26 PROCEDURE — 4004F PT TOBACCO SCREEN RCVD TLK: CPT | Performed by: FAMILY MEDICINE

## 2024-11-26 PROCEDURE — G8427 DOCREV CUR MEDS BY ELIG CLIN: HCPCS | Performed by: FAMILY MEDICINE

## 2024-11-26 PROCEDURE — G8484 FLU IMMUNIZE NO ADMIN: HCPCS | Performed by: FAMILY MEDICINE

## 2024-11-26 PROCEDURE — 99205 OFFICE O/P NEW HI 60 MIN: CPT | Performed by: FAMILY MEDICINE

## 2024-11-26 SDOH — ECONOMIC STABILITY: FOOD INSECURITY: WITHIN THE PAST 12 MONTHS, THE FOOD YOU BOUGHT JUST DIDN'T LAST AND YOU DIDN'T HAVE MONEY TO GET MORE.: NEVER TRUE

## 2024-11-26 SDOH — ECONOMIC STABILITY: INCOME INSECURITY: HOW HARD IS IT FOR YOU TO PAY FOR THE VERY BASICS LIKE FOOD, HOUSING, MEDICAL CARE, AND HEATING?: NOT HARD AT ALL

## 2024-11-26 SDOH — ECONOMIC STABILITY: FOOD INSECURITY: WITHIN THE PAST 12 MONTHS, YOU WORRIED THAT YOUR FOOD WOULD RUN OUT BEFORE YOU GOT MONEY TO BUY MORE.: NEVER TRUE

## 2024-11-26 ASSESSMENT — PATIENT HEALTH QUESTIONNAIRE - PHQ9
2. FEELING DOWN, DEPRESSED OR HOPELESS: NOT AT ALL
SUM OF ALL RESPONSES TO PHQ9 QUESTIONS 1 & 2: 0
SUM OF ALL RESPONSES TO PHQ QUESTIONS 1-9: 0
SUM OF ALL RESPONSES TO PHQ QUESTIONS 1-9: 0
1. LITTLE INTEREST OR PLEASURE IN DOING THINGS: NOT AT ALL
SUM OF ALL RESPONSES TO PHQ QUESTIONS 1-9: 0
SUM OF ALL RESPONSES TO PHQ QUESTIONS 1-9: 0

## 2024-11-26 NOTE — PROGRESS NOTES
automatedtranscription, some errors in transcription may have occurred.     Electronically signed by RICHARD MITCHELL DO FAAFP DAB , 11/26/2024, 6:42 PM

## 2024-12-05 RX ORDER — OMEPRAZOLE 40 MG/1
40 CAPSULE, DELAYED RELEASE ORAL DAILY
Qty: 30 CAPSULE | Refills: 2 | Status: SHIPPED | OUTPATIENT
Start: 2024-12-05

## 2024-12-05 NOTE — TELEPHONE ENCOUNTER
Lauro Munroe is calling to request a refill on the following medication(s):    Medication Request:  Requested Prescriptions     Pending Prescriptions Disp Refills    omeprazole (PRILOSEC) 40 MG delayed release capsule 30 capsule 2     Sig: Take 1 capsule by mouth daily       Last Visit Date (If Applicable):  11/26/2024    Next Visit Date:    Visit date not found

## 2024-12-06 ENCOUNTER — TELEPHONE (OUTPATIENT)
Dept: GASTROENTEROLOGY | Age: 54
End: 2024-12-06

## 2024-12-06 ENCOUNTER — TELEPHONE (OUTPATIENT)
Dept: NEUROSURGERY | Age: 54
End: 2024-12-06

## 2024-12-06 NOTE — TELEPHONE ENCOUNTER
Patient called and stated he needs a letter from child support saying he is not able to work at this time. Please advise

## 2024-12-06 NOTE — TELEPHONE ENCOUNTER
NP/Hepatitis C virus infection without hepatic coma, unspecified chronicity/[place]  1st attempt- Called pt and LVM to call the office.  2nd attempt- Sent NP letter.

## 2024-12-09 NOTE — TELEPHONE ENCOUNTER
Letter created, attempted to contact patient to see where it needs to be sent, LVM.    As writer was documenting call, pt returned call. Pt requests letter to be mailed to his home.

## 2024-12-13 ENCOUNTER — TELEPHONE (OUTPATIENT)
Dept: NEUROSURGERY | Age: 54
End: 2024-12-13

## 2024-12-13 DIAGNOSIS — S14.105D C5-C7 LEVEL SPINAL CORD INJURY, SUBSEQUENT ENCOUNTER (HCC): ICD-10-CM

## 2024-12-13 DIAGNOSIS — S14.105D C5-C7 LEVEL SPINAL CORD INJURY, SUBSEQUENT ENCOUNTER (HCC): Primary | ICD-10-CM

## 2024-12-13 DIAGNOSIS — Z98.1 S/P CERVICAL SPINAL FUSION: ICD-10-CM

## 2024-12-13 DIAGNOSIS — Z98.1 S/P CERVICAL SPINAL FUSION: Primary | ICD-10-CM

## 2024-12-13 DIAGNOSIS — S14.129D CENTRAL CORD SYNDROME, SUBSEQUENT ENCOUNTER (HCC): ICD-10-CM

## 2024-12-13 RX ORDER — OXYCODONE HYDROCHLORIDE 5 MG/1
5 TABLET ORAL 2 TIMES DAILY PRN
Qty: 14 TABLET | Refills: 0 | Status: SHIPPED | OUTPATIENT
Start: 2024-12-13 | End: 2024-12-20

## 2024-12-13 RX ORDER — CYCLOBENZAPRINE HCL 10 MG
10 TABLET ORAL 3 TIMES DAILY
Qty: 21 TABLET | Refills: 0 | Status: SHIPPED | OUTPATIENT
Start: 2024-12-13 | End: 2024-12-20

## 2024-12-13 NOTE — TELEPHONE ENCOUNTER
Prescription sent, recommend decreasing use as tolerated.  We typically will manage pain medication for a limited time following surgery, if it seems as though it is a medication that will be required longer-term, recommend that we get the patient set up to see pain management.

## 2024-12-13 NOTE — TELEPHONE ENCOUNTER
Handicap placard reordered with expiration date.  This can be mailed to patient or picked up as preferred.  Flexeril sent to the pharmacy.  Please verify what patient is currently taking for pain.  Has not received any oxycodone prescription since October.  Looks like PCP recently ordered Tylenol.  If still requiring oxycodone, please verify dose, frequency and who has been prescribing thus far.  Thank you.

## 2024-12-13 NOTE — TELEPHONE ENCOUNTER
I spoke with pt and he stated he believes the oxycodone was prescribed by the rehab he was in and  he stated 5 mg every 4 hours as needed for pain. I faxed the handicap placard.

## 2024-12-13 NOTE — TELEPHONE ENCOUNTER
Patient called and stated he needs a refill of oxycodone and flexeril to be sent to his pharmacy. Pt also stated he needs a updated handicap placard because the [facility] needs a expiration date on the script([phone]). Please advise.

## 2024-12-19 ENCOUNTER — OFFICE VISIT (OUTPATIENT)
Dept: GASTROENTEROLOGY | Age: 54
End: 2024-12-19
Payer: COMMERCIAL

## 2024-12-19 VITALS
DIASTOLIC BLOOD PRESSURE: 91 MMHG | BODY MASS INDEX: 26.06 KG/M2 | SYSTOLIC BLOOD PRESSURE: 135 MMHG | TEMPERATURE: 97.3 F | WEIGHT: 203 LBS

## 2024-12-19 DIAGNOSIS — B19.20 HEPATITIS C VIRUS INFECTION WITHOUT HEPATIC COMA, UNSPECIFIED CHRONICITY: Primary | ICD-10-CM

## 2024-12-19 DIAGNOSIS — Z98.890 H/O CERVICAL SPINE SURGERY: ICD-10-CM

## 2024-12-19 DIAGNOSIS — K58.2 IRRITABLE BOWEL SYNDROME WITH BOTH CONSTIPATION AND DIARRHEA: ICD-10-CM

## 2024-12-19 DIAGNOSIS — Z11.59 SCREENING FOR VIRAL DISEASE: ICD-10-CM

## 2024-12-19 DIAGNOSIS — L81.8 EXTENSIVE TATTOOS: ICD-10-CM

## 2024-12-19 DIAGNOSIS — F19.11 HX OF DRUG ABUSE (HCC): ICD-10-CM

## 2024-12-19 DIAGNOSIS — K21.9 GASTROESOPHAGEAL REFLUX DISEASE, UNSPECIFIED WHETHER ESOPHAGITIS PRESENT: ICD-10-CM

## 2024-12-19 PROCEDURE — G8484 FLU IMMUNIZE NO ADMIN: HCPCS | Performed by: INTERNAL MEDICINE

## 2024-12-19 PROCEDURE — G8427 DOCREV CUR MEDS BY ELIG CLIN: HCPCS | Performed by: INTERNAL MEDICINE

## 2024-12-19 PROCEDURE — 3017F COLORECTAL CA SCREEN DOC REV: CPT | Performed by: INTERNAL MEDICINE

## 2024-12-19 PROCEDURE — 4004F PT TOBACCO SCREEN RCVD TLK: CPT | Performed by: INTERNAL MEDICINE

## 2024-12-19 PROCEDURE — 99204 OFFICE O/P NEW MOD 45 MIN: CPT | Performed by: INTERNAL MEDICINE

## 2024-12-19 PROCEDURE — G8419 CALC BMI OUT NRM PARAM NOF/U: HCPCS | Performed by: INTERNAL MEDICINE

## 2024-12-19 ASSESSMENT — ENCOUNTER SYMPTOMS
COUGH: 0
DIARRHEA: 0
ABDOMINAL PAIN: 0
BLOOD IN STOOL: 0
SORE THROAT: 0
CHOKING: 0
ANAL BLEEDING: 0
WHEEZING: 0
NAUSEA: 0
VOMITING: 0
SHORTNESS OF BREATH: 0
TROUBLE SWALLOWING: 0
COLOR CHANGE: 0
CONSTIPATION: 0
ABDOMINAL DISTENTION: 0
RECTAL PAIN: 0

## 2024-12-19 NOTE — PROGRESS NOTES
GI NEW PATIENT OFFICE VISIT    INTERVAL HISTORY:   Татьяна Montana DO  [address]    [address] [place] [address]    Chief Complaint   Patient presents with    Hepatitis C     Patient is here today as a NP to discuss screening for Hep C.       1. Hepatitis C virus infection without hepatic coma, unspecified chronicity    2. Screening for viral disease    3. Hx of drug abuse (HCC)    4. Extensive tattoos    5. Gastroesophageal reflux disease, unspecified whether esophagitis present    6. Irritable bowel syndrome with both constipation and diarrhea    7. H/O cervical spine surgery      This patient seen my office for the first time  He has history significant for drug abuse long time ago  He was in [facility] and had multiple tattoos placed  He believes that gave him hepatitis C    He is known to have hep C for a long time    Never got it treated    Has some weakness and fatigue symptoms    Denies any rectal bleeding or melena    Complains of some GERD symptoms    Denies any dysphagia    Mild nausea no vomiting    Denies any current alcohol abuse or drug abuse    Available records reviewed with him    HISTORY OF PRESENT ILLNESS: Mr.Ernesto Munroe is a 54 y.o. male with a past history remarkable for , referred for evaluation of   Chief Complaint   Patient presents with    Hepatitis C     Patient is here today as a NP to discuss screening for Hep C.   .    Past Medical,Family, and Social History reviewed and does contribute to the patient presenting condition.    Patient's PMH/PSH,SH,PSYCH Hx, MEDs, ALLERGIES, and ROS were all reviewed and updated in the appropriate sections.    PAST MEDICAL HISTORY:  Past Medical History:   Diagnosis Date    GERD (gastroesophageal reflux disease)     Overdose     7/10/23 Cocaine and meth       Past Surgical History:   Procedure Laterality Date    CERVICAL FUSION N/A 9/25/2024    C3-C5 LAMINECTOMY, C6-C7 LAMINOTOMY, C3-C5 FUSION performed by Linda Mckeon DO at [facility] OR    CERVICAL

## 2024-12-26 ENCOUNTER — OFFICE VISIT (OUTPATIENT)
Dept: PHYSICAL MEDICINE AND REHAB | Age: 54
End: 2024-12-26
Payer: COMMERCIAL

## 2024-12-26 VITALS
BODY MASS INDEX: 26.24 KG/M2 | TEMPERATURE: 97.8 F | WEIGHT: 204.4 LBS | SYSTOLIC BLOOD PRESSURE: 137 MMHG | DIASTOLIC BLOOD PRESSURE: 99 MMHG | HEART RATE: 103 BPM

## 2024-12-26 DIAGNOSIS — S14.129D CENTRAL CORD SYNDROME, SUBSEQUENT ENCOUNTER (HCC): Primary | ICD-10-CM

## 2024-12-26 DIAGNOSIS — R25.2 SPASTICITY: ICD-10-CM

## 2024-12-26 DIAGNOSIS — S14.105D C5-C7 LEVEL SPINAL CORD INJURY, SUBSEQUENT ENCOUNTER (HCC): ICD-10-CM

## 2024-12-26 PROCEDURE — G8484 FLU IMMUNIZE NO ADMIN: HCPCS | Performed by: PHYSICAL MEDICINE & REHABILITATION

## 2024-12-26 PROCEDURE — G8427 DOCREV CUR MEDS BY ELIG CLIN: HCPCS | Performed by: PHYSICAL MEDICINE & REHABILITATION

## 2024-12-26 PROCEDURE — 4004F PT TOBACCO SCREEN RCVD TLK: CPT | Performed by: PHYSICAL MEDICINE & REHABILITATION

## 2024-12-26 PROCEDURE — 3017F COLORECTAL CA SCREEN DOC REV: CPT | Performed by: PHYSICAL MEDICINE & REHABILITATION

## 2024-12-26 PROCEDURE — 99214 OFFICE O/P EST MOD 30 MIN: CPT | Performed by: PHYSICAL MEDICINE & REHABILITATION

## 2024-12-26 PROCEDURE — G8419 CALC BMI OUT NRM PARAM NOF/U: HCPCS | Performed by: PHYSICAL MEDICINE & REHABILITATION

## 2024-12-26 RX ORDER — GABAPENTIN 300 MG/1
900 CAPSULE ORAL 3 TIMES DAILY
Qty: 270 CAPSULE | Refills: 2 | Status: SHIPPED | OUTPATIENT
Start: 2024-12-26 | End: 2025-03-26

## 2024-12-26 RX ORDER — BACLOFEN 5 MG/1
5 TABLET ORAL 3 TIMES DAILY
Qty: 90 TABLET | Refills: 2 | Status: SHIPPED | OUTPATIENT
Start: 2024-12-26

## 2024-12-26 NOTE — PROGRESS NOTES
Thought content normal.   Medical assistant note and vitals for today's encounter reviewed.    Diagnostic Studies:      Latest Reference Range & Units 10/29/24 08:03   Sodium 135 - 144 mmol/L 138   Potassium 3.7 - 5.3 mmol/L 4.3   Chloride 98 - 107 mmol/L 104   CARBON DIOXIDE 20 - 31 mmol/L 27   BUN,BUNPL 6 - 20 mg/dL 20   Creatinine 0.7 - 1.2 mg/dL 0.7   Bun/Cre 9 - 20  29 (H)   Anion Gap 9 - 17 mmol/L 7 (L)   Est, Glom Filt Rate >60 mL/min/1.73m2 >90   Glucose 70 - 99 mg/dL 101 (H)   Calcium 8.6 - 10.4 mg/dL 8.8   Total Protein 6.4 - 8.3 g/dL 5.6 (L)   Albumin 3.5 - 5.2 g/dL 3.2 (L)   Alkaline Phosphatase 40 - 129 U/L 101   ALT 5 - 41 U/L 87 (H)   AST <40 U/L 67 (H)   Total Bilirubin 0.3 - 1.2 mg/dL 0.5   (H): Data is abnormally high  (L): Data is abnormally low    Assessment:       Diagnosis Orders   1. Central cord syndrome, subsequent encounter (HCC)        2. C5-C7 level spinal cord injury, subsequent encounter ([facility])        3. Spasticity             Plan:   Assessment & Plan     1. Central cord syndrome, subsequent encounter (HCC)  Chronic. Improving. Continues with outpatient PT/OT at [facility]. Resume gabapentin 900 mg TID.     2. C5-C7 level spinal cord injury, subsequent encounter (HCC)  Chronic. Stable. Has necessary DME. Power wheelchair is pending but paperwork was initiated at [facility]. May need seating clinic evaluation if unsuccessful at obtaining power device.   Referred him to [employer] for peer support. Discussed possible medication or counseling intervention for depressed mood and he will consider    3. Spasticity  Intermittent. Not treated. Will initiate low dose baclofen and monitor response and liver function closely. He will have labs drawn next week - gastro ordered CMP.       No orders of the defined types were placed in this encounter.    Orders Placed This Encounter   Medications    gabapentin (NEURONTIN) 300 MG capsule     Sig: Take 3 capsules by mouth 3 times daily

## 2025-01-10 ENCOUNTER — TELEPHONE (OUTPATIENT)
Dept: PHYSICAL MEDICINE AND REHAB | Age: 55
End: 2025-01-10

## 2025-01-10 NOTE — TELEPHONE ENCOUNTER
Pt called in stating that he has been taking the Gabapentin 300 mg 3 caps TID abd the Baclofen 5 mg TID and is not getting any relief. He is still having pain and spasticity. He is asking if we can increase his medication and for something for pain.    He also told me that the appeal for his power wheelchair was denied. Did you want me to schedule him for the wheelchair clinic? Please advise.

## 2025-01-13 NOTE — TELEPHONE ENCOUNTER
I spoke with the patient and he is going to try and get the blood work done. He is also scheduled for the seating clinic.

## 2025-01-18 PROBLEM — Z11.59 SCREENING FOR VIRAL DISEASE: Status: RESOLVED | Noted: 2024-12-19 | Resolved: 2025-01-18

## 2025-01-21 ENCOUNTER — TELEPHONE (OUTPATIENT)
Dept: PHYSICAL MEDICINE AND REHAB | Age: 55
End: 2025-01-21

## 2025-01-21 DIAGNOSIS — S14.105D C5-C7 LEVEL SPINAL CORD INJURY, SUBSEQUENT ENCOUNTER (HCC): ICD-10-CM

## 2025-01-21 DIAGNOSIS — S14.129D CENTRAL CORD SYNDROME, SUBSEQUENT ENCOUNTER (HCC): Primary | ICD-10-CM

## 2025-01-21 DIAGNOSIS — R25.2 SPASTICITY: ICD-10-CM

## 2025-01-21 NOTE — TELEPHONE ENCOUNTER
Lauor called in requesting to speak with you. I let him know that you are currently not in our [place] office and asked to assist. He was discharged for cancellations from OT and PT. He didn't know there was a cancellation policy and he was cancelling due to weather since his transportation (mom and dad) are over 70 years old and his sister works a lot. He was told by Dionisio at [facility] to ask for these orders. He is also going to call his insurance company to see if they can provide wheelchair transportation to these appointments so it doesn't happen in the future. I gave him member services number on the back of his insurance card for [employer].     He also stated he will be getting the lab work done that was requested by what looks like another provider tomorrow. He was also stating that the gabapentin and baclofen you prescribed is not helping with the nerve pain. He mentioned [facility] prescribing something different, but didn't remember what it was. This looks like it was addressed on 1/13/25.    Are you ok with ordering another round of OT/PT through [facility]?     Patient will be in wheelchair clinic 2/13. Next follow up 4/16.

## 2025-01-21 NOTE — TELEPHONE ENCOUNTER
Faxed orders over to [facility] will follow up with patient later this week.

## 2025-01-22 ENCOUNTER — HOSPITAL ENCOUNTER (OUTPATIENT)
Age: 55
Discharge: HOME | End: 2025-01-22
Payer: COMMERCIAL

## 2025-01-22 DIAGNOSIS — B19.20 HEPATITIS C VIRUS INFECTION WITHOUT HEPATIC COMA, UNSPECIFIED CHRONICITY: ICD-10-CM

## 2025-01-22 DIAGNOSIS — Z11.59 SCREENING FOR VIRAL DISEASE: ICD-10-CM

## 2025-01-22 LAB
ALBUMIN SERPL-MCNC: 4 G/DL (ref 3.5–5.2)
ALP SERPL-CCNC: 82 U/L (ref 40–129)
ALT SERPL-CCNC: 60 U/L (ref 10–50)
ANION GAP SERPL CALCULATED.3IONS-SCNC: 8 MMOL/L (ref 9–16)
AST SERPL-CCNC: 46 U/L (ref 10–50)
BASOPHILS # BLD: 0 K/UL (ref 0–0.2)
BASOPHILS NFR BLD: 1 % (ref 0–2)
BILIRUB DIRECT SERPL-MCNC: 0.3 MG/DL (ref 0–0.3)
BILIRUB INDIRECT SERPL-MCNC: 0.2 MG/DL (ref 0–1)
BILIRUB SERPL-MCNC: 0.5 MG/DL (ref 0–1.2)
BUN SERPL-MCNC: 13 MG/DL (ref 6–20)
CALCIUM SERPL-MCNC: 9.1 MG/DL (ref 8.6–10.4)
CHLORIDE SERPL-SCNC: 106 MMOL/L (ref 98–107)
CO2 SERPL-SCNC: 28 MMOL/L (ref 20–31)
CREAT SERPL-MCNC: 0.9 MG/DL (ref 0.7–1.2)
EOSINOPHIL # BLD: 0.1 K/UL (ref 0–0.4)
EOSINOPHILS RELATIVE PERCENT: 2 % (ref 0–4)
ERYTHROCYTE [DISTWIDTH] IN BLOOD BY AUTOMATED COUNT: 14.1 % (ref 11.5–14.9)
GFR, ESTIMATED: >90 ML/MIN/1.73M2
GLUCOSE SERPL-MCNC: 104 MG/DL (ref 74–99)
HBV SURFACE AB SERPL IA-ACNC: <3.5 MIU/ML
HBV SURFACE AG SERPL QL IA: NONREACTIVE
HCT VFR BLD AUTO: 47.3 % (ref 41–53)
HCV AB SERPL QL IA: REACTIVE
HGB BLD-MCNC: 15.8 G/DL (ref 13.5–17.5)
HIV 1+2 AB+HIV1 P24 AG SERPL QL IA: NONREACTIVE
LYMPHOCYTES NFR BLD: 1.9 K/UL (ref 1–4.8)
LYMPHOCYTES RELATIVE PERCENT: 40 % (ref 24–44)
MCH RBC QN AUTO: 32.8 PG (ref 26–34)
MCHC RBC AUTO-ENTMCNC: 33.4 G/DL (ref 31–37)
MCV RBC AUTO: 98.3 FL (ref 80–100)
MONOCYTES NFR BLD: 0.5 K/UL (ref 0.1–1.3)
MONOCYTES NFR BLD: 11 % (ref 1–7)
NEUTROPHILS NFR BLD: 46 % (ref 36–66)
NEUTS SEG NFR BLD: 2.2 K/UL (ref 1.3–9.1)
PLATELET # BLD AUTO: 236 K/UL (ref 150–450)
PMV BLD AUTO: 8 FL (ref 6–12)
POTASSIUM SERPL-SCNC: 4.8 MMOL/L (ref 3.7–5.3)
PROT SERPL-MCNC: 6.9 G/DL (ref 6.6–8.7)
RBC # BLD AUTO: 4.81 M/UL (ref 4.5–5.9)
SODIUM SERPL-SCNC: 142 MMOL/L (ref 136–145)
WBC OTHER # BLD: 4.6 K/UL (ref 3.5–11)

## 2025-01-22 PROCEDURE — 86317 IMMUNOASSAY INFECTIOUS AGENT: CPT

## 2025-01-22 PROCEDURE — 83516 IMMUNOASSAY NONANTIBODY: CPT

## 2025-01-22 PROCEDURE — 86803 HEPATITIS C AB TEST: CPT

## 2025-01-22 PROCEDURE — 87902 NFCT AGT GNTYP ALYS HEP C: CPT

## 2025-01-22 PROCEDURE — 87340 HEPATITIS B SURFACE AG IA: CPT

## 2025-01-22 PROCEDURE — 87389 HIV-1 AG W/HIV-1&-2 AB AG IA: CPT

## 2025-01-22 PROCEDURE — 82248 BILIRUBIN DIRECT: CPT

## 2025-01-22 PROCEDURE — 86708 HEPATITIS A ANTIBODY: CPT

## 2025-01-22 PROCEDURE — 82977 ASSAY OF GGT: CPT

## 2025-01-22 PROCEDURE — 83883 ASSAY NEPHELOMETRY NOT SPEC: CPT

## 2025-01-22 PROCEDURE — 84520 ASSAY OF UREA NITROGEN: CPT

## 2025-01-22 PROCEDURE — 87522 HEPATITIS C REVRS TRNSCRPJ: CPT

## 2025-01-22 PROCEDURE — 84460 ALANINE AMINO (ALT) (SGPT): CPT

## 2025-01-22 PROCEDURE — 36415 COLL VENOUS BLD VENIPUNCTURE: CPT

## 2025-01-22 PROCEDURE — 80053 COMPREHEN METABOLIC PANEL: CPT

## 2025-01-22 PROCEDURE — 86704 HEP B CORE ANTIBODY TOTAL: CPT

## 2025-01-22 PROCEDURE — 85025 COMPLETE CBC W/AUTO DIFF WBC: CPT

## 2025-01-22 PROCEDURE — 84450 TRANSFERASE (AST) (SGOT): CPT

## 2025-01-23 LAB
HAV AB SERPL IA-ACNC: NONREACTIVE
HBV CORE AB SER QL: NONREACTIVE
MITOCHONDRIA M2 IGG SER-ACNC: 1.1 U/ML (ref 0–4)

## 2025-01-23 RX ORDER — BACLOFEN 5 MG/1
10 TABLET ORAL 3 TIMES DAILY
Qty: 180 TABLET | Refills: 2 | Status: SHIPPED | OUTPATIENT
Start: 2025-01-23

## 2025-01-23 NOTE — TELEPHONE ENCOUNTER
Followed up with patient. He was able to get transportation arranged with his insurance company. So I let him know the orders are faxed over and he can schedule with them.    He is noting pain in his neck, shoulders, and elbows. Saying it's more than just nerves it's joints as well. I told him I would let you know and see what you recommend. He also wanted you to know that he had his lab work completed and it's in his chart for your review.

## 2025-01-24 DIAGNOSIS — B19.20 HEPATITIS C VIRUS INFECTION WITHOUT HEPATIC COMA, UNSPECIFIED CHRONICITY: Primary | ICD-10-CM

## 2025-01-24 LAB
HCV RNA # SERPL NAA+PROBE: DETECTED {COPIES}/ML
SPECIMEN SOURCE: ABNORMAL

## 2025-01-25 LAB
ALANINE AMINOTRANSFERASE, FIBROMETER: 60 U/L (ref 5–50)
ALPHA-2-MACROGLOBULIN, FIBROMETER: 208 MG/DL (ref 131–293)
ASPARTATE AMINOTRANSFERASE, FIBROMETER: 45 U/L (ref 9–50)
CIRRHOMETER PATIENT SCORE: 0.02
EER FIBROMETER REPORT: ABNORMAL
FIBROMETER INTERPRETATION: ABNORMAL
FIBROMETER PATIENT SCORE: 0.58
FIBROMETER PLATELET COUNT: 241
FIBROMETER PROTHROMBIN INDEX: 126 % (ref 90–120)
FIBROSIS METAVIR CLASSIFICATION: ABNORMAL
GAMMA GLUTAMYL TRANSFERASE, FIBROMETER: 261 U/L (ref 7–51)
INFLAMETER METAVIR CLASSIFICATION: ABNORMAL
INFLAMETER PATIENT SCORE: 0.2
UREA NITROGEN, FIBROMETER: 14 MG/DL (ref 7–20)

## 2025-01-27 LAB — HCV GENTYP SERPL NAA+PROBE: NORMAL

## 2025-01-27 RX ORDER — VELPATASVIR AND SOFOSBUVIR 100; 400 MG/1; MG/1
1 TABLET, FILM COATED ORAL DAILY
Qty: 28 TABLET | Refills: 2 | Status: SHIPPED | OUTPATIENT
Start: 2025-01-27

## 2025-01-31 ENCOUNTER — OFFICE VISIT (OUTPATIENT)
Dept: GASTROENTEROLOGY | Age: 55
End: 2025-01-31
Payer: COMMERCIAL

## 2025-01-31 VITALS
WEIGHT: 216 LBS | SYSTOLIC BLOOD PRESSURE: 142 MMHG | DIASTOLIC BLOOD PRESSURE: 93 MMHG | BODY MASS INDEX: 27.73 KG/M2 | TEMPERATURE: 98.6 F

## 2025-01-31 DIAGNOSIS — K21.9 GASTROESOPHAGEAL REFLUX DISEASE, UNSPECIFIED WHETHER ESOPHAGITIS PRESENT: ICD-10-CM

## 2025-01-31 DIAGNOSIS — L81.8 EXTENSIVE TATTOOS: ICD-10-CM

## 2025-01-31 DIAGNOSIS — Z11.59 SCREENING FOR VIRAL DISEASE: ICD-10-CM

## 2025-01-31 DIAGNOSIS — F19.11 HX OF DRUG ABUSE (HCC): ICD-10-CM

## 2025-01-31 DIAGNOSIS — Z98.890 H/O CERVICAL SPINE SURGERY: ICD-10-CM

## 2025-01-31 DIAGNOSIS — K58.2 IRRITABLE BOWEL SYNDROME WITH BOTH CONSTIPATION AND DIARRHEA: ICD-10-CM

## 2025-01-31 DIAGNOSIS — B19.20 HEPATITIS C VIRUS INFECTION WITHOUT HEPATIC COMA, UNSPECIFIED CHRONICITY: Primary | ICD-10-CM

## 2025-01-31 PROCEDURE — 4004F PT TOBACCO SCREEN RCVD TLK: CPT | Performed by: INTERNAL MEDICINE

## 2025-01-31 PROCEDURE — 3017F COLORECTAL CA SCREEN DOC REV: CPT | Performed by: INTERNAL MEDICINE

## 2025-01-31 PROCEDURE — G8427 DOCREV CUR MEDS BY ELIG CLIN: HCPCS | Performed by: INTERNAL MEDICINE

## 2025-01-31 PROCEDURE — 99214 OFFICE O/P EST MOD 30 MIN: CPT | Performed by: INTERNAL MEDICINE

## 2025-01-31 PROCEDURE — G8419 CALC BMI OUT NRM PARAM NOF/U: HCPCS | Performed by: INTERNAL MEDICINE

## 2025-01-31 ASSESSMENT — ENCOUNTER SYMPTOMS
ABDOMINAL DISTENTION: 0
BLOOD IN STOOL: 0
NAUSEA: 0
SHORTNESS OF BREATH: 0
WHEEZING: 0
DIARRHEA: 0
COUGH: 0
VOMITING: 0
TROUBLE SWALLOWING: 0
COLOR CHANGE: 0
ANAL BLEEDING: 0
RECTAL PAIN: 0
CHOKING: 0
SORE THROAT: 0
CONSTIPATION: 0
ABDOMINAL PAIN: 1

## 2025-01-31 NOTE — PROGRESS NOTES
infection without hepatic coma, unspecified chronicity    2. Screening for viral disease    3. Hx of drug abuse (HCC)    4. Irritable bowel syndrome with both constipation and diarrhea    5. Gastroesophageal reflux disease, unspecified whether esophagitis present    6. Extensive tattoos    7. H/O cervical spine surgery        Plan    Start Rx for Hep C    See back in 3 months    Will check LFTs    Cont to avoid drugs and ETOH     Avoid smoking    Avoid ETOH    Pt was advised in detail about some life style and dietary modifications. He was advised about avoidance of caffeine, nicotine and chocolate. Pt was also told to stay away from any kind of fast foods, soda pops. He was also advised to avoid lots of spices, grease and fried food etc.     Instructions were also given about trying to arrange the timing, quality and quantity of food.    Instructions were given about using ample amount of fiber including dietary and supplemental fiber either metamucil, bennafiber or citrucell etc.  Pt was advised about drinking ample amount of water without any colors or chemicals. Stress was given about regular exercise.    Pt has verbalized understanding and agreement to these modifications.      Thank you for allowing me to participate in the care of Mr. Munroe. For any further questions please do not hesitate to contact me.    I have reviewed and agree with the ROS entered by the MA/Nurse.     This note is created with the assistance of the speech recognition program.  While intending to generate a document that actually reflects the content of the visit, document can still have some errors including those of syntax and sound like substitutions which may escape proof reading.  Actual meaning can be extrapolated by contextual diversion.     Komal Santillan MD, FACG  Board Certified in Gastroenterology and Internal Medicine  [facility] Gastroenterology  Office #: [phone]

## 2025-02-03 ENCOUNTER — TELEPHONE (OUTPATIENT)
Dept: PHYSICAL MEDICINE AND REHAB | Age: 55
End: 2025-02-03

## 2025-02-03 NOTE — TELEPHONE ENCOUNTER
The insurance company does not want to cover the Baclofen 5 mg 2 tabs TID. Can we change it to the Baclofen 10 mg TID? There was not a titration dose that would require that dose. Please advise.

## 2025-02-06 ENCOUNTER — TELEPHONE (OUTPATIENT)
Dept: PHYSICAL MEDICINE AND REHAB | Age: 55
End: 2025-02-06

## 2025-02-06 RX ORDER — BACLOFEN 10 MG/1
10 TABLET ORAL 3 TIMES DAILY
COMMUNITY
Start: 2025-02-06

## 2025-02-06 NOTE — TELEPHONE ENCOUNTER
Spoke with dr. Aranda.  The baclofen 5 mg 2 tabs 3 times per day was sent in error after records review.       I called the pharmacy, [facility] at [phone], cancelled the above baclofen and called in the correct dosage and strength of:  baclofen 10 mg 1 tab 3 times per day, as per Dr. Aranda.    I then called patient to let him know that medication has been sent to pharmacy and left the names and strength and how to take the medication.  Had to leave voicemail message and  requested that he call back if he has any questions.    Updated the medication list also.

## 2025-02-13 ENCOUNTER — OFFICE VISIT (OUTPATIENT)
Dept: PHYSICAL MEDICINE AND REHAB | Age: 55
End: 2025-02-13
Payer: COMMERCIAL

## 2025-02-13 VITALS
HEART RATE: 86 BPM | SYSTOLIC BLOOD PRESSURE: 138 MMHG | RESPIRATION RATE: 17 BRPM | DIASTOLIC BLOOD PRESSURE: 84 MMHG | BODY MASS INDEX: 27.72 KG/M2 | HEIGHT: 74 IN | WEIGHT: 216 LBS

## 2025-02-13 DIAGNOSIS — R25.2 SPASTICITY: ICD-10-CM

## 2025-02-13 DIAGNOSIS — S14.105D C5-C7 LEVEL SPINAL CORD INJURY, SUBSEQUENT ENCOUNTER (HCC): Primary | ICD-10-CM

## 2025-02-13 DIAGNOSIS — S14.129D CENTRAL CORD SYNDROME, SUBSEQUENT ENCOUNTER (HCC): ICD-10-CM

## 2025-02-13 PROCEDURE — 4004F PT TOBACCO SCREEN RCVD TLK: CPT | Performed by: PHYSICAL MEDICINE & REHABILITATION

## 2025-02-13 PROCEDURE — G8419 CALC BMI OUT NRM PARAM NOF/U: HCPCS | Performed by: PHYSICAL MEDICINE & REHABILITATION

## 2025-02-13 PROCEDURE — 99213 OFFICE O/P EST LOW 20 MIN: CPT | Performed by: PHYSICAL MEDICINE & REHABILITATION

## 2025-02-13 PROCEDURE — 3017F COLORECTAL CA SCREEN DOC REV: CPT | Performed by: PHYSICAL MEDICINE & REHABILITATION

## 2025-02-13 PROCEDURE — G8427 DOCREV CUR MEDS BY ELIG CLIN: HCPCS | Performed by: PHYSICAL MEDICINE & REHABILITATION

## 2025-02-13 NOTE — PROGRESS NOTES
[facility] PHYSICAL MEDICINE & REHABILITATION  [address] [address]  Dept: [phone]  Dept Fax: [phone]    Power Mobility Device Evaluation    Lauro Munroe  1970 54 y.o.  2/18/2025    HPI:     History of Present Illness  The patient presents for evaluation of C5-C7 central cord injury.    He sustained a C5-C7 central cord injury on 09/24/2024. He is currently utilizing a loaner power chair, which he reports as being able to recline and extend the legs. He resides in a two-story home with the bathroom located on the second floor. A bedside commode is available on the first floor for convenience. He navigates the stairs cautiously, often with the assistance of his sister, using a cane or relying on others for support. He finds ascending the stairs more challenging than descending. Despite persistent weakness in his arms and legs, he is able to ambulate short distances within his home, aided by a cane. He has experienced 8 falls since September, all resulting in no injuries. He is currently researching single-floor living options and has applied for disability. He is under the care of Dr. Mckeon, who performed his surgery. He has not yet consulted with a . He is able to transfer independently and uses a cane for stability during short distance ambulation.     He has been prescribed Neurontin, to be taken 3 times daily, but was unable to obtain it for approximately 1.5 weeks due to insurance issues. He resumed the medication yesterday and reports an improvement in his condition. He also takes baclofen as a muscle relaxant.           Reason for visit  Mobility Examination  Gait Abnormality     Mr. Lauro Munroe presents with past medical history of C5-7 AIS C Central spinal cord injury from September 2024.  He is requesting evaluation for power wheelchair.  Patient currently uses a  loaned power wheelchair  to navigate in

## 2025-02-20 ENCOUNTER — TELEPHONE (OUTPATIENT)
Dept: GASTROENTEROLOGY | Age: 55
End: 2025-02-20

## 2025-02-20 NOTE — TELEPHONE ENCOUNTER
Received message from [place] with [facility] pharmacy. A call back is needed regarding drug interaction with omeprazole and prescribed Hep C medication. Minimum dose requested for omeprazole is 20 mg taken at least 4 hours apart. A call back to pharmacist is requested. Contact [phone].

## 2025-03-19 RX ORDER — OMEPRAZOLE 40 MG/1
40 CAPSULE, DELAYED RELEASE ORAL DAILY
Qty: 30 CAPSULE | Refills: 5 | Status: SHIPPED | OUTPATIENT
Start: 2025-03-19

## 2025-03-19 NOTE — TELEPHONE ENCOUNTER
Lauro left voicemail message that he is out of gabapentin 300 mg three capsules TID.  Last prescribed on 12/26/24 with 2 refills.     Last visit 2/13/25  Next visit 4/16/25    Thank you.

## 2025-03-19 NOTE — TELEPHONE ENCOUNTER
Request for 1 medication.      Next Visit Date:  Future Appointments   Date Time Provider Department Center   3/27/2025 10:00 AM Linda Mckeon DO [facility]   4/16/2025  4:00 PM Diana Aranda MD [facility]   5/5/2025  2:30 PM Komal Santillan MD [facility]       Health Maintenance   Topic Date Due    Hepatitis B vaccine (1 of 3 - 19+ 3-dose series) Never done    Pneumococcal 50+ years Vaccine (1 of 2 - PCV) Never done    Diabetes screen  Never done    Lipids  Never done    Colorectal Cancer Screen  Never done    Shingles vaccine (1 of 2) Never done    Flu vaccine (1) Never done    COVID-19 Vaccine (2 - 2024-25 season) 09/01/2024    Depression Screen  11/26/2025    DTaP/Tdap/Td vaccine (3 - Td or Tdap) 09/24/2034    HIV screen  Completed    Hepatitis A vaccine  Aged Out    Hib vaccine  Aged Out    Polio vaccine  Aged Out    Meningococcal (ACWY) vaccine  Aged Out    Meningococcal B vaccine  Aged Out    Hepatitis C screen  Discontinued       No results found for: \"LABA1C\"          ( goal A1C is < 7)   No components found for: \"LABMICR\"  No components found for: \"LDLCHOLESTEROL\", \"LDLCALC\"    (goal LDL is <100)   AST (U/L)   Date Value   01/22/2025 46     ALT (U/L)   Date Value   01/22/2025 60 (H)     BUN (mg/dL)   Date Value   01/22/2025 13     BP Readings from Last 3 Encounters:   02/13/25 138/84   01/31/25 (!) 142/93   12/26/24 (!) 137/99          (goal 120/80)    All Future Testing planned in [facility]  Lab Frequency Next Occurrence   CT CERVICAL SPINE WO CONTRAST Once 03/12/2025   XR CERVICAL SPINE (4-5 VIEWS) Once 02/12/2025   US LIVER Once 12/19/2024   Hepatic Function Panel Once 01/31/2025         Patient Active Problem List:     Noninfective gastroenteritis and colitis, unspecified     Arthralgia of right ankle     Asymptomatic spider vein     Hepatitis C virus infection     Peptic ulcer     Nicotine dependence     Gastroesophageal reflux disease     Central cord syndrome, subsequent

## 2025-03-20 RX ORDER — GABAPENTIN 300 MG/1
900 CAPSULE ORAL 3 TIMES DAILY
Qty: 270 CAPSULE | Refills: 2 | Status: SHIPPED | OUTPATIENT
Start: 2025-03-20 | End: 2025-06-18

## 2025-03-25 ENCOUNTER — TELEPHONE (OUTPATIENT)
Dept: NEUROSURGERY | Age: 55
End: 2025-03-25

## 2025-03-25 NOTE — TELEPHONE ENCOUNTER
Attempted contacting patient to confirm appointment scheduled with Dr. Mckeon on 03/27/25 & find out if he's completed XR & CT ordered by Dr. Mckeon. No answer and voicemail not available.

## 2025-04-07 RX ORDER — BACLOFEN 10 MG/1
10 TABLET ORAL 3 TIMES DAILY
Qty: 90 TABLET | Refills: 2 | Status: SHIPPED | OUTPATIENT
Start: 2025-04-07

## 2025-05-06 ENCOUNTER — TELEPHONE (OUTPATIENT)
Dept: PHYSICAL MEDICINE AND REHAB | Age: 55
End: 2025-05-06

## 2025-05-06 DIAGNOSIS — S14.105D C5-C7 LEVEL SPINAL CORD INJURY, SUBSEQUENT ENCOUNTER (HCC): Primary | ICD-10-CM

## 2025-05-06 DIAGNOSIS — S14.129D CENTRAL CORD SYNDROME, SUBSEQUENT ENCOUNTER (HCC): ICD-10-CM

## 2025-05-06 DIAGNOSIS — R25.2 SPASTICITY: ICD-10-CM

## 2025-05-06 NOTE — TELEPHONE ENCOUNTER
Lauro called in requesting a new order to go to [facility]. Order is started for you.   Last visit 2/13/25  Next visit 6/17/25

## 2025-05-07 ENCOUNTER — TELEPHONE (OUTPATIENT)
Dept: PHYSICAL MEDICINE AND REHAB | Age: 55
End: 2025-05-07

## 2025-05-07 NOTE — TELEPHONE ENCOUNTER
Called patient to let him know physical therapy orders are placed. He wanted me to ask you if the medications you prescribe could make him lose his appetite. He said he's not eating very often and he wondered if it could be from the medications. I asked him if he tried adding protein shakes to his diet and he said he has not. Please advise.

## 2025-05-07 NOTE — TELEPHONE ENCOUNTER
I faxed [facility] asking if he could be rescheduled with them or if he was permanently discharged from their department. They responded with they will re-schedule patient if they have new orders. Patient is eager to restart therapies.

## 2025-05-09 ENCOUNTER — TELEPHONE (OUTPATIENT)
Dept: PHYSICAL MEDICINE AND REHAB | Age: 55
End: 2025-05-09

## 2025-05-09 DIAGNOSIS — S14.129D CENTRAL CORD SYNDROME, SUBSEQUENT ENCOUNTER (HCC): ICD-10-CM

## 2025-05-09 DIAGNOSIS — S14.105D C5-C7 LEVEL SPINAL CORD INJURY, SUBSEQUENT ENCOUNTER (HCC): Primary | ICD-10-CM

## 2025-05-09 DIAGNOSIS — R25.2 SPASTICITY: ICD-10-CM

## 2025-05-09 NOTE — TELEPHONE ENCOUNTER
Check in the system we can do that on the ambulatory care services will provide any help if that refer him to those gucarmelina

## 2025-05-09 NOTE — TELEPHONE ENCOUNTER
Lauro called and was requesting  referral.   When patient was last seen by Dr. Aranda on 2/13/2025 she noted placing a referral for a .   --------------------------------------  \"A referral to a  will be made to assist with housing and disability-related issues. \"  --------------------------------------------------------------------------      We do  not have access to care coordinators in our office. There is a  through neurology office but that person is out on leave until end of June.   Suggesting that the PCP office- Dr. Montana's office- may have  or care coordinators in house.   I was unable to leave a message at PCP office to inquire.     Dr. Montana-  Is this a service available through your office?  If so, can someone reach out to him about his needs?  Thank you-

## 2025-05-14 ENCOUNTER — CARE COORDINATION (OUTPATIENT)
Dept: CARE COORDINATION | Age: 55
End: 2025-05-14

## 2025-05-14 NOTE — CARE COORDINATION
Writer received the Referral from OUSMANE CHAVIRA Nurse with [facility] in regards to Patient needing assistance with Housing and Disability.    Writer called to introduce myself and Patient reported that he has already filed for Disability and was nearing the completion of the process.    Writer inquired if he had a verifiable source of income as he would need that in order to apply for Housing he reported that he didn't.    Writer will check a few options and get back to Patient in a week or so.

## 2025-05-22 ENCOUNTER — CARE COORDINATION (OUTPATIENT)
Dept: CARE COORDINATION | Age: 55
End: 2025-05-22

## 2025-05-22 NOTE — CARE COORDINATION
called Patient to do a follow up Social service call with him in regards to Housing; Patient did not answer his phone.    Cornelior left a voicemail asking for a return call from him; Will follow back up with Patient in a week or so.     also mailed out an application for ([facility] Apartment's and a [address] Housing list as well).

## 2025-06-02 ENCOUNTER — CARE COORDINATION (OUTPATIENT)
Dept: CARE COORDINATION | Age: 55
End: 2025-06-02

## 2025-06-02 NOTE — CARE COORDINATION
received call from Patient and he reported that he had not received the Apartment applications that  mailed out to him.     mailed out the applications to Patient again on today 06/02/2025 from [address]. Will follow back up with Patient in a week or so.

## 2025-06-17 ENCOUNTER — OFFICE VISIT (OUTPATIENT)
Dept: PHYSICAL MEDICINE AND REHAB | Age: 55
End: 2025-06-17
Payer: COMMERCIAL

## 2025-06-17 VITALS
WEIGHT: 210 LBS | HEART RATE: 84 BPM | SYSTOLIC BLOOD PRESSURE: 126 MMHG | HEIGHT: 74 IN | DIASTOLIC BLOOD PRESSURE: 86 MMHG | BODY MASS INDEX: 26.95 KG/M2

## 2025-06-17 DIAGNOSIS — S14.105D C5-C7 LEVEL SPINAL CORD INJURY, SUBSEQUENT ENCOUNTER (HCC): Primary | ICD-10-CM

## 2025-06-17 DIAGNOSIS — S14.129D CENTRAL CORD SYNDROME, SUBSEQUENT ENCOUNTER (HCC): ICD-10-CM

## 2025-06-17 DIAGNOSIS — R25.2 SPASTICITY: ICD-10-CM

## 2025-06-17 PROCEDURE — 99214 OFFICE O/P EST MOD 30 MIN: CPT | Performed by: PHYSICAL MEDICINE & REHABILITATION

## 2025-06-17 PROCEDURE — G8419 CALC BMI OUT NRM PARAM NOF/U: HCPCS | Performed by: PHYSICAL MEDICINE & REHABILITATION

## 2025-06-17 PROCEDURE — G8427 DOCREV CUR MEDS BY ELIG CLIN: HCPCS | Performed by: PHYSICAL MEDICINE & REHABILITATION

## 2025-06-17 PROCEDURE — 3017F COLORECTAL CA SCREEN DOC REV: CPT | Performed by: PHYSICAL MEDICINE & REHABILITATION

## 2025-06-17 PROCEDURE — 4004F PT TOBACCO SCREEN RCVD TLK: CPT | Performed by: PHYSICAL MEDICINE & REHABILITATION

## 2025-06-17 RX ORDER — AMITRIPTYLINE HYDROCHLORIDE 50 MG/1
50 TABLET ORAL NIGHTLY
Qty: 30 TABLET | Refills: 2 | Status: SHIPPED | OUTPATIENT
Start: 2025-06-17

## 2025-06-17 NOTE — PROGRESS NOTES
[facility] PHYSICAL MEDICINE & REHABILITATION  [address] [address]  Dept: [phone]  Dept Fax: [phone]    Outpatient Followup Note    Lauro Munroe, 54 y.o., male, presents for follow up c/o of No chief complaint on file.  .     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    He has been utilizing a power chair, which he reports as beneficial. He continues to engage in therapy at [facility], demonstrating improved mobility and independence in daily activities such as dressing. He is able to ambulate short distances within his home, occasionally with the aid of a cane. He has experienced one fall recently (2 months ago), but was able to regain his footing independently. He retains control and sensation of his bowels and bladder, and reports no skin issues. He has attempted to contact the spinal injury group twice without success. He has observed an improvement in his left hand strength, now able to form a fist, although it lacks strength.    He experiences occasional depressed moods, which he attributes to his current situation. He is on amitriptyline.    He reports a burning sensation in his forearms and hands, and decreased sensation in his legs. His sleep is disrupted due to discomfort, leading to daytime drowsiness. He is currently on gabapentin 900 mg 3 times daily, which he tolerates well without excessive sleepiness. He is also on amitriptyline at bedtime, which has provided some relief from his neurologic pain, but not completely.    He experiences muscle spasms primarily in the morning upon waking, and occasionally in his hand while watching TV. These spasms are less frequent and severe than before. He finds relief from rubbing his hand or leg during these episodes. He believes the current dose of muscle relaxer is effective.           Past Medical History:

## 2025-06-19 ENCOUNTER — CARE COORDINATION (OUTPATIENT)
Dept: CARE COORDINATION | Age: 55
End: 2025-06-19

## 2025-06-19 NOTE — CARE COORDINATION
Patient called writer to report that he had submitted the application to [employer] and what were next steps.    Writer will follow back up with Patient in a week or so after speaking to their  on Patient's behalf.

## 2025-06-24 ENCOUNTER — TELEPHONE (OUTPATIENT)
Dept: PHYSICAL MEDICINE AND REHAB | Age: 55
End: 2025-06-24

## 2025-06-24 DIAGNOSIS — R25.2 SPASTICITY: ICD-10-CM

## 2025-06-24 DIAGNOSIS — S14.105D C5-C7 LEVEL SPINAL CORD INJURY, SUBSEQUENT ENCOUNTER (HCC): Primary | ICD-10-CM

## 2025-06-24 DIAGNOSIS — S14.129D CENTRAL CORD SYNDROME, SUBSEQUENT ENCOUNTER (HCC): ICD-10-CM

## 2025-06-24 NOTE — TELEPHONE ENCOUNTER
Patient was given an order for PT on 5/7/25. He is also asking for an order for OT to be faxed to [facility].

## 2025-07-03 ENCOUNTER — CARE COORDINATION (OUTPATIENT)
Dept: CARE COORDINATION | Age: 55
End: 2025-07-03

## 2025-07-03 NOTE — CARE COORDINATION
Writer called over to [employer] and [facility]'s on Patient's behalf to see where they were in his application process.    Neither answered their phone; Writer left voicemail's for both and writer called Patient and his voicemail box is not set up.    Writer will follow back up in a week or so with Patient.

## 2025-07-10 ENCOUNTER — TELEPHONE (OUTPATIENT)
Dept: NEUROSURGERY | Age: 55
End: 2025-07-10

## 2025-07-10 NOTE — TELEPHONE ENCOUNTER
Attempted contacting patient to confirm appointment scheduled with Dr. Mckeon on 7/15/2025 & find out if he's completed XR & CT ordered by Dr. Mckeon. No answer and voicemail not available.       CT Cervical and XR Cervical needs to be completed

## 2025-07-17 ENCOUNTER — CARE COORDINATION (OUTPATIENT)
Dept: CARE COORDINATION | Age: 55
End: 2025-07-17

## 2025-07-17 NOTE — CARE COORDINATION
Writer called Patient to do a follow up Social service call with him to gauge his needs.    No one answered the phone; Cornelior left a voicemail asking for a return call. Will follow back up in a week or so.

## 2025-07-30 ENCOUNTER — CARE COORDINATION (OUTPATIENT)
Dept: CARE COORDINATION | Age: 55
End: 2025-07-30

## 2025-08-15 ENCOUNTER — CARE COORDINATION (OUTPATIENT)
Dept: CARE COORDINATION | Age: 55
End: 2025-08-15

## 2025-08-26 RX ORDER — GABAPENTIN 300 MG/1
CAPSULE ORAL
Qty: 270 CAPSULE | Refills: 2 | Status: SHIPPED | OUTPATIENT
Start: 2025-08-26 | End: 2025-11-24

## 2025-08-26 RX ORDER — BACLOFEN 10 MG/1
10 TABLET ORAL 3 TIMES DAILY
Qty: 90 TABLET | Refills: 2 | Status: SHIPPED | OUTPATIENT
Start: 2025-08-26

## 2025-09-02 ENCOUNTER — CARE COORDINATION (OUTPATIENT)
Dept: CARE COORDINATION | Age: 55
End: 2025-09-02

## (undated) DEVICE — DRESSING TRNSPAR W2XL2.75IN FLM SHT SEMIPERMEABLE WIND

## (undated) DEVICE — STRAP,POSITIONING,KNEE/BODY,FOAM,4X60": Brand: MEDLINE

## (undated) DEVICE — BLADE ES ELASTOMERIC COAT INSUL DURABLE BEND UPTO 90DEG

## (undated) DEVICE — SUTURE STRATAFIX SYMMETRIC PDS + SZ 0 L18IN ABSRB L36MM SXPP1A401

## (undated) DEVICE — DRILL BIT G3606010 2.4MM

## (undated) DEVICE — SUTURE MONOCRYL SZ 3-0 L27IN ABSRB UD L24MM PS-1 3/8 CIR PRIM Y936H

## (undated) DEVICE — GARMENT,MEDLINE,DVT,INT,CALF,MED, GEN2: Brand: MEDLINE

## (undated) DEVICE — SPONGE,NEURO,0.5"X3",XR,STRL,LF,10/PK: Brand: MEDLINE

## (undated) DEVICE — BIPOLAR SEALER 23-113-1 AQM 2.3 OM NEURO: Brand: AQUAMANTYS ®

## (undated) DEVICE — 3.0MM PRECISION NEURO (MATCH HEAD)

## (undated) DEVICE — SUTURE VICRYL + SZ 3-0 L27IN ABSRB UD L26MM SH 1/2 CIR VCP416H

## (undated) DEVICE — LIQUIBAND RAPID ADHESIVE 36/CS 0.8ML: Brand: MEDLINE

## (undated) DEVICE — Device

## (undated) DEVICE — C-ARMOR C-ARM EQUIPMENT COVERS CLEAR STERILE UNIVERSAL FIT 12 PER CASE: Brand: C-ARMOR

## (undated) DEVICE — RESERVOIR,SUCTION,100CC,SILICONE: Brand: MEDLINE

## (undated) DEVICE — APPLICATOR MEDICATED 10.5 CC SOLUTION HI LT ORNG CHLORAPREP

## (undated) DEVICE — SUTURE VICRYL SZ 2-0 L18IN ABSRB UD CT-1 L36MM 1/2 CIR J839D

## (undated) DEVICE — SUTURE NONABSORBABLE MONOFILAMENT 2-0 FS 18 IN ETHILON 664H

## (undated) DEVICE — SUTURE VICRYL SZ 3-0 L18IN ABSRB UD L26MM SH 1/2 CIR J864D

## (undated) DEVICE — NEEDLE, QUINCKE, 18GX3.5": Brand: MEDLINE

## (undated) DEVICE — AGENT HEMOSTATIC SURGIFLOW MATRIX KIT W/THROMBIN

## (undated) DEVICE — SPONGE,NEURO,0.5"X0.5",XR,STRL,10/PK: Brand: MEDLINE

## (undated) DEVICE — DRESSING FOAM W4XL10IN AG SIL ADH ANTIMIC POSTOP OPTIFOAM

## (undated) DEVICE — 1LYRTR 16FR10ML100%SIL UMS SNP: Brand: MEDLINE INDUSTRIES, INC.

## (undated) DEVICE — GAUZE,SPONGE,FLUFF,6"X6.75",STRL,5/TRAY: Brand: MEDLINE

## (undated) DEVICE — DRAIN SURG 10FR 100% SIL RND END PERF W/ TRCR

## (undated) DEVICE — PAD,NON-ADHERENT,3X8,STERILE,LF,1/PK: Brand: MEDLINE

## (undated) DEVICE — STVZ LUMBAR SPINE PACK: Brand: MEDLINE INDUSTRIES, INC.

## (undated) DEVICE — COVER,MAYO STAND,XL,STERILE: Brand: MEDLINE

## (undated) DEVICE — ELECTRODE PT RET AD L9FT HI MOIST COND ADH HYDRGEL CORDED

## (undated) DEVICE — SUTURE VICRYL + SZ 0 L18IN ABSRB UD L36MM CT-1 1/2 CIR VCP840D

## (undated) DEVICE — MARKER,SKIN,WI/RULER AND LABELS: Brand: MEDLINE

## (undated) DEVICE — LARGE BLUNT, DUAL PACK: Brand: LINA SKIN HOOK™

## (undated) DEVICE — GLOVE SURG SZ 7 L12IN THK7.5MIL DK GRN LTX FREE MSG6570] MEDLINE INDUSTRIES INC]

## (undated) DEVICE — C-ARM: Brand: UNBRANDED

## (undated) DEVICE — GOWN,AURORA,NONREINFORCED,LARGE: Brand: MEDLINE